# Patient Record
Sex: FEMALE | Race: WHITE | Employment: OTHER | URBAN - METROPOLITAN AREA
[De-identification: names, ages, dates, MRNs, and addresses within clinical notes are randomized per-mention and may not be internally consistent; named-entity substitution may affect disease eponyms.]

---

## 2017-02-01 ENCOUNTER — TELEPHONE (OUTPATIENT)
Dept: INTERNAL MEDICINE | Facility: CLINIC | Age: 77
End: 2017-02-01

## 2017-02-03 DIAGNOSIS — E03.9 HYPOTHYROIDISM, UNSPECIFIED TYPE: ICD-10-CM

## 2017-02-03 DIAGNOSIS — N18.30 CKD (CHRONIC KIDNEY DISEASE) STAGE 3, GFR 30-59 ML/MIN (H): ICD-10-CM

## 2017-02-03 DIAGNOSIS — N18.30 TYPE 2 DIABETES MELLITUS WITH STAGE 3 CHRONIC KIDNEY DISEASE (H): ICD-10-CM

## 2017-02-03 DIAGNOSIS — E11.22 TYPE 2 DIABETES MELLITUS WITH STAGE 3 CHRONIC KIDNEY DISEASE (H): ICD-10-CM

## 2017-02-03 LAB
ANION GAP SERPL CALCULATED.3IONS-SCNC: 7 MMOL/L (ref 3–14)
BUN SERPL-MCNC: 21 MG/DL (ref 7–30)
CALCIUM SERPL-MCNC: 8.9 MG/DL (ref 8.5–10.1)
CHLORIDE SERPL-SCNC: 106 MMOL/L (ref 94–109)
CHOLEST SERPL-MCNC: 158 MG/DL
CO2 SERPL-SCNC: 28 MMOL/L (ref 20–32)
CREAT SERPL-MCNC: 1.32 MG/DL (ref 0.52–1.04)
CREAT UR-MCNC: 161 MG/DL
DEPRECATED CALCIDIOL+CALCIFEROL SERPL-MC: 18 UG/L (ref 20–75)
ERYTHROCYTE [DISTWIDTH] IN BLOOD BY AUTOMATED COUNT: 14.1 % (ref 10–15)
GFR SERPL CREATININE-BSD FRML MDRD: 39 ML/MIN/1.7M2
GLUCOSE SERPL-MCNC: 113 MG/DL (ref 70–99)
HBA1C MFR BLD: 6.1 % (ref 4.3–6)
HCT VFR BLD AUTO: 34.4 % (ref 35–47)
HDLC SERPL-MCNC: 50 MG/DL
HGB BLD-MCNC: 10.9 G/DL (ref 11.7–15.7)
LDLC SERPL CALC-MCNC: 85 MG/DL
MCH RBC QN AUTO: 32.9 PG (ref 26.5–33)
MCHC RBC AUTO-ENTMCNC: 31.7 G/DL (ref 31.5–36.5)
MCV RBC AUTO: 104 FL (ref 78–100)
MICROALBUMIN UR-MCNC: 20 MG/L
MICROALBUMIN/CREAT UR: 12.73 MG/G CR (ref 0–25)
NONHDLC SERPL-MCNC: 108 MG/DL
PLATELET # BLD AUTO: 156 10E9/L (ref 150–450)
POTASSIUM SERPL-SCNC: 4.2 MMOL/L (ref 3.4–5.3)
RBC # BLD AUTO: 3.31 10E12/L (ref 3.8–5.2)
SODIUM SERPL-SCNC: 141 MMOL/L (ref 133–144)
T4 FREE SERPL-MCNC: 1 NG/DL (ref 0.76–1.46)
TRIGL SERPL-MCNC: 117 MG/DL
TSH SERPL DL<=0.005 MIU/L-ACNC: 9.5 MU/L (ref 0.4–4)
WBC # BLD AUTO: 8.6 10E9/L (ref 4–11)

## 2017-02-03 PROCEDURE — 80048 BASIC METABOLIC PNL TOTAL CA: CPT | Performed by: INTERNAL MEDICINE

## 2017-02-03 PROCEDURE — 84443 ASSAY THYROID STIM HORMONE: CPT | Performed by: INTERNAL MEDICINE

## 2017-02-03 PROCEDURE — 36415 COLL VENOUS BLD VENIPUNCTURE: CPT | Performed by: INTERNAL MEDICINE

## 2017-02-03 PROCEDURE — 82043 UR ALBUMIN QUANTITATIVE: CPT | Performed by: INTERNAL MEDICINE

## 2017-02-03 PROCEDURE — 83036 HEMOGLOBIN GLYCOSYLATED A1C: CPT | Performed by: INTERNAL MEDICINE

## 2017-02-03 PROCEDURE — 84439 ASSAY OF FREE THYROXINE: CPT | Performed by: INTERNAL MEDICINE

## 2017-02-03 PROCEDURE — 82306 VITAMIN D 25 HYDROXY: CPT | Performed by: INTERNAL MEDICINE

## 2017-02-03 PROCEDURE — 85027 COMPLETE CBC AUTOMATED: CPT | Performed by: INTERNAL MEDICINE

## 2017-02-03 PROCEDURE — 80061 LIPID PANEL: CPT | Performed by: INTERNAL MEDICINE

## 2017-02-22 ENCOUNTER — OFFICE VISIT (OUTPATIENT)
Dept: INTERNAL MEDICINE | Facility: CLINIC | Age: 77
End: 2017-02-22
Payer: COMMERCIAL

## 2017-02-22 ENCOUNTER — RADIANT APPOINTMENT (OUTPATIENT)
Dept: GENERAL RADIOLOGY | Facility: CLINIC | Age: 77
End: 2017-02-22
Attending: PHYSICIAN ASSISTANT
Payer: COMMERCIAL

## 2017-02-22 VITALS
SYSTOLIC BLOOD PRESSURE: 140 MMHG | HEART RATE: 81 BPM | BODY MASS INDEX: 40.85 KG/M2 | TEMPERATURE: 98.5 F | DIASTOLIC BLOOD PRESSURE: 70 MMHG | WEIGHT: 238 LBS | OXYGEN SATURATION: 96 %

## 2017-02-22 DIAGNOSIS — S89.92XA LEFT KNEE INJURY, INITIAL ENCOUNTER: ICD-10-CM

## 2017-02-22 DIAGNOSIS — N76.0 LABIAL INFECTION: Primary | ICD-10-CM

## 2017-02-22 PROCEDURE — 99214 OFFICE O/P EST MOD 30 MIN: CPT | Performed by: PHYSICIAN ASSISTANT

## 2017-02-22 PROCEDURE — 73562 X-RAY EXAM OF KNEE 3: CPT | Mod: LT

## 2017-02-22 RX ORDER — CEPHALEXIN 500 MG/1
500 CAPSULE ORAL 3 TIMES DAILY
Qty: 30 CAPSULE | Refills: 0 | Status: SHIPPED | OUTPATIENT
Start: 2017-02-22 | End: 2017-03-04

## 2017-02-22 NOTE — NURSING NOTE
"Chief Complaint   Patient presents with     Vaginal Problem     external lump on vagina-painful x cpl months      Musculoskeletal Problem     R knee pain        Initial /70 (BP Location: Left arm, Patient Position: Chair, Cuff Size: Adult Regular)  Pulse 81  Temp 98.5  F (36.9  C) (Oral)  Wt 238 lb (108 kg)  LMP 06/16/1985 (Approximate)  SpO2 96%  BMI 40.85 kg/m2 Estimated body mass index is 40.85 kg/(m^2) as calculated from the following:    Height as of 4/18/16: 5' 4\" (1.626 m).    Weight as of this encounter: 238 lb (108 kg).  Medication Reconciliation: complete    "

## 2017-02-22 NOTE — PROGRESS NOTES
SUBJECTIVE:                                                    Mahogany Blanco is a 76 year old female who presents to clinic today for the following health issues:      Pt states she has had a bump on external vaginal area for a couple months, didn't bother her at first and now it is painful. Pt also fell on knee x4 days and would like it looked at.            Vaginal Symptoms      Duration: 2-3 months.     Description  One bump, felt.  Last night with pain    Intensity:  moderate    Accompanying signs and symptoms (fever/dysuria/abdominal or back pain): no drainage from the area     History  Sexually active: not at present  Possibility of pregnancy: No  Recent antibiotic use: no     Precipitating or alleviating factors: None    Therapies tried and outcome: none   Outcome:    Musculoskeletal problem/pain      Duration: 2/18/2017     Description  Location: left knee     Intensity:  moderate    Accompanying signs and symptoms: swelling and bruising     History  Previous similar problem: no   Previous evaluation:  none    Precipitating or alleviating factors:  Trauma or overuse: YES- fall   Aggravating factors include: standing and walking  Therapies tried and outcome: nothing  Tylenol, icing the day that she fell       Problem list and histories reviewed & adjusted, as indicated.  Additional history: as documented    Labs reviewed in EPIC  Problem list, Medication list, Allergies, and Medical/Social/Surgical histories reviewed in [facility] and updated as appropriate.    ROS:  Constitutional, HEENT, cardiovascular, pulmonary, gi and gu systems are negative, except as otherwise noted.    OBJECTIVE:                                                    /70 (BP Location: Left arm, Patient Position: Chair, Cuff Size: Adult Regular)  Pulse 81  Temp 98.5  F (36.9  C) (Oral)  Wt 238 lb (108 kg)  LMP 06/16/1985 (Approximate)  SpO2 96%  BMI 40.85 kg/m2  Body mass index is 40.85 kg/(m^2).  GENERAL: healthy, alert and no  distress  NECK: no adenopathy, no asymmetry, masses, or scars and thyroid normal to palpation  RESP: lungs clear to auscultation - no rales, rhonchi or wheezes  CV: regular rate and rhythm, normal S1 S2, no S3 or S4, no murmur, click or rub, no peripheral edema and peripheral pulses strong   (female): normal female external genitalia, normal urethral meatus  and vaginal skin findings: right labia with redness swelling slight induration noted.   MS: left knee with large echymosis anterior knee, abrasion just below patella  Tenderness anterior knee.   No calf pain.      Diagnostic Test Results:  Xray- no fx      ASSESSMENT/PLAN:                                                            1. Labial infection    - cephALEXin (KEFLEX) 500 MG capsule; Take 1 capsule (500 mg) by mouth 3 times daily for 10 days  Dispense: 30 capsule; Refill: 0    2. Left knee injury, initial encounter    - XR Knee Left 3 Views; Future    Warm compress/warm shower to the  area  Recheck if not improving with abx xcourse    Sheri Elmore PA-C  [facility]

## 2017-02-22 NOTE — MR AVS SNAPSHOT
"              After Visit Summary   2017    Mahogany Blanco    MRN: [id number]           Patient Information     Date Of Birth          1940        Visit Information        Provider Department      2017 4:20 PM Sheri Elmore PA-C [facility]        Today's Diagnoses     Labial infection    -  1    Left knee injury, initial encounter           Follow-ups after your visit        Who to contact     If you have questions or need follow up information about today's clinic visit or your schedule please contact [facility] directly at [phone].  Normal or non-critical lab and imaging results will be communicated to you by 3seventyhart, letter or phone within 4 business days after the clinic has received the results. If you do not hear from us within 7 days, please contact the clinic through 3seventyhart or phone. If you have a critical or abnormal lab result, we will notify you by phone as soon as possible.  Submit refill requests through "[employer] or call your pharmacy and they will forward the refill request to us. Please allow 3 business days for your refill to be completed.          Additional Information About Your Visit        MyChart Information     "[employer] lets you send messages to your doctor, view your test results, renew your prescriptions, schedule appointments and more. To sign up, go to [email or web address] . Click on \"Log in\" on the left side of the screen, which will take you to the Welcome page. Then click on \"Sign up Now\" on the right side of the page.     You will be asked to enter the access code listed below, as well as some personal information. Please follow the directions to create your username and password.     Your access code is: [email or web address]  Expires: 3/22/2017 11:08 AM     Your access code will  in 90 days. If you need help or a new code, please call your [facility] or [phone].        Care EveryWhere ID     " This is your Care EveryWhere ID. This could be used by other organizations to access your [facility] medical records  [id number]        Your Vitals Were     Pulse Temperature Last Period Pulse Oximetry BMI (Body Mass Index)       81 98.5  F (36.9  C) (Oral) 06/16/1985 (Approximate) 96% 40.85 kg/m2        Blood Pressure from Last 3 Encounters:   02/22/17 140/70   12/22/16 110/70   05/27/16 148/86    Weight from Last 3 Encounters:   02/22/17 238 lb (108 kg)   12/22/16 235 lb (106.6 kg)   05/27/16 243 lb (110.2 kg)                 Today's Medication Changes          These changes are accurate as of: 2/22/17  4:57 PM.  If you have any questions, ask your nurse or doctor.               Start taking these medicines.        Dose/Directions    cephALEXin 500 MG capsule   Commonly known as:  KEFLEX   Used for:  Labial infection   Started by:  Sheri Elmore PA-C        Dose:  500 mg   Take 1 capsule (500 mg) by mouth 3 times daily for 10 days   Quantity:  30 capsule   Refills:  0            Where to get your medicines      These medications were sent to [facility] [address] [place] [address] [address] AT [facility] [address] [place]  [address], [facility] [address]     Phone:  [phone]     cephALEXin 500 MG capsule                Primary Care Provider Office Phone # Fax #    Aquilino Lilly [phone]       [facility] [address]  [facility] [address]        Thank you!     Thank you for choosing [facility]  for your care. Our goal is always to provide you with excellent care. Hearing back from our patients is one way we can continue to improve our services. Please take a few minutes to complete the written survey that you may receive in the mail after your visit with us. Thank you!             Your Updated Medication List - Protect others around you: Learn how to safely use, store and throw away your medicines at  www.disposemymeds.org.          This list is accurate as of: 2/22/17  4:57 PM.  Always use your most recent med list.                   Brand Name Dispense Instructions for use    albuterol 108 (90 BASE) MCG/ACT Inhaler    albuterol    1 Inhaler    Inhale 2 puffs into the lungs every 4 hours as needed for shortness of breath / dyspnea or wheezing       B-12 1000 MCG Tbcr     90 tablet    Take 1,000 mcg by mouth daily       blood glucose monitoring lancets      Use to test blood sugar once daily or as directed.       blood glucose monitoring test strip    ACCU-CHEK NOELLE    50 each    Use to test blood sugars 1 time daily or as directed.       buPROPion 150 MG 24 hr tablet    WELLBUTRIN XL    90 tablet    Take 1 tablet (150 mg) by mouth every morning       cephALEXin 500 MG capsule    KEFLEX    30 capsule    Take 1 capsule (500 mg) by mouth 3 times daily for 10 days       citalopram 20 MG tablet    celeXA    90 tablet    Take 1 tablet (20 mg) by mouth daily       colchicine 0.6 MG tablet    COLCRYS    30 tablet    Take 2 tablets at the first sign of flare, take 1 additional tablet one hour later.       fluticasone 50 MCG/ACT spray    FLONASE    16 g    Spray 1-2 sprays into both nostrils daily       levothyroxine 75 MCG tablet    SYNTHROID/LEVOTHROID    90 tablet    Take 1 tablet (75 mcg) by mouth daily       lisinopril 40 MG tablet    PRINIVIL/ZESTRIL    90 tablet    Take 1 tablet (40 mg) by mouth daily       omeprazole 20 MG CR capsule    priLOSEC    90 capsule    Take 1 capsule (20 mg) by mouth daily       order for DME     1 Device    Disp one glucometer that is covered by insurance with 1 RF. Also disp glucometer strips and lancets to check sugars daily. Disp 100 strips and  Lancets  with prn RFs for 1 year       order for DME     1 each    Equipment being ordered:   AeroChamber (or similar device for inhaler use)       pravastatin 40 MG tablet    PRAVACHOL    90 tablet    Take 1 tablet (40 mg) by mouth daily at  bedtime.       traMADol 50 MG tablet    ULTRAM    20 tablet    Take 1 tablet (50 mg) by mouth every 6 hours as needed for moderate pain       zolpidem 5 MG tablet    AMBIEN    30 tablet    Take 1 tablet (5 mg) by mouth nightly as needed for sleep

## 2017-03-13 DIAGNOSIS — E78.5 HYPERLIPIDEMIA LDL GOAL <100: ICD-10-CM

## 2017-03-13 RX ORDER — PRAVASTATIN SODIUM 40 MG
40 TABLET ORAL DAILY
Qty: 90 TABLET | Refills: 3 | Status: SHIPPED | OUTPATIENT
Start: 2017-03-13 | End: 2018-03-07

## 2017-03-13 NOTE — TELEPHONE ENCOUNTER
pravastatin (PRAVACHOL) 40 MG tablet     Last Written Prescription Date: 9/14/2016  Last Fill Quantity: 90, # refills: 0  Last Office Visit with G, [facility] or [facility] prescribing provider: 2/22/2017       Lab Results   Component Value Date    CHOL 158 02/03/2017     Lab Results   Component Value Date    HDL 50 02/03/2017     Lab Results   Component Value Date    LDL 85 02/03/2017     Lab Results   Component Value Date    TRIG 117 02/03/2017     Lab Results   Component Value Date    CHOLHDLRATIO 3.0 08/31/2015

## 2017-05-10 DIAGNOSIS — E03.9 HYPOTHYROIDISM, UNSPECIFIED TYPE: Primary | ICD-10-CM

## 2017-05-10 DIAGNOSIS — E03.9 HYPOTHYROIDISM, UNSPECIFIED TYPE: ICD-10-CM

## 2017-05-10 RX ORDER — LEVOTHYROXINE SODIUM 75 UG/1
TABLET ORAL
Qty: 30 TABLET | Refills: 0 | Status: SHIPPED | OUTPATIENT
Start: 2017-05-10 | End: 2017-05-10

## 2017-05-10 RX ORDER — LEVOTHYROXINE SODIUM 75 UG/1
TABLET ORAL
Qty: 90 TABLET | Refills: 0 | OUTPATIENT
Start: 2017-05-10

## 2017-05-10 RX ORDER — LEVOTHYROXINE SODIUM 75 UG/1
TABLET ORAL
Qty: 90 TABLET | Refills: 0 | Status: SHIPPED | OUTPATIENT
Start: 2017-05-10 | End: 2017-05-26 | Stop reason: DRUGHIGH

## 2017-05-10 NOTE — TELEPHONE ENCOUNTER
Routing refill request to provider for review/approval because:  Labs out of range:  TSH        Synthroid      Last Written Prescription Date: 5/4/16  Last Quantity: 90, # refills: 3  Last Office Visit with G, P or [facility] prescribing provider: 2/22/17        TSH   Date Value Ref Range Status   02/03/2017 9.50 (H) 0.40 - 4.00 mU/L Final

## 2017-05-10 NOTE — TELEPHONE ENCOUNTER
Pt was sent letter in February instructing her to  See me  Within a few weeks after letter sent to review abnormal labs to determine if low thyroid due to med noncompliance vs need for dose increase, etc. Pt then scheduled appt for late April and then cancelled it per chart. RF done for 30 days. Needs to be seen back in clinic in the next 30 days to discuss this and other lab abnormalities as per last lab letter since to pt in February. Call pt to assist with getting appt made for pt and informing her of above.  Will address additional refills of meds beyond 30 days after pt seen back in clinic

## 2017-05-10 NOTE — TELEPHONE ENCOUNTER
Duplicate  Filled today   Pharmacy advised not to fill #90 see previous refill encounter.  Only #30 approved, pt overdue for follow up appointment.

## 2017-05-12 DIAGNOSIS — I10 ESSENTIAL HYPERTENSION, BENIGN: ICD-10-CM

## 2017-05-12 RX ORDER — LISINOPRIL 40 MG/1
TABLET ORAL
Qty: 90 TABLET | Refills: 0 | Status: SHIPPED | OUTPATIENT
Start: 2017-05-12 | End: 2017-08-08

## 2017-05-12 NOTE — TELEPHONE ENCOUNTER
Prescription approved per [facility] Refill Protocol.  Upcoming appt

## 2017-05-12 NOTE — TELEPHONE ENCOUNTER
lisinopril (PRINIVIL,ZESTRIL) 40 MG tablet      Last Written Prescription Date: 5/17/2016  Last Fill Quantity: 90, # refills: 3  Last Office Visit with G, P or [facility] prescribing provider: 2/22/2017  Next 5 appointments (look out 90 days)     May 26, 2017 10:00 AM CDT   Office Visit with Aquilino Lilly MD   [facility] ([facility]    [address] [place] [address] [phone]                   Potassium   Date Value Ref Range Status   02/03/2017 4.2 3.4 - 5.3 mmol/L Final     Creatinine   Date Value Ref Range Status   02/03/2017 1.32 (H) 0.52 - 1.04 mg/dL Final     BP Readings from Last 3 Encounters:   02/22/17 140/70   12/22/16 110/70   05/27/16 148/86

## 2017-05-26 ENCOUNTER — OFFICE VISIT (OUTPATIENT)
Dept: INTERNAL MEDICINE | Facility: CLINIC | Age: 77
End: 2017-05-26
Payer: COMMERCIAL

## 2017-05-26 VITALS
RESPIRATION RATE: 18 BRPM | BODY MASS INDEX: 40.37 KG/M2 | OXYGEN SATURATION: 95 % | TEMPERATURE: 98.5 F | HEART RATE: 82 BPM | DIASTOLIC BLOOD PRESSURE: 70 MMHG | HEIGHT: 65 IN | SYSTOLIC BLOOD PRESSURE: 120 MMHG | WEIGHT: 242.3 LBS

## 2017-05-26 DIAGNOSIS — E11.22 TYPE 2 DIABETES MELLITUS WITH STAGE 3 CHRONIC KIDNEY DISEASE, WITHOUT LONG-TERM CURRENT USE OF INSULIN (H): ICD-10-CM

## 2017-05-26 DIAGNOSIS — E03.9 HYPOTHYROIDISM, UNSPECIFIED TYPE: Primary | ICD-10-CM

## 2017-05-26 DIAGNOSIS — F32.1 MAJOR DEPRESSIVE DISORDER, SINGLE EPISODE, MODERATE (H): ICD-10-CM

## 2017-05-26 DIAGNOSIS — N18.30 CKD (CHRONIC KIDNEY DISEASE) STAGE 3, GFR 30-59 ML/MIN (H): ICD-10-CM

## 2017-05-26 DIAGNOSIS — E66.01 MORBID OBESITY, UNSPECIFIED OBESITY TYPE (H): ICD-10-CM

## 2017-05-26 DIAGNOSIS — N18.30 TYPE 2 DIABETES MELLITUS WITH STAGE 3 CHRONIC KIDNEY DISEASE, WITHOUT LONG-TERM CURRENT USE OF INSULIN (H): ICD-10-CM

## 2017-05-26 PROCEDURE — 99207 C FOOT EXAM  NO CHARGE: CPT | Performed by: INTERNAL MEDICINE

## 2017-05-26 PROCEDURE — 99214 OFFICE O/P EST MOD 30 MIN: CPT | Performed by: INTERNAL MEDICINE

## 2017-05-26 RX ORDER — LEVOTHYROXINE SODIUM 88 UG/1
88 TABLET ORAL DAILY
Qty: 90 TABLET | Refills: 3 | Status: SHIPPED | OUTPATIENT
Start: 2017-05-26 | End: 2018-08-24

## 2017-05-26 NOTE — NURSING NOTE
"Chief Complaint   Patient presents with     Hypertension     Lipids     Depression     Diabetes       Initial /70  Pulse 82  Temp 98.5  F (36.9  C) (Oral)  Resp 18  Ht 5' 4.6\" (1.641 m)  Wt 242 lb 4.8 oz (109.9 kg)  LMP 06/16/1985 (Approximate)  SpO2 95%  BMI 40.82 kg/m2 Estimated body mass index is 40.82 kg/(m^2) as calculated from the following:    Height as of this encounter: 5' 4.6\" (1.641 m).    Weight as of this encounter: 242 lb 4.8 oz (109.9 kg).  Blood pressure completed using cuff size: Large    "

## 2017-05-26 NOTE — MR AVS SNAPSHOT
After Visit Summary   5/26/2017    Mahogany Blanco    MRN: [id number]           Patient Information     Date Of Birth          1940        Visit Information        Provider Department      5/26/2017 10:00 AM Aquilino Lilly MD [facility]        Today's Diagnoses     Hypothyroidism, unspecified type    -  1      Care Instructions    Stop Levothyroxine 75mcg tab,  Start Levothyroxine 88mcg tab, 1 tab daily for thyroid function  Nonfasting labs in mid July  Moisturizer to feet every day  Reduce carbohydrate (sugars, starchy foods such as bread, rice, potato, pasta, etc) intake  in your diet and increase the amount of color on your plate with fruits, vegetables and lean meats.   Consider [place] or other options for socialization  Continue other medications  Avoid anti-inflammatory medications (Ibuprofen/ Aleve). OK for Tylenol  If knees worsen, then make appt with [facility] for possible injection. Sites include [facility] [phone] or [facility] [phone]. Call for appointment.           Follow-ups after your visit        Future tests that were ordered for you today     Open Future Orders        Priority Expected Expires Ordered    TSH with free T4 reflex Routine 7/7/2017 5/26/2018 5/26/2017            Who to contact     If you have questions or need follow up information about today's clinic visit or your schedule please contact [facility] directly at [phone].  Normal or non-critical lab and imaging results will be communicated to you by MyChart, letter or phone within 4 business days after the clinic has received the results. If you do not hear from us within 7 days, please contact the clinic through MyChart or phone. If you have a critical or abnormal lab result, we will notify you by phone as soon as possible.  Submit refill requests through [employer] or call your pharmacy and they will forward the refill request to us. Please  "allow 3 business days for your refill to be completed.          Additional Information About Your Visit        MyChart Information     [employer] lets you send messages to your doctor, view your test results, renew your prescriptions, schedule appointments and more. To sign up, go to [email or web address] . Click on \"Log in\" on the left side of the screen, which will take you to the Welcome page. Then click on \"Sign up Now\" on the right side of the page.     You will be asked to enter the access code listed below, as well as some personal information. Please follow the directions to create your username and password.     Your access code is: [email or web address]  Expires: 2017 10:40 AM     Your access code will  in 90 days. If you need help or a new code, please call your [place] clinic or [phone].        Care EveryWhere ID     This is your Care EveryWhere ID. This could be used by other organizations to access your [place] medical records  [id number]        Your Vitals Were     Pulse Temperature Respirations Height Last Period Pulse Oximetry    82 98.5  F (36.9  C) (Oral) 18 5' 4.6\" (1.641 m) 1985 (Approximate) 95%    BMI (Body Mass Index)                   40.82 kg/m2            Blood Pressure from Last 3 Encounters:   17 120/70   17 140/70   16 110/70    Weight from Last 3 Encounters:   17 242 lb 4.8 oz (109.9 kg)   17 238 lb (108 kg)   16 235 lb (106.6 kg)                 Today's Medication Changes          These changes are accurate as of: 17 10:40 AM.  If you have any questions, ask your nurse or doctor.               These medicines have changed or have updated prescriptions.        Dose/Directions    levothyroxine 88 MCG tablet   Commonly known as:  SYNTHROID/LEVOTHROID   This may have changed:  See the new instructions.   Used for:  Hypothyroidism, unspecified type   Changed by:  Aquilino Lilly MD        Dose:  88 mcg   Take 1 tablet (88 mcg) by mouth " daily   Quantity:  90 tablet   Refills:  3            Where to get your medicines      These medications were sent to [id number] - [place], [place] - [address] AT [place] [address] [place] [address]  [address], [facility] [address]     Phone:  [phone]     levothyroxine 88 MCG tablet                Primary Care Provider Office Phone # Fax #    Aquilino Lilly [phone]       [facility] [address]  [facility] [address]        Thank you!     Thank you for choosing [facility]  for your care. Our goal is always to provide you with excellent care. Hearing back from our patients is one way we can continue to improve our services. Please take a few minutes to complete the written survey that you may receive in the mail after your visit with us. Thank you!             Your Updated Medication List - Protect others around you: Learn how to safely use, store and throw away your medicines at www.disposemymeds.org.          This list is accurate as of: 5/26/17 10:40 AM.  Always use your most recent med list.                   Brand Name Dispense Instructions for use    albuterol 108 (90 BASE) MCG/ACT Inhaler    albuterol    1 Inhaler    Inhale 2 puffs into the lungs every 4 hours as needed for shortness of breath / dyspnea or wheezing       B-12 1000 MCG Tbcr     90 tablet    Take 1,000 mcg by mouth daily       blood glucose monitoring lancets      Use to test blood sugar once daily or as directed.       blood glucose monitoring test strip    ACCU-CHEK NOELLE    50 each    Use to test blood sugars 1 time daily or as directed.       buPROPion 150 MG 24 hr tablet    WELLBUTRIN XL    90 tablet    Take 1 tablet (150 mg) by mouth every morning       citalopram 20 MG tablet    celeXA    90 tablet    Take 1 tablet (20 mg) by mouth daily       colchicine 0.6 MG tablet    COLCRYS    30 tablet    Take 2 tablets at the first sign of flare,  take 1 additional tablet one hour later.       fluticasone 50 MCG/ACT spray    FLONASE    16 g    Spray 1-2 sprays into both nostrils daily       levothyroxine 88 MCG tablet    SYNTHROID/LEVOTHROID    90 tablet    Take 1 tablet (88 mcg) by mouth daily       lisinopril 40 MG tablet    PRINIVIL/ZESTRIL    90 tablet    TAKE 1 TABLET(40 MG) BY MOUTH DAILY       omeprazole 20 MG CR capsule    priLOSEC    90 capsule    Take 1 capsule (20 mg) by mouth daily       order for DME     1 Device    Disp one glucometer that is covered by insurance with 1 RF. Also disp glucometer strips and lancets to check sugars daily. Disp 100 strips and  Lancets  with prn RFs for 1 year       order for DME     1 each    Equipment being ordered:   AeroChamber (or similar device for inhaler use)       pravastatin 40 MG tablet    PRAVACHOL    90 tablet    Take 1 tablet (40 mg) by mouth daily at bedtime.       traMADol 50 MG tablet    ULTRAM    20 tablet    Take 1 tablet (50 mg) by mouth every 6 hours as needed for moderate pain       zolpidem 5 MG tablet    AMBIEN    30 tablet    Take 1 tablet (5 mg) by mouth nightly as needed for sleep

## 2017-05-26 NOTE — PROGRESS NOTES
SUBJECTIVE:                                                    Mahogany Blanco is a 77 year old female who presents to clinic today for the following health issues:      Diabetes Follow-up    Patient is checking blood sugars: once daily.  Results are as follows:         am - 114-125    Diabetic concerns: None     Symptoms of hypoglycemia (low blood sugar): none     Paresthesias (numbness or burning in feet) or sores: No     Date of last diabetic eye exam: 1 yr     Hypertension Follow-up      Outpatient blood pressures are Not being checked at  home    Low Salt Diet: low salt     Depression Followup    Status since last visit: Stable     See PHQ-9 for current symptoms.  Other associated symptoms: None    Complicating factors:   Significant life event:  No   Current substance abuse:  None  Anxiety or Panic symptoms:  No    PHQ-9  English PHQ-9   Any Language            Amount of exercise or physical activity: None    Problems taking medications regularly: No    Medication side effects: none    Diet: regular (no restrictions) and low salt    Pt's past medical history, family history, habits, medications and allergies were reviewed with the patient today.  See snap shot for  HCM status. Most recent lab results reviewed with pt. Problem list and histories reviewed & adjusted, as indicated.  Additional history as below:    Cards Friday  Uatsdin Sundays  Otherwise not getting out muc to socialize. Talks some with children. Daughter here today. Pt lives in house and refusing to move     Denies CP,   abdominal pain, polyuria, polydipsia, vision changes, extremity numbness/parasthesias. Mild chronic SOB not limiting activity. No cough   Lives by herself and gets lonely at night. HAs cats,. Mood good with meds when around others.  Eating donuts often. Weight up  Hx bilateral knee pain with DJD. HAs been told needs future TKA but not wanting to do. No recent cortisone injection trials    Lab Results   Component Value Date     " 02/03/2017     Lab Results   Component Value Date    A1C 6.1 02/03/2017     Lab Results   Component Value Date    CHOL 158 02/03/2017     Lab Results   Component Value Date    LDL 85 02/03/2017     Lab Results   Component Value Date    HDL 50 02/03/2017     Lab Results   Component Value Date    TRIG 117 02/03/2017     Lab Results   Component Value Date    CR 1.32 02/03/2017     Lab Results   Component Value Date    ALT 23 04/18/2016     Lab Results   Component Value Date    AST 12 04/18/2016     Lab Results   Component Value Date    MICROL 20 02/03/2017     Lab Results   Component Value Date    TSH 9.50 02/03/2017            Additional ROS:   Constitutional, HEENT, Cardiovascular, Pulmonary, GI and , Neuro, MSK and Psych review of systems/symptoms are otherwise negative or unchanged from previous, except as noted above.      OBJECTIVE:  /70  Pulse 82  Temp 98.5  F (36.9  C) (Oral)  Resp 18  Ht 5' 4.6\" (1.641 m)  Wt 242 lb 4.8 oz (109.9 kg)  LMP 06/16/1985 (Approximate)  SpO2 95%  BMI 40.82 kg/m2   Estimated body mass index is 40.82 kg/(m^2) as calculated from the following:    Height as of this encounter: 5' 4.6\" (1.641 m).    Weight as of this encounter: 242 lb 4.8 oz (109.9 kg).  Eye: PERRL, EOMI  HENT: ear canals and TM's normal and nose and mouth without ulcers or lesions   Neck: no adenopathy. Thyroid normal to palpation. No bruits  Pulm: Lungs clear to auscultation   CV: Regular rates and rhythm  GI: Soft, obese, nontender, Normal active bowel sounds, No hepatosplenomegaly or masses palpable  Ext: Peripheral pulses intact. Trace BLE edema. Skin of feet dry. No ulcerations  Neuro: Normal strength and tone, sensory exam grossly normal    Assessment/Plan: (See plan discussion below for further details)  1. Hypothyroidism, unspecified type   Thyroid function too low. See plan discussion below  - levothyroxine (SYNTHROID/LEVOTHROID) 88 MCG tablet; Take 1 tablet (88 mcg) by mouth daily  " Dispense: 90 tablet; Refill: 3  - TSH with free T4 reflex; Future    2. Type 2 diabetes mellitus with stage 3 chronic kidney disease, without long-term current use of insulin (H)  Controlled per previous labs. Repeat A1C 3 mos. Reduce carbohydrate (sugars, starchy foods such as bread, rice, potato, pasta, etc) intake  in your diet and increase the amount of color on your plate with fruits, vegetables and lean meats.   - FOOT EXAM  - Hemoglobin A1c; Future    3. Morbid obesity, unspecified obesity type (H)  See plan discussion below    4. Major depressive disorder, single episode, moderate (H)  PHQ=7. Mostly related to isolation/boredone and weight. Mood good when with others.  Continue meds for now and increase socialization. If not improving in future, pt to let me know and will increase Buproprion dosage    5. CKD (chronic kidney disease) stage 3, GFR 30-59 ml/min  Repeat BMP 3 mos      Plan discussion:  Stop Levothyroxine 75mcg tab,  Start Levothyroxine 88mcg tab, 1 tab daily for thyroid function  Nonfasting labs in mid July for thyroid  Other labs nonfasting in early September  Moisturizer to feet every day  Reduce carbohydrate (sugars, starchy foods such as bread, rice, potato, pasta, etc) intake  in your diet and increase the amount of color on your plate with fruits, vegetables and lean meats.   Consider [place] or other options for socialization  Continue other medications  Avoid anti-inflammatory medications (Ibuprofen/ Aleve). OK for Tylenol  If knees worsen, then make appt with [facility] for possible injection. Sites include [facility] [phone] or [place] [phone]. Call for appointment.                 Aquilino Lilly MD  Internal Medicine Department  [facility]

## 2017-05-26 NOTE — PATIENT INSTRUCTIONS
Stop Levothyroxine 75mcg tab,  Start Levothyroxine 88mcg tab, 1 tab daily for thyroid function  Nonfasting labs in mid July for thyroid  Other labs nonfasting in early September  Moisturizer to feet every day  Reduce carbohydrate (sugars, starchy foods such as bread, rice, potato, pasta, etc) intake  in your diet and increase the amount of color on your plate with fruits, vegetables and lean meats.   Consider [facility] or other options for socialization  Continue other medications  Avoid anti-inflammatory medications (Ibuprofen/ Aleve). OK for Tylenol  If knees worsen, then make appt with [facility] for possible injection. Sites include [facility] [phone] or [place] [phone]. Call for appointment.

## 2017-05-27 ASSESSMENT — PATIENT HEALTH QUESTIONNAIRE - PHQ9: SUM OF ALL RESPONSES TO PHQ QUESTIONS 1-9: 7

## 2017-05-31 DIAGNOSIS — F32.1 MAJOR DEPRESSIVE DISORDER, SINGLE EPISODE, MODERATE (H): ICD-10-CM

## 2017-05-31 RX ORDER — BUPROPION HYDROCHLORIDE 150 MG/1
150 TABLET ORAL EVERY MORNING
Qty: 90 TABLET | Refills: 1 | Status: SHIPPED | OUTPATIENT
Start: 2017-05-31 | End: 2017-11-25

## 2017-05-31 NOTE — TELEPHONE ENCOUNTER
buPROPion (WELLBUTRIN XL) 150 MG 24 hr tablet       Last Written Prescription Date: 6/02/2016  Last Fill Quantity: 90; # refills: 3  Last Office Visit with [facility], [facility] or [facility] prescribing provider:  5/26/2017        Last PHQ-9 score on record=   PHQ-9 SCORE 5/26/2017   Total Score -   Total Score 7       Lab Results   Component Value Date    AST 12 04/18/2016     Lab Results   Component Value Date    ALT 23 04/18/2016

## 2017-05-31 NOTE — TELEPHONE ENCOUNTER
Prescription approved per [facility] Refill Protocol.

## 2017-06-08 RX ORDER — LEVOTHYROXINE SODIUM 75 UG/1
TABLET ORAL
Qty: 30 TABLET | Refills: 0 | OUTPATIENT
Start: 2017-06-08

## 2017-07-09 DIAGNOSIS — K21.9 GASTROESOPHAGEAL REFLUX DISEASE, ESOPHAGITIS PRESENCE NOT SPECIFIED: ICD-10-CM

## 2017-07-09 NOTE — TELEPHONE ENCOUNTER
omeprazole (PRILOSEC) 20 MG capsule      Last Written Prescription Date: 05/26/17  Last Fill Quantity: 90 cap,  # refills: 3   Last Office Visit with [facility], [facility] or [facility] prescribing provider: 05/26/17

## 2017-07-14 DIAGNOSIS — R06.02 SOB (SHORTNESS OF BREATH): ICD-10-CM

## 2017-07-14 NOTE — TELEPHONE ENCOUNTER
albuterol (ALBUTEROL) 108 (90 BASE) MCG/ACT inhaler       Last Written Prescription Date: 5/27/2016  Last Fill Quantity: 1 Inhaler, # refills: 11    Last Office Visit with [facility], [facility] or [facility] prescribing provider:  5/26/2017   Future Office Visit:       Date of Last Asthma Action Plan Letter:   There are no preventive care reminders to display for this patient.   Asthma Control Test: No flowsheet data found.    Date of Last Spirometry Test:   No results found for this or any previous visit.

## 2017-07-18 NOTE — TELEPHONE ENCOUNTER
Routing refill request to provider for review/approval because:  Drug not on the [facility] refill protocol for this dx

## 2017-07-19 RX ORDER — ALBUTEROL SULFATE 90 UG/1
AEROSOL, METERED RESPIRATORY (INHALATION)
Qty: 18 G | Refills: 11 | Status: SHIPPED | OUTPATIENT
Start: 2017-07-19 | End: 2018-08-24

## 2017-08-08 DIAGNOSIS — I10 ESSENTIAL HYPERTENSION, BENIGN: ICD-10-CM

## 2017-08-08 RX ORDER — LISINOPRIL 40 MG/1
TABLET ORAL
Qty: 90 TABLET | Refills: 1 | Status: SHIPPED | OUTPATIENT
Start: 2017-08-08 | End: 2018-02-08

## 2017-08-08 NOTE — TELEPHONE ENCOUNTER
Lisinopril 40 mg   Last Written Prescription Date: 5/12/17  Last Fill Quantity: 90, # refills: 0  Last Office Visit with [facility], [facility] or [facility] prescribing provider: 5/26/17       Potassium   Date Value Ref Range Status   02/03/2017 4.2 3.4 - 5.3 mmol/L Final     Creatinine   Date Value Ref Range Status   02/03/2017 1.32 (H) 0.52 - 1.04 mg/dL Final     BP Readings from Last 3 Encounters:   05/26/17 120/70   02/22/17 140/70   12/22/16 110/70

## 2017-08-10 DIAGNOSIS — F32.1 MAJOR DEPRESSIVE DISORDER, SINGLE EPISODE, MODERATE (H): ICD-10-CM

## 2017-08-10 RX ORDER — CITALOPRAM HYDROBROMIDE 20 MG/1
20 TABLET ORAL DAILY
Qty: 90 TABLET | Refills: 0 | Status: SHIPPED | OUTPATIENT
Start: 2017-08-10 | End: 2017-11-09

## 2017-08-10 NOTE — TELEPHONE ENCOUNTER
citalopram (CELEXA) 20 MG tablet     Last Written Prescription Date: 6/02/2016  Last Fill Quantity: 90, # refills: 3  Last Office Visit with [facility] primary care provider:  5/26/2017        Last PHQ-9 score on record=   PHQ-9 SCORE 5/26/2017   Total Score -   Total Score 7

## 2017-08-29 ENCOUNTER — RADIANT APPOINTMENT (OUTPATIENT)
Dept: GENERAL RADIOLOGY | Facility: CLINIC | Age: 77
End: 2017-08-29
Attending: INTERNAL MEDICINE
Payer: COMMERCIAL

## 2017-08-29 ENCOUNTER — HOSPITAL ENCOUNTER (OUTPATIENT)
Dept: CT IMAGING | Facility: CLINIC | Age: 77
Discharge: HOME | End: 2017-08-29
Attending: INTERNAL MEDICINE | Admitting: INTERNAL MEDICINE
Payer: COMMERCIAL

## 2017-08-29 ENCOUNTER — OFFICE VISIT (OUTPATIENT)
Dept: INTERNAL MEDICINE | Facility: CLINIC | Age: 77
End: 2017-08-29
Payer: COMMERCIAL

## 2017-08-29 VITALS
BODY MASS INDEX: 40.6 KG/M2 | DIASTOLIC BLOOD PRESSURE: 74 MMHG | OXYGEN SATURATION: 96 % | HEART RATE: 79 BPM | WEIGHT: 241 LBS | SYSTOLIC BLOOD PRESSURE: 122 MMHG | TEMPERATURE: 98.5 F

## 2017-08-29 DIAGNOSIS — N18.30 CKD (CHRONIC KIDNEY DISEASE) STAGE 3, GFR 30-59 ML/MIN (H): ICD-10-CM

## 2017-08-29 DIAGNOSIS — R06.02 SOB (SHORTNESS OF BREATH): ICD-10-CM

## 2017-08-29 DIAGNOSIS — R06.02 SOB (SHORTNESS OF BREATH): Primary | ICD-10-CM

## 2017-08-29 DIAGNOSIS — N18.30 TYPE 2 DIABETES MELLITUS WITH STAGE 3 CHRONIC KIDNEY DISEASE, WITHOUT LONG-TERM CURRENT USE OF INSULIN (H): ICD-10-CM

## 2017-08-29 DIAGNOSIS — D64.9 ANEMIA, UNSPECIFIED TYPE: ICD-10-CM

## 2017-08-29 DIAGNOSIS — R07.9 CHEST PAIN, UNSPECIFIED TYPE: ICD-10-CM

## 2017-08-29 DIAGNOSIS — E11.22 TYPE 2 DIABETES MELLITUS WITH STAGE 3 CHRONIC KIDNEY DISEASE, WITHOUT LONG-TERM CURRENT USE OF INSULIN (H): ICD-10-CM

## 2017-08-29 DIAGNOSIS — E03.9 HYPOTHYROIDISM, UNSPECIFIED TYPE: ICD-10-CM

## 2017-08-29 DIAGNOSIS — H92.03 OTALGIA OF BOTH EARS: ICD-10-CM

## 2017-08-29 LAB
ANION GAP SERPL CALCULATED.3IONS-SCNC: 6 MMOL/L (ref 3–14)
BUN SERPL-MCNC: 20 MG/DL (ref 7–30)
CALCIUM SERPL-MCNC: 9 MG/DL (ref 8.5–10.1)
CHLORIDE SERPL-SCNC: 106 MMOL/L (ref 94–109)
CO2 SERPL-SCNC: 28 MMOL/L (ref 20–32)
CREAT SERPL-MCNC: 1.25 MG/DL (ref 0.52–1.04)
D DIMER PPP FEU-MCNC: 0.8 UG/ML FEU (ref 0–0.5)
ERYTHROCYTE [DISTWIDTH] IN BLOOD BY AUTOMATED COUNT: 13.4 % (ref 10–15)
ERYTHROCYTE [SEDIMENTATION RATE] IN BLOOD BY WESTERGREN METHOD: 53 MM/H (ref 0–30)
GFR SERPL CREATININE-BSD FRML MDRD: 42 ML/MIN/1.7M2
GLUCOSE SERPL-MCNC: 121 MG/DL (ref 70–99)
HBA1C MFR BLD: 6.3 % (ref 4.3–6)
HCT VFR BLD AUTO: 34.7 % (ref 35–47)
HGB BLD-MCNC: 10.9 G/DL (ref 11.7–15.7)
MCH RBC QN AUTO: 33.2 PG (ref 26.5–33)
MCHC RBC AUTO-ENTMCNC: 31.4 G/DL (ref 31.5–36.5)
MCV RBC AUTO: 106 FL (ref 78–100)
PLATELET # BLD AUTO: 150 10E9/L (ref 150–450)
POTASSIUM SERPL-SCNC: 4.5 MMOL/L (ref 3.4–5.3)
RBC # BLD AUTO: 3.28 10E12/L (ref 3.8–5.2)
SODIUM SERPL-SCNC: 140 MMOL/L (ref 133–144)
TSH SERPL DL<=0.005 MIU/L-ACNC: 3.61 MU/L (ref 0.4–4)
WBC # BLD AUTO: 9.1 10E9/L (ref 4–11)

## 2017-08-29 PROCEDURE — 25000125 ZZHC RX 250: Performed by: INTERNAL MEDICINE

## 2017-08-29 PROCEDURE — 85027 COMPLETE CBC AUTOMATED: CPT | Performed by: INTERNAL MEDICINE

## 2017-08-29 PROCEDURE — 71020 XR CHEST 2 VW: CPT

## 2017-08-29 PROCEDURE — 00000402 ZZHCL STATISTIC TOTAL PROTEIN: Performed by: INTERNAL MEDICINE

## 2017-08-29 PROCEDURE — 84165 PROTEIN E-PHORESIS SERUM: CPT | Performed by: INTERNAL MEDICINE

## 2017-08-29 PROCEDURE — 36415 COLL VENOUS BLD VENIPUNCTURE: CPT | Performed by: INTERNAL MEDICINE

## 2017-08-29 PROCEDURE — 85045 AUTOMATED RETICULOCYTE COUNT: CPT | Performed by: INTERNAL MEDICINE

## 2017-08-29 PROCEDURE — 80048 BASIC METABOLIC PNL TOTAL CA: CPT | Performed by: INTERNAL MEDICINE

## 2017-08-29 PROCEDURE — 25000128 H RX IP 250 OP 636: Performed by: INTERNAL MEDICINE

## 2017-08-29 PROCEDURE — 85379 FIBRIN DEGRADATION QUANT: CPT | Performed by: INTERNAL MEDICINE

## 2017-08-29 PROCEDURE — 71260 CT THORAX DX C+: CPT

## 2017-08-29 PROCEDURE — 84443 ASSAY THYROID STIM HORMONE: CPT | Performed by: INTERNAL MEDICINE

## 2017-08-29 PROCEDURE — 85652 RBC SED RATE AUTOMATED: CPT | Performed by: INTERNAL MEDICINE

## 2017-08-29 PROCEDURE — 93000 ELECTROCARDIOGRAM COMPLETE: CPT | Performed by: INTERNAL MEDICINE

## 2017-08-29 PROCEDURE — 99215 OFFICE O/P EST HI 40 MIN: CPT | Performed by: INTERNAL MEDICINE

## 2017-08-29 PROCEDURE — 86038 ANTINUCLEAR ANTIBODIES: CPT | Performed by: INTERNAL MEDICINE

## 2017-08-29 PROCEDURE — 83036 HEMOGLOBIN GLYCOSYLATED A1C: CPT | Performed by: INTERNAL MEDICINE

## 2017-08-29 RX ORDER — IOPAMIDOL 755 MG/ML
79 INJECTION, SOLUTION INTRAVASCULAR ONCE
Status: COMPLETED | OUTPATIENT
Start: 2017-08-29 | End: 2017-08-29

## 2017-08-29 RX ADMIN — IOPAMIDOL 79 ML: 755 INJECTION, SOLUTION INTRAVENOUS at 15:08

## 2017-08-29 RX ADMIN — SODIUM CHLORIDE 101 ML: 9 INJECTION, SOLUTION INTRAVENOUS at 15:07

## 2017-08-29 NOTE — MR AVS SNAPSHOT
After Visit Summary   8/29/2017    Mahogany Blanco    MRN: [id number]           Patient Information     Date Of Birth          1940        Visit Information        Provider Department      8/29/2017 7:30 AM Aquilino Lilly MD [facility]        Today's Diagnoses     SOB (shortness of breath)    -  1    Hypothyroidism, unspecified type        Type 2 diabetes mellitus with stage 3 chronic kidney disease, without long-term current use of insulin (H)        CKD (chronic kidney disease) stage 3, GFR 30-59 ml/min        Anemia, unspecified type        Chest pain, unspecified type        Otalgia of both ears           Follow-ups after your visit        Your next 10 appointments already scheduled     Sep 01, 2017  9:00 AM CDT   NM INJECTION with SHNMINJ   [facility] ([facility])    [address] [address]   [phone]            Sep 01, 2017  9:45 AM CDT   NM SCAN3 with SHNM2   [facility] ([facility])    [address] [address]   [phone]            Sep 01, 2017 10:15 AM CDT   Ekg Stress Nm Exercise with SHCR2   [facility] Electrocardiology ([facility])    [address] [address]   [phone]            Sep 01, 2017 11:15 AM CDT   NM MPI TREADMILL with SHNM2   [facility] ([facility])    [address] [address]   [phone]           For a ONE day exam: Allow 3-4 hours for test. For a TWO day exam: Allow 2 hours PER day for test.  You may need to stop some medicines before the test. Follow your doctor s orders. - If you take a beta blocker: Follow your doctor s specific instructions on taking it prior to and on the day of your exam. - If you take Aggrenox or dipyridamole (Persantine, Permole), stop taking it 48 hours before your test. - If  you take Viagra, Cialis or Levitra, stop taking it 48 hours before your test. - If you take theophylline or aminophylline, stop taking it 12 hours before your test.  For patients with diabetes: - If you take insulin, call your diabetes care team. Ask if you should take a 1/2 dose the morning of your test. - If you take diabetes medicine by mouth, don t take it on the morning of your test. Bring it with you to take after the test. (If you have questions, call your diabetes care team.)  Do not take nitrates on the day of your test. Do not wear your Nitro-Patch.  Stop all caffeine 12 hours before the test. This includes coffee, tea, soda pop, chocolate and certain medicines (such as Anacin, Excedrin and NoDoz). Also avoid decaf coffee and tea, as these contain small amounts of caffeine.  No alcohol, smoking or other tobacco for 12 hours before the test.  Stop eating 3 hours before the test. You may drink water.  Please wear a loose two-piece outfit. If you will have an exercise test, bring rubber-soled walking shoes.  When you arrive, please tell us if you: - Have diabetes - Are breastfeeding - May be pregnant - Have a pacemaker of ICD (implantable defibrillator).  Please call your Imaging Department at your exam site with any questions.              Future tests that were ordered for you today     Open Future Orders        Priority Expected Expires Ordered    NM Exercise stress test Routine  8/30/2018 8/30/2017    CT Chest Pulmonary Embolism w Contrast Routine  8/29/2018 8/29/2017            Who to contact     If you have questions or need follow up information about today's clinic visit or your schedule please contact [facility] directly at [phone].  Normal or non-critical lab and imaging results will be communicated to you by MyChart, letter or phone within 4 business days after the clinic has received the results. If you do not hear from us within 7 days, please contact the clinic  "through [employer] or phone. If you have a critical or abnormal lab result, we will notify you by phone as soon as possible.  Submit refill requests through [employer] or call your pharmacy and they will forward the refill request to us. Please allow 3 business days for your refill to be completed.          Additional Information About Your Visit        [employer] Information     [employer] lets you send messages to your doctor, view your test results, renew your prescriptions, schedule appointments and more. To sign up, go to [email or web address] . Click on \"Log in\" on the left side of the screen, which will take you to the Welcome page. Then click on \"Sign up Now\" on the right side of the page.     You will be asked to enter the access code listed below, as well as some personal information. Please follow the directions to create your username and password.     Your access code is: [email or web address]  Expires: 2017  3:25 PM     Your access code will  in 90 days. If you need help or a new code, please call your [facility] clinic or [phone].        Care EveryWhere ID     This is your Care EveryWhere ID. This could be used by other organizations to access your [facility] medical records  [id number]        Your Vitals Were     Pulse Temperature Last Period Pulse Oximetry BMI (Body Mass Index)       79 98.5  F (36.9  C) (Oral) 1985 (Approximate) 96% 40.6 kg/m2        Blood Pressure from Last 3 Encounters:   17 122/74   17 120/70   17 140/70    Weight from Last 3 Encounters:   17 241 lb (109.3 kg)   17 242 lb 4.8 oz (109.9 kg)   17 238 lb (108 kg)              We Performed the Following     Antinuclear antibody screen by EIA     Basic metabolic panel     CBC with platelets     D dimer, quantitative     EKG 12-lead complete w/read - Clinics     Erythrocyte sedimentation rate auto     Hemoglobin A1c     Protein electrophoresis     Reticulocyte count     TSH with free T4 reflex     "    Primary Care Provider Office Phone # Fax #    Aquilino Lilly [phone]       [address] [facility] [address]        Equal Access to Services     ALEJO ABERNATHY : Haddeborah brooke saxena theo [place], waaxda luqadaha, qaybta kaalmada yahir, rinku gann laAyushchrissie kvng. So [facility] [phone].    ATENCIÓN: Si habla español, tiene a jennings disposición servicios gratuitos de asistencia lingüística. Llame al [phone].    We comply with applicable federal civil rights laws and [place] laws. We do not discriminate on the basis of race, color, national origin, age, disability sex, sexual orientation or gender identity.            Thank you!     Thank you for choosing [facility]  for your care. Our goal is always to provide you with excellent care. Hearing back from our patients is one way we can continue to improve our services. Please take a few minutes to complete the written survey that you may receive in the mail after your visit with us. Thank you!             Your Updated Medication List - Protect others around you: Learn how to safely use, store and throw away your medicines at www.disposemymeds.org.          This list is accurate as of: 8/29/17 11:59 PM.  Always use your most recent med list.                   Brand Name Dispense Instructions for use Diagnosis    B-12 1000 MCG Tbcr     90 tablet    Take 1,000 mcg by mouth daily    B12 deficiency anemia       blood glucose monitoring lancets      Use to test blood sugar once daily or as directed.    Type 2 diabetes, HbA1c goal < 7% (H)       blood glucose monitoring test strip    ACCU-CHEK NOELLE    50 each    Use to test blood sugars 1 time daily or as directed.    Type 2 diabetes mellitus with stage 3 chronic kidney disease (H)       buPROPion 150 MG 24 hr tablet    WELLBUTRIN XL    90 tablet    Take 1 tablet (150 mg) by mouth every morning    Major depressive disorder, single episode, moderate (H)        citalopram 20 MG tablet    celeXA    90 tablet    Take 1 tablet (20 mg) by mouth daily    Major depressive disorder, single episode, moderate (H)       colchicine 0.6 MG tablet    COLCRYS    30 tablet    Take 2 tablets at the first sign of flare, take 1 additional tablet one hour later.    Gout, unspecified       fluticasone 50 MCG/ACT spray    FLONASE    16 g    Spray 1-2 sprays into both nostrils daily    URI (upper respiratory infection)       levothyroxine 88 MCG tablet    SYNTHROID/LEVOTHROID    90 tablet    Take 1 tablet (88 mcg) by mouth daily    Hypothyroidism, unspecified type       lisinopril 40 MG tablet    PRINIVIL/ZESTRIL    90 tablet    TAKE 1 TABLET(40 MG) BY MOUTH DAILY    Essential hypertension, benign       omeprazole 20 MG CR capsule    priLOSEC    90 capsule    Take 1 capsule (20 mg) by mouth daily    Gastroesophageal reflux disease, esophagitis presence not specified       order for DME     1 Device    Disp one glucometer that is covered by insurance with 1 RF. Also disp glucometer strips and lancets to check sugars daily. Disp 100 strips and  Lancets  with prn RFs for 1 year    Type 2 diabetes, HbA1c goal < 7% (H)       order for DME     1 each    Equipment being ordered:   AeroChamber (or similar device for inhaler use)    Bronchospasm       pravastatin 40 MG tablet    PRAVACHOL    90 tablet    Take 1 tablet (40 mg) by mouth daily at bedtime.    Hyperlipidemia LDL goal <100       traMADol 50 MG tablet    ULTRAM    20 tablet    Take 1 tablet (50 mg) by mouth every 6 hours as needed for moderate pain    Herpes zoster without complication       VENTOLIN  (90 BASE) MCG/ACT Inhaler   Generic drug:  albuterol     18 g    INHALE 2 PUFFS BY MOUTH INTO THE LUNGS EVERY 4 HOURS AS NEEDED FOR SHORTNESS OF BREATH OR WHEEZING.    SOB (shortness of breath)       zolpidem 5 MG tablet    AMBIEN    30 tablet    Take 1 tablet (5 mg) by mouth nightly as needed for sleep    Insomnia

## 2017-08-29 NOTE — NURSING NOTE
"Chief Complaint   Patient presents with     Chest Pain     Ear Problem       Initial /74  Pulse 79  Temp 98.5  F (36.9  C) (Oral)  Wt 241 lb (109.3 kg)  LMP 06/16/1985 (Approximate)  SpO2 96%  BMI 40.6 kg/m2 Estimated body mass index is 40.6 kg/(m^2) as calculated from the following:    Height as of 5/26/17: 5' 4.6\" (1.641 m).    Weight as of this encounter: 241 lb (109.3 kg).  Medication Reconciliation: complete  "

## 2017-08-29 NOTE — PROGRESS NOTES
"  SUBJECTIVE:   Mahogany Blanco is a 77 year old female who presents to clinic today for the following health issues:    Chest pain with SOB, > 8 months, Bilateral Ear pain, x 1 year      Problems taking medications regularly: No    Medication side effects: none    Diet: regular (no restrictions)    Pt's past medical history, family history, habits, medications and allergies were reviewed with the patient today.  See snap shot for  HCM status. Most recent lab results reviewed with pt. Problem list and histories reviewed & adjusted, as indicated.  Additional history as below:    Had SOB and chest pain just walking from parking lot of clinic to clinic  No pain with chewing. Intermittent pain  In ears. Hearing reduced. No sore throat. But occ dry. No nasal sx.   Breathing more labored in general with activity. Had fall 2 weeks ago when trying to get to bathroom quickly. No orthopnea, PND. Slept well last night. Has nocturia x1. No edema issues. Some chronic knee pains  No travel recently. Daughter recenty dx'd with blood clot. No recent travel  Has obstructive sleep apnea and not using CPAP  No F/C or other acute URI sx     Additional ROS:   Constitutional, HEENT, Cardiovascular, Pulmonary, GI and , Neuro, MSK and Psych review of systems/symptoms are otherwise negative or unchanged from previous, except as noted above.      OBJECTIVE:  /74  Pulse 79  Temp 98.5  F (36.9  C) (Oral)  Wt 241 lb (109.3 kg)  LMP 06/16/1985 (Approximate)  SpO2 96%  BMI 40.6 kg/m2   Estimated body mass index is 40.6 kg/(m^2) as calculated from the following:    Height as of 5/26/17: 5' 4.6\" (1.641 m).    Weight as of this encounter: 241 lb (109.3 kg).  Eye: PERRL, EOMI  HENT: ear canals and TM's normal and nose and mouth without ulcers or lesions. Minimal tenderness to palpation  Masseter musculature bilaterally. No periauricular adenopathy appreciated. No tenderness to palpation TA or TMJ areas. No subluxation of TMJ with jaw " movement and does not induce  Pain with jaw ROM.  Slight tenderness to insert otoscope into ear canals  Though no canal erythema  Neck: no adenopathy. Thyroid normal to palpation. No bruits  Pulm: Lungs clear to auscultation  except for slight crackle left base on initial exam. When pt got up on exam table for EKG, then had significant appearance of  Dyspnea with audible wheezing but MD spoke with pt to calm her breathing and pt relaxed and RR rate normalized again and wheeze disappeared immediately after relaxing again so wheeze appeared more stress related upper rather than deep airway related  CV: Regular rates and rhythm  GI: Soft, obese, nontender, Normal active bowel sounds, No hepatosplenomegaly or masses palpable  Ext: Peripheral pulses intact. Minimal BLE edema.  Neuro: Normal strength and tone, sensory exam grossly normal. No hypersensitivity to LTS over trigeminal nerve areas of face bilaterally    Assessment/Plan: (See plan discussion below for further details)  1. SOB (shortness of breath)  Work-up as below. Please note that D dimer cameback mildly elevated. Pt was contacted and underwent additional CT chest day of appt  - XR Chest 2 Views; Future  - D dimer, quantitative  - EKG 12-lead complete w/read - Clinics  - NM Exercise stress test; Future    2. Hypothyroidism, unspecified type  Due for lab f/u  - TSH with free T4 reflex    3. Type 2 diabetes mellitus with stage 3 chronic kidney disease, without long-term current use of insulin (H)  Not checking sugars. Labs as ordered. Previously well controlled  - Hemoglobin A1c  - Basic metabolic panel    4. CKD (chronic kidney disease) stage 3, GFR 30-59 ml/min  - Basic metabolic panel    5. Anemia, unspecified type  Labs as ordered to see if contributing to #1  - CBC with platelets  - Erythrocyte sedimentation rate auto  - Protein electrophoresis  - Antinuclear antibody screen by EIA  - Reticulocyte count  - D dimer, quantitative    6. Chest pain,  unspecified type    With CT chest not showing acute findings, needs eval of heart tor/o ischemia. Stress test scheduled for 9/1/17  - XR Chest 2 Views; Future  - D dimer, quantitative  - EKG 12-lead complete w/read - Clinics  - NM Exercise stress test; Future    7. Otalgia of both ears  Hx mild ESR elevation but no tenderness specifically over TA areas currently. Sx chronic. ? MSK related. Will await repeat labs        Aquilino Lilly MD  Internal Medicine Department  [facility]

## 2017-08-29 NOTE — ASSESSMENT & PLAN NOTE
Advance Care Planning 8/29/2017: ACP Review of Chart / Resources Provided:  Reviewed chart for advance care plan.  Mahogany DIO Blanco has no plan or code status on file. Discussed available resources and provided with information.   Added by Sheri Darling

## 2017-08-30 LAB
ALBUMIN SERPL ELPH-MCNC: 3.8 G/DL (ref 3.7–5.1)
ALPHA1 GLOB SERPL ELPH-MCNC: 0.3 G/DL (ref 0.2–0.4)
ALPHA2 GLOB SERPL ELPH-MCNC: 0.9 G/DL (ref 0.5–0.9)
ANA SER QL IA: 1.7
B-GLOBULIN SERPL ELPH-MCNC: 0.9 G/DL (ref 0.6–1)
GAMMA GLOB SERPL ELPH-MCNC: 1.5 G/DL (ref 0.7–1.6)
M PROTEIN SERPL ELPH-MCNC: 0.2 G/DL
PROT PATTERN SERPL ELPH-IMP: ABNORMAL
RETICS # AUTO: 55.6 10E9/L (ref 25–95)
RETICS/RBC NFR AUTO: 1.7 % (ref 0.5–2)

## 2017-09-01 ENCOUNTER — HOSPITAL ENCOUNTER (OUTPATIENT)
Dept: NUCLEAR MEDICINE | Facility: CLINIC | Age: 77
Setting detail: NUCLEAR MEDICINE
End: 2017-09-01
Attending: INTERNAL MEDICINE | Admitting: INTERNAL MEDICINE
Payer: COMMERCIAL

## 2017-09-01 ENCOUNTER — HOSPITAL ENCOUNTER (OUTPATIENT)
Dept: CARDIOLOGY | Facility: CLINIC | Age: 77
End: 2017-09-01
Attending: INTERNAL MEDICINE | Admitting: INTERNAL MEDICINE
Payer: COMMERCIAL

## 2017-09-01 ENCOUNTER — HOSPITAL ENCOUNTER (OUTPATIENT)
Facility: CLINIC | Age: 77
Discharge: HOME | End: 2017-09-01
Attending: INTERNAL MEDICINE | Admitting: INTERNAL MEDICINE
Payer: COMMERCIAL

## 2017-09-01 ENCOUNTER — HOSPITAL ENCOUNTER (OUTPATIENT)
Dept: NUCLEAR MEDICINE | Facility: CLINIC | Age: 77
Setting detail: NUCLEAR MEDICINE
Discharge: HOME | End: 2017-09-01
Attending: INTERNAL MEDICINE | Admitting: INTERNAL MEDICINE
Payer: COMMERCIAL

## 2017-09-01 VITALS
HEART RATE: 73 BPM | WEIGHT: 265 LBS | HEIGHT: 64 IN | DIASTOLIC BLOOD PRESSURE: 63 MMHG | RESPIRATION RATE: 18 BRPM | OXYGEN SATURATION: 99 % | SYSTOLIC BLOOD PRESSURE: 142 MMHG | BODY MASS INDEX: 45.24 KG/M2

## 2017-09-01 DIAGNOSIS — R06.02 SOB (SHORTNESS OF BREATH): ICD-10-CM

## 2017-09-01 DIAGNOSIS — R07.9 CHEST PAIN, UNSPECIFIED TYPE: ICD-10-CM

## 2017-09-01 PROCEDURE — 78452 HT MUSCLE IMAGE SPECT MULT: CPT | Mod: 26 | Performed by: INTERNAL MEDICINE

## 2017-09-01 PROCEDURE — 40000855 ZZH STATISTIC ECHO STRESS OR NM NPI

## 2017-09-01 PROCEDURE — 93016 CV STRESS TEST SUPVJ ONLY: CPT | Performed by: INTERNAL MEDICINE

## 2017-09-01 PROCEDURE — 93017 CV STRESS TEST TRACING ONLY: CPT

## 2017-09-01 PROCEDURE — 25000128 H RX IP 250 OP 636: Performed by: INTERNAL MEDICINE

## 2017-09-01 PROCEDURE — 93018 CV STRESS TEST I&R ONLY: CPT | Performed by: INTERNAL MEDICINE

## 2017-09-01 RX ORDER — AMINOPHYLLINE 25 MG/ML
50-100 INJECTION, SOLUTION INTRAVENOUS
Status: DISCONTINUED | OUTPATIENT
Start: 2017-09-01 | End: 2017-09-01 | Stop reason: HOSPADM

## 2017-09-01 RX ORDER — REGADENOSON 0.08 MG/ML
0.4 INJECTION, SOLUTION INTRAVENOUS ONCE
Status: COMPLETED | OUTPATIENT
Start: 2017-09-01 | End: 2017-09-01

## 2017-09-01 RX ORDER — ACYCLOVIR 200 MG/1
0-1 CAPSULE ORAL
Status: DISCONTINUED | OUTPATIENT
Start: 2017-09-01 | End: 2017-09-01 | Stop reason: HOSPADM

## 2017-09-01 RX ORDER — ALBUTEROL SULFATE 90 UG/1
2 AEROSOL, METERED RESPIRATORY (INHALATION) EVERY 5 MIN PRN
Status: DISCONTINUED | OUTPATIENT
Start: 2017-09-01 | End: 2017-09-01 | Stop reason: HOSPADM

## 2017-09-01 RX ADMIN — REGADENOSON 0.4 MG: 0.08 INJECTION, SOLUTION INTRAVENOUS at 11:23

## 2017-09-01 RX ADMIN — TETROFOSMIN 10.8 MCI.: 1.38 INJECTION, POWDER, LYOPHILIZED, FOR SOLUTION INTRAVENOUS at 08:50

## 2017-09-01 RX ADMIN — TETROFOSMIN 31.2 MCI.: 1.38 INJECTION, POWDER, LYOPHILIZED, FOR SOLUTION INTRAVENOUS at 11:25

## 2017-09-01 NOTE — PROGRESS NOTES
1112 Pt here for outpt lexiscan stress test. Explained to pt and dgtr. dgtr here with her. Denies pain. Heart tones regular. Lungs clear with rales in bases. Denies history of lung disease. No caffeine in last 12 hrs. Iv good blood return and flushes well. o2 sat 97%.  1130 tolerated lexiscan test well. No pain during or after, no shortness of breath. No symptoms. VSS. dgtr will take her to eat and they know to recheck back into lobby at 1230. No light headedness.

## 2017-09-03 PROBLEM — R91.1 LUNG NODULE: Status: ACTIVE | Noted: 2017-08-29

## 2017-09-06 ENCOUNTER — TELEPHONE (OUTPATIENT)
Dept: INTERNAL MEDICINE | Facility: CLINIC | Age: 77
End: 2017-09-06

## 2017-09-06 DIAGNOSIS — R06.02 SOB (SHORTNESS OF BREATH): Primary | ICD-10-CM

## 2017-09-06 NOTE — TELEPHONE ENCOUNTER
Spoke with patient and reviewed her cardiac stress test. No ischemia seen. Recent labs show some chronic anemia which is mild and unlikely to be contributing to patient's level of shortness of breath with exertion. Some fibrotic changes were seen on recent CT of the chest. Will therefore have patient undergo complete PFTs at [facility] to assess for restrictive/obstructive issues and or reduced diffusion capacity. If PFTs show abnormalities and not responsive to bronchodilator therapy, will then refer on to pulmonary for further opinion. Patient informed that [facility] will call to schedule the procedure but she was also given telephone number to call if she has not heard from the schedulers within 2 days

## 2017-09-12 ENCOUNTER — HOSPITAL ENCOUNTER (OUTPATIENT)
Dept: RESPIRATORY THERAPY | Facility: CLINIC | Age: 77
End: 2017-09-12
Attending: INTERNAL MEDICINE
Payer: COMMERCIAL

## 2017-09-12 ENCOUNTER — HOSPITAL ENCOUNTER (OUTPATIENT)
Dept: LAB | Facility: CLINIC | Age: 77
Discharge: HOME | End: 2017-09-12
Attending: INTERNAL MEDICINE | Admitting: INTERNAL MEDICINE
Payer: COMMERCIAL

## 2017-09-12 DIAGNOSIS — R06.02 SOB (SHORTNESS OF BREATH): ICD-10-CM

## 2017-09-12 LAB — HGB BLD-MCNC: 10.5 G/DL (ref 11.7–15.7)

## 2017-09-12 PROCEDURE — 36415 COLL VENOUS BLD VENIPUNCTURE: CPT | Performed by: INTERNAL MEDICINE

## 2017-09-12 PROCEDURE — 85018 HEMOGLOBIN: CPT | Performed by: INTERNAL MEDICINE

## 2017-09-14 LAB
DLCOCOR-%PRED-PRE: 50 %
DLCOCOR-PRE: 9.82 ML/MIN/MMHG
DLCOUNC-%PRED-PRE: 45 %
DLCOUNC-PRE: 8.81 ML/MIN/MMHG
DLCOUNC-PRED: 19.49 ML/MIN/MMHG
ERV-%PRED-PRE: 2005 %
ERV-PRE: 0.2 L
ERV-PRED: 0.01 L
EXPTIME-PRE: 6.86 SEC
FEF2575-%PRED-PRE: 96 %
FEF2575-PRE: 1.48 L/SEC
FEF2575-PRED: 1.53 L/SEC
FEFMAX-%PRED-PRE: 67 %
FEFMAX-PRE: 3.34 L/SEC
FEFMAX-PRED: 4.96 L/SEC
FEV1-%PRED-PRE: 80 %
FEV1-PRE: 1.47 L
FEV1FEV6-PRE: 81 %
FEV1FEV6-PRED: 78 %
FEV1FVC-PRE: 81 %
FEV1FVC-PRED: 78 %
FEV1SVC-PRE: 82 %
FEV1SVC-PRED: 64 %
FIFMAX-PRE: 2.72 L/SEC
FRCPLETH-%PRED-PRE: 59 %
FRCPLETH-PRE: 1.61 L
FRCPLETH-PRED: 2.69 L
FVC-%PRED-PRE: 76 %
FVC-PRE: 1.82 L
FVC-PRED: 2.37 L
IC-%PRED-PRE: 56 %
IC-PRE: 1.6 L
IC-PRED: 2.83 L
RVPLETH-%PRED-PRE: 65 %
RVPLETH-PRE: 1.41 L
RVPLETH-PRED: 2.16 L
TLCPLETH-%PRED-PRE: 65 %
TLCPLETH-PRE: 3.21 L
TLCPLETH-PRED: 4.86 L
VA-%PRED-PRE: 54 %
VA-PRE: 2.72 L
VC-%PRED-PRE: 63 %
VC-PRE: 1.8 L
VC-PRED: 2.84 L

## 2017-09-18 ENCOUNTER — TELEPHONE (OUTPATIENT)
Dept: NURSING | Facility: CLINIC | Age: 77
End: 2017-09-18

## 2017-09-18 DIAGNOSIS — D64.9 ANEMIA, UNSPECIFIED TYPE: ICD-10-CM

## 2017-09-18 DIAGNOSIS — D47.2 MGUS (MONOCLONAL GAMMOPATHY OF UNKNOWN SIGNIFICANCE): ICD-10-CM

## 2017-09-18 DIAGNOSIS — N18.30 CKD (CHRONIC KIDNEY DISEASE) STAGE 3, GFR 30-59 ML/MIN (H): ICD-10-CM

## 2017-09-18 DIAGNOSIS — R76.8 ELEVATED ANTINUCLEAR ANTIBODY (ANA) LEVEL: Primary | ICD-10-CM

## 2017-09-18 NOTE — TELEPHONE ENCOUNTER
Patient calling inquiring about PFT results.  Please see results under procedures tab and advise.  Thank you.

## 2017-09-22 PROBLEM — R76.8 ELEVATED ANTINUCLEAR ANTIBODY (ANA) LEVEL: Status: ACTIVE | Noted: 2017-09-22

## 2017-09-22 PROBLEM — D47.2 MGUS (MONOCLONAL GAMMOPATHY OF UNKNOWN SIGNIFICANCE): Status: ACTIVE | Noted: 2017-09-22

## 2017-09-22 NOTE — TELEPHONE ENCOUNTER
Spoke with pt earlier tonight and reviewed PFts with restrictive pattern (likely related to obesity) and some reduced diffusion capacity of unclear cause. Pt did have some scarring on CT chest. Offered pulm referral to see if they think pulm fibrosis present and  Possibly to try acute treatment to see if reponds vs scarring but pt prefers to try carbreduction/dietand exercise for weight loss and observation.  Will have repeat labs in 3 mos and then see me a few days later for follow-up. Johnny call clinic earlier if increased SOB. Pt states overall felt better recently re: breathing. Future labs ordered

## 2017-11-09 DIAGNOSIS — F32.1 MAJOR DEPRESSIVE DISORDER, SINGLE EPISODE, MODERATE (H): ICD-10-CM

## 2017-11-15 RX ORDER — CITALOPRAM HYDROBROMIDE 20 MG/1
TABLET ORAL
Qty: 90 TABLET | Refills: 0 | Status: SHIPPED | OUTPATIENT
Start: 2017-11-15 | End: 2018-02-20

## 2017-11-15 ASSESSMENT — PATIENT HEALTH QUESTIONNAIRE - PHQ9: SUM OF ALL RESPONSES TO PHQ QUESTIONS 1-9: 3

## 2017-11-20 ENCOUNTER — TELEPHONE (OUTPATIENT)
Dept: INTERNAL MEDICINE | Facility: CLINIC | Age: 77
End: 2017-11-20

## 2017-11-20 DIAGNOSIS — E55.9 VITAMIN D DEFICIENCY: Primary | ICD-10-CM

## 2017-11-20 NOTE — TELEPHONE ENCOUNTER
Reason for Call:  Other call back    Detailed comments: Pt wants to know what dosage she should take of Vit D.  She stopped taking vit D about 6 months ago.  Did she have a script or did she buy it over the counter?    Phone Number Patient can be reached at: Home number on file [phone] (home)    Best Time: anytime    Can we leave a detailed message on this number? YES    Call taken on 11/20/2017 at 12:15 PM by NICOLE PORTILLO

## 2017-11-22 RX ORDER — CHOLECALCIFEROL (VITAMIN D3) 50 MCG
2000 TABLET ORAL DAILY
Qty: 100 TABLET | Refills: 3
Start: 2017-11-22 | End: 2020-06-30

## 2017-11-22 RX ORDER — CHOLECALCIFEROL (VITAMIN D3) 50 MCG
2000 TABLET ORAL 2 TIMES DAILY
Qty: 60 TABLET | Refills: 0
Start: 2017-11-22 | End: 2017-12-22

## 2017-11-23 NOTE — TELEPHONE ENCOUNTER
Pt to  start Vitamin D3 (2,000 IU tablet), 2 tab daily  (total 4,000 IU) in AM for 1 month, then 1 tab daily. She can purchase the medication over the counter at any pharmacy.  Pt to have a nonfasting Vit D levels rechecked in 3 months  Pt also due to have other nonfasting labs done in mid December to follow-up on anemia and other previous lab abnormalities. See me 2-3 days after the labs are drawn in later December

## 2017-11-24 NOTE — TELEPHONE ENCOUNTER
Pt was notified. Appointment scheduled for Lab on December 20, 2017 and Follow up with Dr. Lilly on December 22, 2017

## 2017-11-25 DIAGNOSIS — F32.1 MAJOR DEPRESSIVE DISORDER, SINGLE EPISODE, MODERATE (H): ICD-10-CM

## 2017-11-27 NOTE — TELEPHONE ENCOUNTER
Patient wants if she should vitamin b 3 should she take 4 of these a day at 500 mgs  Total of 2000mgs please call pt regarding this phone [phone]

## 2017-11-28 RX ORDER — BUPROPION HYDROCHLORIDE 150 MG/1
TABLET ORAL
Qty: 90 TABLET | Refills: 1 | Status: SHIPPED | OUTPATIENT
Start: 2017-11-28 | End: 2018-07-20

## 2017-11-28 NOTE — TELEPHONE ENCOUNTER
Pt should take Vit D 2000 units BID x one month and then take 2000 units once a day.  Detailed message left on VM.

## 2017-12-20 DIAGNOSIS — N18.30 CKD (CHRONIC KIDNEY DISEASE) STAGE 3, GFR 30-59 ML/MIN (H): ICD-10-CM

## 2017-12-20 DIAGNOSIS — D47.2 MGUS (MONOCLONAL GAMMOPATHY OF UNKNOWN SIGNIFICANCE): ICD-10-CM

## 2017-12-20 DIAGNOSIS — R76.8 ELEVATED ANTINUCLEAR ANTIBODY (ANA) LEVEL: ICD-10-CM

## 2017-12-20 DIAGNOSIS — D64.9 ANEMIA, UNSPECIFIED TYPE: ICD-10-CM

## 2017-12-20 LAB
ANION GAP SERPL CALCULATED.3IONS-SCNC: 6 MMOL/L (ref 3–14)
BUN SERPL-MCNC: 22 MG/DL (ref 7–30)
CALCIUM SERPL-MCNC: 8.9 MG/DL (ref 8.5–10.1)
CHLORIDE SERPL-SCNC: 111 MMOL/L (ref 94–109)
CO2 SERPL-SCNC: 26 MMOL/L (ref 20–32)
CREAT SERPL-MCNC: 1.18 MG/DL (ref 0.52–1.04)
ERYTHROCYTE [DISTWIDTH] IN BLOOD BY AUTOMATED COUNT: 13.2 % (ref 10–15)
GFR SERPL CREATININE-BSD FRML MDRD: 44 ML/MIN/1.7M2
GLUCOSE SERPL-MCNC: 108 MG/DL (ref 70–99)
HCT VFR BLD AUTO: 34.1 % (ref 35–47)
HGB BLD-MCNC: 10.7 G/DL (ref 11.7–15.7)
MCH RBC QN AUTO: 33.1 PG (ref 26.5–33)
MCHC RBC AUTO-ENTMCNC: 31.4 G/DL (ref 31.5–36.5)
MCV RBC AUTO: 106 FL (ref 78–100)
PLATELET # BLD AUTO: 147 10E9/L (ref 150–450)
POTASSIUM SERPL-SCNC: 5 MMOL/L (ref 3.4–5.3)
RBC # BLD AUTO: 3.23 10E12/L (ref 3.8–5.2)
SODIUM SERPL-SCNC: 143 MMOL/L (ref 133–144)
WBC # BLD AUTO: 10.3 10E9/L (ref 4–11)

## 2017-12-20 PROCEDURE — 86225 DNA ANTIBODY NATIVE: CPT | Performed by: INTERNAL MEDICINE

## 2017-12-20 PROCEDURE — 83883 ASSAY NEPHELOMETRY NOT SPEC: CPT | Performed by: INTERNAL MEDICINE

## 2017-12-20 PROCEDURE — 86039 ANTINUCLEAR ANTIBODIES (ANA): CPT | Performed by: INTERNAL MEDICINE

## 2017-12-20 PROCEDURE — 36415 COLL VENOUS BLD VENIPUNCTURE: CPT | Performed by: INTERNAL MEDICINE

## 2017-12-20 PROCEDURE — 80048 BASIC METABOLIC PNL TOTAL CA: CPT | Performed by: INTERNAL MEDICINE

## 2017-12-20 PROCEDURE — 86334 IMMUNOFIX E-PHORESIS SERUM: CPT | Performed by: INTERNAL MEDICINE

## 2017-12-20 PROCEDURE — 99000 SPECIMEN HANDLING OFFICE-LAB: CPT | Performed by: INTERNAL MEDICINE

## 2017-12-20 PROCEDURE — 82784 ASSAY IGA/IGD/IGG/IGM EACH: CPT | Performed by: INTERNAL MEDICINE

## 2017-12-20 PROCEDURE — 86038 ANTINUCLEAR ANTIBODIES: CPT | Performed by: INTERNAL MEDICINE

## 2017-12-20 PROCEDURE — 86235 NUCLEAR ANTIGEN ANTIBODY: CPT | Performed by: INTERNAL MEDICINE

## 2017-12-20 PROCEDURE — 83516 IMMUNOASSAY NONANTIBODY: CPT | Mod: 90 | Performed by: INTERNAL MEDICINE

## 2017-12-20 PROCEDURE — 85027 COMPLETE CBC AUTOMATED: CPT | Performed by: INTERNAL MEDICINE

## 2017-12-20 PROCEDURE — 86226 DNA ANTIBODY SINGLE STRAND: CPT | Mod: 90 | Performed by: INTERNAL MEDICINE

## 2017-12-20 NOTE — LETTER
[facility]  [address] [address]  [phone]        December 26, 2017    Mahogany Blanco  [address] [facility] [address]              Dear Mahogany Blanco    This is to remind you that your fasting labs are  due.    You may call our office at  [phone]  to schedule an appointment.    Please disregard this notice if you have already had your labs drawn or made an appointment.        Sincerely,        [facility]

## 2017-12-21 LAB
ANA PAT SER IF-IMP: ABNORMAL
ANA SER QL IF: POSITIVE
ANA TITR SER IF: ABNORMAL {TITER}
DSDNA AB SER-ACNC: 2 IU/ML
ENA JO1 IGG SER-ACNC: <0.2 AI (ref 0–0.9)
ENA RNP IGG SER IA-ACNC: 2 AI (ref 0–0.9)
ENA SCL70 IGG SER IA-ACNC: <0.2 AI (ref 0–0.9)
ENA SM IGG SER-ACNC: <0.2 AI (ref 0–0.9)
ENA SS-A IGG SER IA-ACNC: <0.2 AI (ref 0–0.9)
ENA SS-B IGG SER IA-ACNC: <0.2 AI (ref 0–0.9)
IGA SERPL-MCNC: 274 MG/DL (ref 70–380)
IGG SERPL-MCNC: 1400 MG/DL (ref 695–1620)
IGM SERPL-MCNC: 208 MG/DL (ref 60–265)
KAPPA LC UR-MCNC: 3.64 MG/DL (ref 0.33–1.94)
KAPPA LC/LAMBDA SER: 0.71 {RATIO} (ref 0.26–1.65)
LAMBDA LC SERPL-MCNC: 5.15 MG/DL (ref 0.57–2.63)
PROT ELPH PNL UR ELPH: NORMAL
PROT PATTERN SERPL IFE-IMP: NORMAL

## 2017-12-22 LAB — HISTONE IGG SER IA-ACNC: 0.4 UNITS (ref 0–0.9)

## 2017-12-24 LAB — SSDNA IGG SER-ACNC: 15 EU (ref 0–19)

## 2018-01-04 ENCOUNTER — TELEPHONE (OUTPATIENT)
Dept: INTERNAL MEDICINE | Facility: CLINIC | Age: 78
End: 2018-01-04

## 2018-01-04 NOTE — TELEPHONE ENCOUNTER
Reason for Call:  Request for results:    Name of test or procedure: labs    Date of test of procedure: 12/20/17    Location of the test or procedure: [facility]    OK to leave the result message on voice mail or with a family member? YES    Phone number Patient can be reached at:  Home number on file [phone] (home)    Additional comments: pt called to speak to a nurse regarding her lab results, pt said that she is having hard time finding someone to bring her to the clinic and that her daughters are not able to get off work. Please call pt back with lab results. ASAP.    Call taken on 1/4/2018 at 2:36 PM by LORRIE LEY

## 2018-01-05 NOTE — TELEPHONE ENCOUNTER
Lab results very complicated and not able to explain in written note. Will try to speak with pt next week by phone when return to clinic but would be best if could be seen in clinic as would be helpful to examine pt also. Leave encounter open and inform pt

## 2018-01-19 ENCOUNTER — OFFICE VISIT (OUTPATIENT)
Dept: INTERNAL MEDICINE | Facility: CLINIC | Age: 78
End: 2018-01-19
Payer: COMMERCIAL

## 2018-01-19 VITALS
HEART RATE: 58 BPM | SYSTOLIC BLOOD PRESSURE: 140 MMHG | OXYGEN SATURATION: 100 % | TEMPERATURE: 97.8 F | DIASTOLIC BLOOD PRESSURE: 78 MMHG

## 2018-01-19 DIAGNOSIS — D64.9 ANEMIA, UNSPECIFIED TYPE: ICD-10-CM

## 2018-01-19 DIAGNOSIS — D69.6 THROMBOCYTOPENIA (H): ICD-10-CM

## 2018-01-19 DIAGNOSIS — D47.2 MGUS (MONOCLONAL GAMMOPATHY OF UNKNOWN SIGNIFICANCE): ICD-10-CM

## 2018-01-19 DIAGNOSIS — G47.30 SLEEP APNEA, UNSPECIFIED TYPE: ICD-10-CM

## 2018-01-19 DIAGNOSIS — E03.9 HYPOTHYROIDISM, UNSPECIFIED TYPE: Primary | ICD-10-CM

## 2018-01-19 DIAGNOSIS — N18.30 CKD (CHRONIC KIDNEY DISEASE) STAGE 3, GFR 30-59 ML/MIN (H): ICD-10-CM

## 2018-01-19 DIAGNOSIS — R53.83 OTHER FATIGUE: ICD-10-CM

## 2018-01-19 DIAGNOSIS — E66.01 MORBID OBESITY, UNSPECIFIED OBESITY TYPE (H): ICD-10-CM

## 2018-01-19 DIAGNOSIS — E11.22 TYPE 2 DIABETES MELLITUS WITH STAGE 3 CHRONIC KIDNEY DISEASE, WITHOUT LONG-TERM CURRENT USE OF INSULIN (H): ICD-10-CM

## 2018-01-19 DIAGNOSIS — M25.561 CHRONIC PAIN OF BOTH KNEES: ICD-10-CM

## 2018-01-19 DIAGNOSIS — G89.29 CHRONIC PAIN OF BOTH KNEES: ICD-10-CM

## 2018-01-19 DIAGNOSIS — D80.8 LIGHT CHAIN DISEASE (H): ICD-10-CM

## 2018-01-19 DIAGNOSIS — R76.8 ELEVATED ANTINUCLEAR ANTIBODY (ANA) LEVEL: ICD-10-CM

## 2018-01-19 DIAGNOSIS — N18.30 TYPE 2 DIABETES MELLITUS WITH STAGE 3 CHRONIC KIDNEY DISEASE, WITHOUT LONG-TERM CURRENT USE OF INSULIN (H): ICD-10-CM

## 2018-01-19 DIAGNOSIS — E53.8 B12 DEFICIENCY: ICD-10-CM

## 2018-01-19 DIAGNOSIS — M25.562 CHRONIC PAIN OF BOTH KNEES: ICD-10-CM

## 2018-01-19 PROCEDURE — 99214 OFFICE O/P EST MOD 30 MIN: CPT | Performed by: INTERNAL MEDICINE

## 2018-01-19 NOTE — PATIENT INSTRUCTIONS
Have someone  observe your sleep for 5-10 minutes to see if  you are having sleep apnea/stopping of breathing/holdine your breath contest. Call update to nurseline [phone]. If sleep apnea, will refer to the  Sleep Clinic  Tylenol ES (500mg tabs), 1-2 tabs up to 3 times a day as needed for pain. Max dose in 24 hrs is 3,000mg.  Ice the knees as needed  If knee pains persists, then see  [facility] for possible knee injection.  Sites include [facility] [phone] or [place] [phone]. Call for appointment.   Fasting labs in 1 month. Will send letter. For fasting labs, please refrain from eating for 8 hours or more.  Be sure to  drink water and take your  medications the day of the test.   Repeat nonfasting kidney/bone marrow labs in mid July. Would not intervene with bone marrow biopsy at this time with stability.  Continue current medicaitons. WIll do refills on file at pharmacy when needed  Stop Vitamin B3 (Niacin)   Start Vitamin b12, 1,000 mcg tab daily

## 2018-01-19 NOTE — MR AVS SNAPSHOT
After Visit Summary   1/19/2018    Mahogany Blanco    MRN: [id number]           Patient Information     Date Of Birth          1940        Visit Information        Provider Department      1/19/2018 4:00 PM Aquilino Lilly MD [facility]        Care Instructions    Have someone  observe your sleep for 5-10 minutes to see if  you are having sleep apnea/stopping of breathing/holdine your breath contest. Call update to nurseline [phone]. If sleep apnea, will refer to the  Sleep Clinic  Tylenol ES (500mg tabs), 1-2 tabs up to 3 times a day as needed for pain. Max dose in 24 hrs is 3,000mg.  Ice the knees as needed  If knee pains persists, then see  [facility] for possible knee injection.  Sites include [facility] [phone] or [place] [phone]. Call for appointment.   Fasting labs in 1 month. Will send letter. For fasting labs, please refrain from eating for 8 hours or more.  Be sure to  drink water and take your  medications the day of the test.   Repeat nonfasting kidney/bone marrow labs in mid July. Would not intervene with bone marrow biopsy at this time with stability.  Continue current medicaitons. WIll do refills on file at pharmacy when needed  Stop Vitamin B3 (Niacin)   Start Vitamin b12, 1,000 mcg tab daily            Follow-ups after your visit        Who to contact     If you have questions or need follow up information about today's clinic visit or your schedule please contact [facility] directly at [phone].  Normal or non-critical lab and imaging results will be communicated to you by MyChart, letter or phone within 4 business days after the clinic has received the results. If you do not hear from us within 7 days, please contact the clinic through MyChart or phone. If you have a critical or abnormal lab result, we will notify you by phone as soon as possible.  Submit refill requests through MyChart or call  "your pharmacy and they will forward the refill request to us. Please allow 3 business days for your refill to be completed.          Additional Information About Your Visit        MyChart Information     [employer] lets you send messages to your doctor, view your test results, renew your prescriptions, schedule appointments and more. To sign up, go to [email or web address] . Click on \"Log in\" on the left side of the screen, which will take you to the Welcome page. Then click on \"Sign up Now\" on the right side of the page.     You will be asked to enter the access code listed below, as well as some personal information. Please follow the directions to create your username and password.     Your access code is: [email or web address]  Expires: 2018  4:43 PM     Your access code will  in 90 days. If you need help or a new code, please call your [place] clinic or [phone].        Care EveryWhere ID     This is your Care EveryWhere ID. This could be used by other organizations to access your [place] medical records  [id number]        Your Vitals Were     Pulse Temperature Last Period Pulse Oximetry          58 97.8  F (36.6  C) (Oral) 1985 (Approximate) 100%         Blood Pressure from Last 3 Encounters:   18 140/78   17 142/63   17 122/74    Weight from Last 3 Encounters:   17 265 lb (120.2 kg)   17 241 lb (109.3 kg)   17 242 lb 4.8 oz (109.9 kg)              We Performed the Following     DEPRESSION ACTION PLAN (DAP)        Primary Care Provider Office Phone # Fax #    Aquilino Lilly [phone]       [address] [place] [id number]        Equal Access to Services     ALEJO ABERNATHY : Ej Woodard, wicho darby, qafabian nunoalrinku johnson. [facility] [phone].    ATENCIÓN: Si habla español, tiene a jennings disposición servicios gratuitos de asistencia lingüística. [place] [phone].    We " comply with applicable federal civil rights laws and [place] laws. We do not discriminate on the basis of race, color, national origin, age, disability, sex, sexual orientation, or gender identity.            Thank you!     Thank you for choosing [facility]  for your care. Our goal is always to provide you with excellent care. Hearing back from our patients is one way we can continue to improve our services. Please take a few minutes to complete the written survey that you may receive in the mail after your visit with us. Thank you!             Your Updated Medication List - Protect others around you: Learn how to safely use, store and throw away your medicines at www.disposemymeds.org.          This list is accurate as of: 1/19/18  4:44 PM.  Always use your most recent med list.                   Brand Name Dispense Instructions for use Diagnosis    B-12 1000 MCG Tbcr     90 tablet    Take 1,000 mcg by mouth daily    B12 deficiency anemia       blood glucose monitoring lancets      Use to test blood sugar once daily or as directed.    Type 2 diabetes, HbA1c goal < 7% (H)       blood glucose monitoring test strip    ACCU-CHEK NOELLE    50 each    Use to test blood sugars 1 time daily or as directed.    Type 2 diabetes mellitus with stage 3 chronic kidney disease (H)       buPROPion 150 MG 24 hr tablet    WELLBUTRIN XL    90 tablet    TAKE 1 TABLET(150 MG) BY MOUTH EVERY MORNING    Major depressive disorder, single episode, moderate (H)       citalopram 20 MG tablet    celeXA    90 tablet    TAKE 1 TABLET(20 MG) BY MOUTH DAILY    Major depressive disorder, single episode, moderate (H)       fluticasone 50 MCG/ACT spray    FLONASE    16 g    Spray 1-2 sprays into both nostrils daily    URI (upper respiratory infection)       levothyroxine 88 MCG tablet    SYNTHROID/LEVOTHROID    90 tablet    Take 1 tablet (88 mcg) by mouth daily    Hypothyroidism, unspecified type       lisinopril 40 MG  tablet    PRINIVIL/ZESTRIL    90 tablet    TAKE 1 TABLET(40 MG) BY MOUTH DAILY    Essential hypertension, benign       omeprazole 20 MG CR capsule    priLOSEC    90 capsule    Take 1 capsule (20 mg) by mouth daily    Gastroesophageal reflux disease, esophagitis presence not specified       order for DME     1 Device    Disp one glucometer that is covered by insurance with 1 RF. Also disp glucometer strips and lancets to check sugars daily. Disp 100 strips and  Lancets  with prn RFs for 1 year    Type 2 diabetes, HbA1c goal < 7% (H)       order for DME     1 each    Equipment being ordered:   AeroChamber (or similar device for inhaler use)    Bronchospasm       pravastatin 40 MG tablet    PRAVACHOL    90 tablet    Take 1 tablet (40 mg) by mouth daily at bedtime.    Hyperlipidemia LDL goal <100       VENTOLIN  (90 BASE) MCG/ACT Inhaler   Generic drug:  albuterol     18 g    INHALE 2 PUFFS BY MOUTH INTO THE LUNGS EVERY 4 HOURS AS NEEDED FOR SHORTNESS OF BREATH OR WHEEZING.    SOB (shortness of breath)       vitamin D 2000 UNITS tablet     100 tablet    Take 2,000 Units by mouth daily    Vitamin D deficiency

## 2018-01-19 NOTE — PROGRESS NOTES
SUBJECTIVE:   Mahogany Blanco is a 77 year old female who presents to clinic today for the following health issues:    Chief Complaint   Patient presents with     Results     from lab work completed on 12/20/2017       Pt's past medical history, family history, habits, medications and allergies were reviewed with the patient today.  See snap shot for  HCM status. Most recent lab results reviewed with pt. Problem list and histories reviewed & adjusted, as indicated.  Additional history as below:      Component      Latest Ref Rng & Units 4/4/2009   Alpha 2 Fraction      0.5 - 0.9 g/dL 0.8   Beta Fraction      0.6 - 1.0 g/dL 0.9   Gamma Fraction      0.7 - 1.6 g/dL 1.5   Monoclonal Peak      0.0 g/dL 0.0   ELP Interpretation:       Essentially  normal electrophoretic pattern.  No monoclonal protein seen. . . .   Sodium      133 - 144 mmol/L    Potassium      3.4 - 5.3 mmol/L    Chloride      94 - 109 mmol/L    Carbon Dioxide      20 - 32 mmol/L    Anion Gap      3 - 14 mmol/L    Glucose      70 - 99 mg/dL    Urea Nitrogen      7 - 30 mg/dL    Creatinine      0.52 - 1.04 mg/dL    GFR Estimate      >60 mL/min/1.7m2    GFR Estimate If Black      >60 mL/min/1.7m2    Calcium      8.5 - 10.1 mg/dL    Bilirubin Total      0.2 - 1.3 mg/dL    Albumin      3.4 - 5.0 g/dL    Protein Total      6.8 - 8.8 g/dL    Alkaline Phosphatase      40 - 150 U/L    ALT      0 - 50 U/L    AST      0 - 45 U/L    WBC      4.0 - 11.0 10e9/L    RBC Count      3.8 - 5.2 10e12/L    Hemoglobin      11.7 - 15.7 g/dL    Hematocrit      35.0 - 47.0 %    MCV      78 - 100 fl    MCH      26.5 - 33.0 pg    MCHC      31.5 - 36.5 g/dL    RDW      10.0 - 15.0 %    Platelet Count      150 - 450 10e9/L    Immunofixation ELP          IGG      695 - 1620 mg/dL    IGA      70 - 380 mg/dL    IGM      60 - 265 mg/dL    Kappa Free Lt Chain      0.33 - 1.94 mg/dL    Lambda Free Lt Chain      0.57 - 2.63 mg/dL    Kappa Lambda Ratio      0.26 - 1.65    JACINTO  interpretation      NEG:Negative    JACINTO pattern 1          JACINTO titer 1          JACINTO Screen by EIA      <1.0    Hemoglobin A1C      4.3 - 6.0 %    Histone Antibody,IgG      0.0 - 0.9 Units    Melva 1 Antibody IgG      0.0 - 0.9 AI    Garcia DARIUSZ Antibody IgG      0.0 - 0.9 AI    RNP Antibody IgG      0.0 - 0.9 AI    SSB (La) (DARIUSZ) Antibody, IgG      0.0 - 0.9 AI    SSA (Ro) (DARIUSZ) Antibody, IgG      0.0 - 0.9 AI    SSDNA Antibody IgG      0 - 19 EU    DNA-ds      <10 IU/mL    Scleroderma Antibody Scl-70 DARIUSZ IgG      0.0 - 0.9 AI    Immunofix ELP Urine            Component      Latest Ref Rng & Units 11/12/2012 4/18/2016 2/3/2017   Alpha 2 Fraction      0.5 - 0.9 g/dL      Beta Fraction      0.6 - 1.0 g/dL      Gamma Fraction      0.7 - 1.6 g/dL      Monoclonal Peak      0.0 g/dL      ELP Interpretation:            Sodium      133 - 144 mmol/L  141    Potassium      3.4 - 5.3 mmol/L  4.8    Chloride      94 - 109 mmol/L  108    Carbon Dioxide      20 - 32 mmol/L  27    Anion Gap      3 - 14 mmol/L  6    Glucose      70 - 99 mg/dL  91    Urea Nitrogen      7 - 30 mg/dL  21    Creatinine      0.52 - 1.04 mg/dL  1.12 (H)    GFR Estimate      >60 mL/min/1.7m2  47 (L)    GFR Estimate If Black      >60 mL/min/1.7m2  57 (L)    Calcium      8.5 - 10.1 mg/dL  8.7    Bilirubin Total      0.2 - 1.3 mg/dL  0.4    Albumin      3.4 - 5.0 g/dL  3.4    Protein Total      6.8 - 8.8 g/dL  7.5    Alkaline Phosphatase      40 - 150 U/L  47    ALT      0 - 50 U/L  23    AST      0 - 45 U/L  12    WBC      4.0 - 11.0 10e9/L  11.1 (H) 8.6   RBC Count      3.8 - 5.2 10e12/L  3.24 (L) 3.31 (L)   Hemoglobin      11.7 - 15.7 g/dL  10.6 (L) 10.9 (L)   Hematocrit      35.0 - 47.0 %  33.3 (L) 34.4 (L)   MCV      78 - 100 fl  103 (H) 104 (H)   MCH      26.5 - 33.0 pg  32.7 32.9   MCHC      31.5 - 36.5 g/dL  31.8 31.7   RDW      10.0 - 15.0 %  13.9 14.1   Platelet Count      150 - 450 10e9/L  181 156   Immunofixation ELP            IGG      080 - 3427  mg/dL      IGA      70 - 380 mg/dL      IGM      60 - 265 mg/dL      Kappa Free Lt Chain      0.33 - 1.94 mg/dL      Lambda Free Lt Chain      0.57 - 2.63 mg/dL      Kappa Lambda Ratio      0.26 - 1.65      JACINTO interpretation      NEG:Negative      JACINTO pattern 1            JACINTO titer 1            JACINTO Screen by EIA      <1.0 1.5 (H)     Hemoglobin A1C      4.3 - 6.0 %      Histone Antibody,IgG      0.0 - 0.9 Units      Melva 1 Antibody IgG      0.0 - 0.9 AI      Garcia DARIUSZ Antibody IgG      0.0 - 0.9 AI      RNP Antibody IgG      0.0 - 0.9 AI      SSB (La) (DARIUSZ) Antibody, IgG      0.0 - 0.9 AI      SSA (Ro) (DARIUSZ) Antibody, IgG      0.0 - 0.9 AI      SSDNA Antibody IgG      0 - 19 EU      DNA-ds      <10 IU/mL      Scleroderma Antibody Scl-70 DARIUSZ IgG      0.0 - 0.9 AI      Immunofix ELP Urine              Component      Latest Ref Rng & Units 8/29/2017   Alpha 2 Fraction      0.5 - 0.9 g/dL 0.9   Beta Fraction      0.6 - 1.0 g/dL 0.9   Gamma Fraction      0.7 - 1.6 g/dL 1.5   Monoclonal Peak      0.0 g/dL 0.2 (H)   ELP Interpretation:       Small monoclonal protein (about 0.2 g/dL) seen in the gamma fraction, not previously . . .   Sodium      133 - 144 mmol/L 140   Potassium      3.4 - 5.3 mmol/L 4.5   Chloride      94 - 109 mmol/L 106   Carbon Dioxide      20 - 32 mmol/L 28   Anion Gap      3 - 14 mmol/L 6   Glucose      70 - 99 mg/dL 121 (H)   Urea Nitrogen      7 - 30 mg/dL 20   Creatinine      0.52 - 1.04 mg/dL 1.25 (H)   GFR Estimate      >60 mL/min/1.7m2 42 (L)   GFR Estimate If Black      >60 mL/min/1.7m2 50 (L)   Calcium      8.5 - 10.1 mg/dL 9.0   Bilirubin Total      0.2 - 1.3 mg/dL    Albumin      3.4 - 5.0 g/dL    Protein Total      6.8 - 8.8 g/dL    Alkaline Phosphatase      40 - 150 U/L    ALT      0 - 50 U/L    AST      0 - 45 U/L    WBC      4.0 - 11.0 10e9/L 9.1   RBC Count      3.8 - 5.2 10e12/L 3.28 (L)   Hemoglobin      11.7 - 15.7 g/dL 10.9 (L)   Hematocrit      35.0 - 47.0 % 34.7 (L)   MCV      78 -  100 fl 106 (H)   MCH      26.5 - 33.0 pg 33.2 (H)   MCHC      31.5 - 36.5 g/dL 31.4 (L)   RDW      10.0 - 15.0 % 13.4   Platelet Count      150 - 450 10e9/L 150   Immunofixation ELP          IGG      695 - 1620 mg/dL    IGA      70 - 380 mg/dL    IGM      60 - 265 mg/dL    Kappa Free Lt Chain      0.33 - 1.94 mg/dL    Lambda Free Lt Chain      0.57 - 2.63 mg/dL    Kappa Lambda Ratio      0.26 - 1.65    JACINTO interpretation      NEG:Negative    JACINTO pattern 1          JACINTO titer 1          JACINTO Screen by EIA      <1.0 1.7 (H)   Hemoglobin A1C      4.3 - 6.0 % 6.3 (H)   Histone Antibody,IgG      0.0 - 0.9 Units    Melva 1 Antibody IgG      0.0 - 0.9 AI    Garcia DARIUSZ Antibody IgG      0.0 - 0.9 AI    RNP Antibody IgG      0.0 - 0.9 AI    SSB (La) (DARIUSZ) Antibody, IgG      0.0 - 0.9 AI    SSA (Ro) (DARIUSZ) Antibody, IgG      0.0 - 0.9 AI    SSDNA Antibody IgG      0 - 19 EU    DNA-ds      <10 IU/mL    Scleroderma Antibody Scl-70 DARIUSZ IgG      0.0 - 0.9 AI    Immunofix ELP Urine            Component      Latest Ref Rng & Units 12/20/2017   Alpha 2 Fraction      0.5 - 0.9 g/dL    Beta Fraction      0.6 - 1.0 g/dL    Gamma Fraction      0.7 - 1.6 g/dL    Monoclonal Peak      0.0 g/dL    ELP Interpretation:          Sodium      133 - 144 mmol/L 143   Potassium      3.4 - 5.3 mmol/L 5.0   Chloride      94 - 109 mmol/L 111 (H)   Carbon Dioxide      20 - 32 mmol/L 26   Anion Gap      3 - 14 mmol/L 6   Glucose      70 - 99 mg/dL 108 (H)   Urea Nitrogen      7 - 30 mg/dL 22   Creatinine      0.52 - 1.04 mg/dL 1.18 (H)   GFR Estimate      >60 mL/min/1.7m2 44 (L)   GFR Estimate If Black      >60 mL/min/1.7m2 54 (L)   Calcium      8.5 - 10.1 mg/dL 8.9   Bilirubin Total      0.2 - 1.3 mg/dL    Albumin      3.4 - 5.0 g/dL    Protein Total      6.8 - 8.8 g/dL    Alkaline Phosphatase      40 - 150 U/L    ALT      0 - 50 U/L    AST      0 - 45 U/L    WBC      4.0 - 11.0 10e9/L 10.3   RBC Count      3.8 - 5.2 10e12/L 3.23 (L)   Hemoglobin      11.7 -  15.7 g/dL 10.7 (L)   Hematocrit      35.0 - 47.0 % 34.1 (L)   MCV      78 - 100 fl 106 (H)   MCH      26.5 - 33.0 pg 33.1 (H)   MCHC      31.5 - 36.5 g/dL 31.4 (L)   RDW      10.0 - 15.0 % 13.2   Platelet Count      150 - 450 10e9/L 147 (L)   Immunofixation ELP       (Note)   IGG      695 - 1620 mg/dL 1400   IGA      70 - 380 mg/dL 274   IGM      60 - 265 mg/dL 208   [place] Free Lt Chain      0.33 - 1.94 mg/dL 3.64 (H)   Lambda Free Lt Chain      0.57 - 2.63 mg/dL 5.15 (H)   Kappa Lambda Ratio      0.26 - 1.65 0.71   JACINTO interpretation      NEG:Negative Positive (A)   JACINTO pattern 1       SPECKLED   JACINTO titer 1       1:640   JACINTO Screen by EIA      <1.0    Hemoglobin A1C      4.3 - 6.0 %    Histone Antibody,IgG      0.0 - 0.9 Units 0.4   Melva 1 Antibody IgG      0.0 - 0.9 AI <0.2   Garcia DARIUSZ Antibody IgG      0.0 - 0.9 AI <0.2   RNP Antibody IgG      0.0 - 0.9 AI 2.0 (H)   SSB (La) (DARIUSZ) Antibody, IgG      0.0 - 0.9 AI <0.2   SSA (Ro) (DARIUSZ) Antibody, IgG      0.0 - 0.9 AI <0.2   SSDNA Antibody IgG      0 - 19 EU 15   DNA-ds      <10 IU/mL 2   Scleroderma Antibody Scl-70 DARIUSZ IgG      0.0 - 0.9 AI <0.2   Immunofix ELP Urine       (Note)        Lab Results   Component Value Date     12/20/2017     Lab Results   Component Value Date    A1C 6.3 08/29/2017     Lab Results   Component Value Date    CHOL 158 02/03/2017     Lab Results   Component Value Date    LDL 85 02/03/2017     Lab Results   Component Value Date    HDL 50 02/03/2017     Lab Results   Component Value Date    TRIG 117 02/03/2017     Lab Results   Component Value Date    CR 1.18 12/20/2017     Lab Results   Component Value Date    ALT 23 04/18/2016     Lab Results   Component Value Date    AST 12 04/18/2016     Lab Results   Component Value Date    MICROL 20 02/03/2017     Lab Results   Component Value Date    TSH 3.61 08/29/2017       Labs reviewed with pt. Stable anemia. Not checking sugars. Moderate compliance with DM diet. Chronic bilateral knee  "pain. HAs seen [facility] in past. Some limitation with ambulation. Doesn't want  To see ortho know though as has been told would need TKA and doesn't want surgery. Not candidate for NSAIDS with CKD and declines narcotic pain med. Using Tylenol occ. HAs some general daytime fatigue. Some snoring. Has been dx'd in past with GARTH at some point but no other info in EMR to review.  Not on CPAP. Denies depression. Mild chronic SOB. NO chest pain or abd pain. Other joints besides knees OK. Hx mlid elevated. JACINTO. Hx Mild SPEP elevation and light chains elevated. Pt declines eval with Hematology and BM bx at this time. Mildly low plts. No bruising. Refused to be weighed today. Hx morbid obesity    Additional ROS:   Constitutional, HEENT, Cardiovascular, Pulmonary, GI and , Neuro, MSK and Psych review of systems/symptoms are otherwise negative or unchanged from previous, except as noted above.      OBJECTIVE:  /78  Pulse 58  Temp 97.8  F (36.6  C) (Oral)  LMP 06/16/1985 (Approximate)  SpO2 100%   Estimated body mass index is 45.49 kg/(m^2) as calculated from the following:    Height as of 9/1/17: 5' 4\" (1.626 m).    Weight as of 9/1/17: 265 lb (120.2 kg).  Eye: PERRL, EOMI  HENT: ear canals and TM's normal and nose and mouth without ulcers or lesions. Narrowed airway due to obesity  Neck: no adenopathy. Thyroid normal to palpation. No bruits  Pulm: Lungs clear to auscultation   CV: Regular rates and rhythm  GI: Soft, obese, nontender, Normal active bowel sounds, No hepatosplenomegaly or masses palpable  Ext: Peripheral pulses intact. MIld chronic BLE edema. Bilateral knee joint line tenderness to palpation. No effusion  Neuro: Normal strength and tone, sensory exam grossly normal    Assessment/Plan: (See plan discussion below for further details)  1. Morbid obesity, unspecified obesity type (H)  Contributing to bilateral knee pain and possible GARTH. See plan discussion below re: counselling re: diet. Any walking or other " exercise as able    2. Type 2 diabetes mellitus with stage 3 chronic kidney disease, without long-term current use of insulin (H)  Not checking sugats. Controlled as of last summer. Future labs as ordered. Diet as below  - Hemoglobin A1c; Future  - Comprehensive metabolic panel; Future  - Lipid panel reflex to direct LDL Fasting; Future  - Albumin Random Urine Quantitative with Creat Ratio; Future    3. Thrombocytopenia (H)  NO bruising. Minimal drop. Future lab as ordered  - CBC with platelets; Future    4. Light chain disease (H)   Pt declines hematology consult. Future lab for monitoring  - Kappa and lambda light chain; Future  - CBC with platelets; Future    5. Anemia, unspecified type  Stable. Future lab as ordered. Likely related to  CKD, Light chain disease, etc. Denies seeing blood in stools  - CBC with platelets; Future    6. MGUS (monoclonal gammopathy of unknown significance)   Pt declines hematology consult. Future lab for monitoring  - Kappa and lambda light chain; Future  - CBC with platelets; Future  - Protein electrophoresis; Future    7. CKD (chronic kidney disease) stage 3, GFR 30-59 ml/min  stable  - Comprehensive metabolic panel; Future    8. Elevated antinuclear antibody (JACINTO) level   Isolated RNP. ? MCTD. Pt declines Rheum consult at this time. Future labs as ordered  - Anti Nuclear [place] IgG by IFA with Reflex; Future  - CRP inflammation; Future    9. Sleep apnea, unspecified type  See plan discussion below.     10. B12 deficiency  Pt accidentally had been taking Niacin rather than B12. Will start B12 as recommended and stop Niacin    11. Hypothyroidism, unspecified type   continue med. Future lab as ordered  - TSH with free T4 reflex; Future    12. Chronic pain of both knees  Pt to see ortho when willing to consider possible injection. Declines TKA and stronger pain meds    13. Other fatigue  Multifactorial. Await GARTH assessment screen as below         Plan discussion:  Have someone  observe  your sleep for 5-10 minutes to see if  you are having sleep apnea/stopping of breathing/holdine your breath contest. Call update to nurseline [phone]. If sleep apnea, will refer to the  [facility]  Tylenol ES (500mg tabs), 1-2 tabs up to 3 times a day as needed for pain. Max dose in 24 hrs is 3,000mg.  Ice the knees as needed  If knee pains persists, then see  [facility] for possible knee injection.  Sites include [facility] [phone] or [place] [phone]. Call for appointment.   Fasting labs in 1 month. Will send letter. For fasting labs, please refrain from eating for 8 hours or more.  Be sure to  drink water and take your  medications the day of the test.   Repeat nonfasting kidney/bone marrow labs in mid July. Would not intervene with bone marrow biopsy at this time with stability.  Continue current medicaitons. WIll do refills on file at pharmacy when needed  Stop Vitamin B3 (Niacin)   Start Vitamin b12, 1,000 mcg tab daily         Aquilino Lilly MD  Internal Medicine Department  [facility]

## 2018-01-28 PROBLEM — E53.8 B12 DEFICIENCY: Status: ACTIVE | Noted: 2018-01-28

## 2018-01-28 PROBLEM — D69.6 THROMBOCYTOPENIA (H): Status: ACTIVE | Noted: 2018-01-28

## 2018-02-08 DIAGNOSIS — I10 ESSENTIAL HYPERTENSION, BENIGN: ICD-10-CM

## 2018-02-08 NOTE — TELEPHONE ENCOUNTER
"Requested Prescriptions   Pending Prescriptions Disp Refills     lisinopril (PRINIVIL/ZESTRIL) 40 MG tablet [Pharmacy Med Name: LISINOPRIL 40MG TABLETS]  Last Written Prescription Date:  8/08/2017  Last Fill Quantity: 90,  # refills: 1   Last Office Visit with FM provider:  1/19/2018   Future Office Visit:      90 tablet 0     Sig: TAKE 1 TABLET(40 MG) BY MOUTH DAILY    ACE Inhibitors (Including Combos) Protocol Failed    2/8/2018 10:35 AM       Failed - Blood pressure under 140/90    BP Readings from Last 3 Encounters:   01/19/18 140/78   09/01/17 142/63   08/29/17 122/74                Failed - Normal serum creatinine on file in past 12 months    Recent Labs   Lab Test  12/20/17   0832   CR  1.18*            Passed - Recent or future visit with authorizing provider's specialty    Patient had office visit in the last year or has a visit in the next 30 days with authorizing provider.  See \"Patient Info\" tab in inbasket, or \"Choose Columns\" in Meds & Orders section of the refill encounter.             .rxde Passed - Patient is age 18 or older       Passed - No active pregnancy on record       Passed - Normal serum potassium on file in past 12 months    Recent Labs   Lab Test  12/20/17   0832   POTASSIUM  5.0            Passed - No positive pregnancy test in past 12 months          "

## 2018-02-09 RX ORDER — LISINOPRIL 40 MG/1
TABLET ORAL
Qty: 90 TABLET | Refills: 3 | Status: SHIPPED | OUTPATIENT
Start: 2018-02-09 | End: 2019-02-02

## 2018-02-20 DIAGNOSIS — F32.1 MAJOR DEPRESSIVE DISORDER, SINGLE EPISODE, MODERATE (H): ICD-10-CM

## 2018-02-21 ENCOUNTER — TRANSFERRED RECORDS (OUTPATIENT)
Dept: HEALTH INFORMATION MANAGEMENT | Facility: CLINIC | Age: 78
End: 2018-02-21

## 2018-02-21 RX ORDER — CITALOPRAM HYDROBROMIDE 20 MG/1
TABLET ORAL
Qty: 90 TABLET | Refills: 0 | Status: SHIPPED | OUTPATIENT
Start: 2018-02-21 | End: 2018-05-18

## 2018-02-21 NOTE — TELEPHONE ENCOUNTER
"Requested Prescriptions   Pending Prescriptions Disp Refills     citalopram (CELEXA) 20 MG tablet [Pharmacy Med Name: CITALOPRAM 20MG TABLETS] 90 tablet 0     Sig: TAKE 1 TABLET(20 MG) BY MOUTH DAILY    SSRIs Protocol Passed    2/20/2018  5:22 PM       Passed - PHQ-9 score less than 5 in past 6 months    The PHQ-9 criteria is meant to fail. It requires a PHQ-9 score review         Passed - Patient is age 18 or older       Passed - No active pregnancy on record       Passed - No positive pregnancy test in last 12 months       Passed - Recent (6 mo) or future visit with authorizing provider's specialty    Patient had office visit in the last 6 months or has a visit in the next 30 days with authorizing provider.  See \"Patient Info\" tab in inbasket, or \"Choose Columns\" in Meds & Orders section of the refill encounter.            PHQ-9 SCORE 9/22/2016 5/26/2017 11/15/2017   Total Score - - -   Total Score 2 7 3     Prescription approved per [facility].    "

## 2018-03-07 DIAGNOSIS — E78.5 HYPERLIPIDEMIA LDL GOAL <100: ICD-10-CM

## 2018-03-07 RX ORDER — PRAVASTATIN SODIUM 40 MG
TABLET ORAL
Qty: 90 TABLET | Refills: 0 | OUTPATIENT
Start: 2018-03-07

## 2018-03-07 RX ORDER — PRAVASTATIN SODIUM 40 MG
TABLET ORAL
Qty: 30 TABLET | Refills: 0 | Status: SHIPPED | OUTPATIENT
Start: 2018-03-07 | End: 2018-04-05

## 2018-03-07 NOTE — LETTER
[facility]  [address] [id number] [phone]            Mahogany Blanco  [address]  [place] [address]        March 7, 2018    Dear Mahogany,    While refilling your prescription today, we noticed that you are due to have labs drawn.  We will refill your prescription for 30 days, but a follow-up appointment must be made before any additional refills can be approved.     Taking care of your health is important to us and we look forward to seeing you in the near future.  Please call us at [phone] or [phone] (or use [employer]) to schedule an appointment.     Please disregard this notice if you have already made an appointment.    Sincerely,        [facility]

## 2018-03-07 NOTE — TELEPHONE ENCOUNTER
"Requested Prescriptions   Pending Prescriptions Disp Refills     pravastatin (PRAVACHOL) 40 MG tablet [Pharmacy Med Name: PRAVASTATIN 40MG TABLETS] 90 tablet 0     Sig: TAKE 1 TABLET(40 MG) BY MOUTH DAILY AT BEDTIME    Statins Protocol Failed    3/7/2018 10:36 AM       Failed - LDL on file in past 12 months    Recent Labs   Lab Test  02/03/17   0903   LDL  85            Passed - No abnormal creatine kinase in past 12 months    No lab results found.         Passed - Recent (12 mo) or future (30 days) visit within the authorizing provider's specialty    Patient had office visit in the last year or has a visit in the next 30 days with authorizing provider.  See \"Patient Info\" tab in inbasket, or \"Choose Columns\" in Meds & Orders section of the refill encounter.            Passed - Patient is age 18 or older       Passed - No active pregnancy on record       Passed - No positive pregnancy test in past 12 months        Medication is being filled for 1 time refill only due to:  Patient needs labs lipids.    "

## 2018-04-05 DIAGNOSIS — E78.5 HYPERLIPIDEMIA LDL GOAL <100: ICD-10-CM

## 2018-04-06 RX ORDER — PRAVASTATIN SODIUM 40 MG
TABLET ORAL
Qty: 30 TABLET | Refills: 0 | Status: SHIPPED | OUTPATIENT
Start: 2018-04-06 | End: 2018-05-05

## 2018-04-06 NOTE — TELEPHONE ENCOUNTER
Medication is being filled for 1 time refill only due to:  Patient needs labs fasting lab appt scheduled for Monday 4/9 at 9:30am..

## 2018-04-06 NOTE — TELEPHONE ENCOUNTER
"Requested Prescriptions   Pending Prescriptions Disp Refills     pravastatin (PRAVACHOL) 40 MG tablet [Pharmacy Med Name: PRAVASTATIN 40MG TABLETS]  Last Written Prescription Date:  3/07/2018  Last Fill Quantity: 30,  # refills: 0   Last office visit: 1/19/2018 with prescribing provider:  1/19/2018   Future Office Visit:     30 tablet 0     Sig: TAKE 1 TABLET BY MOUTH DAILY AT BEDTIME    Statins Protocol Failed    4/5/2018  5:23 PM       Failed - LDL on file in past 12 months    Recent Labs   Lab Test  02/03/17   0903   LDL  85            Passed - No abnormal creatine kinase in past 12 months    No lab results found.            Passed - Recent (12 mo) or future (30 days) visit within the authorizing provider's specialty    Patient had office visit in the last 12 months or has a visit in the next 30 days with authorizing provider or within the authorizing provider's specialty.  See \"Patient Info\" tab in inbasket, or \"Choose Columns\" in Meds & Orders section of the refill encounter.           Passed - Patient is age 18 or older       Passed - No active pregnancy on record       Passed - No positive pregnancy test in past 12 months          "

## 2018-04-09 DIAGNOSIS — N18.30 CKD (CHRONIC KIDNEY DISEASE) STAGE 3, GFR 30-59 ML/MIN (H): ICD-10-CM

## 2018-04-09 DIAGNOSIS — N18.30 TYPE 2 DIABETES MELLITUS WITH STAGE 3 CHRONIC KIDNEY DISEASE, WITHOUT LONG-TERM CURRENT USE OF INSULIN (H): ICD-10-CM

## 2018-04-09 DIAGNOSIS — E55.9 VITAMIN D DEFICIENCY: ICD-10-CM

## 2018-04-09 DIAGNOSIS — E11.22 TYPE 2 DIABETES MELLITUS WITH STAGE 3 CHRONIC KIDNEY DISEASE, WITHOUT LONG-TERM CURRENT USE OF INSULIN (H): ICD-10-CM

## 2018-04-09 LAB
ALBUMIN SERPL-MCNC: 3.5 G/DL (ref 3.4–5)
ALP SERPL-CCNC: 43 U/L (ref 40–150)
ALT SERPL W P-5'-P-CCNC: 11 U/L (ref 0–50)
ANION GAP SERPL CALCULATED.3IONS-SCNC: 5 MMOL/L (ref 3–14)
AST SERPL W P-5'-P-CCNC: 11 U/L (ref 0–45)
BILIRUB SERPL-MCNC: 0.3 MG/DL (ref 0.2–1.3)
BUN SERPL-MCNC: 17 MG/DL (ref 7–30)
CALCIUM SERPL-MCNC: 8.8 MG/DL (ref 8.5–10.1)
CHLORIDE SERPL-SCNC: 107 MMOL/L (ref 94–109)
CHOLEST SERPL-MCNC: 144 MG/DL
CO2 SERPL-SCNC: 29 MMOL/L (ref 20–32)
CREAT SERPL-MCNC: 1.32 MG/DL (ref 0.52–1.04)
CREAT UR-MCNC: 143 MG/DL
GFR SERPL CREATININE-BSD FRML MDRD: 39 ML/MIN/1.7M2
GLUCOSE SERPL-MCNC: 111 MG/DL (ref 70–99)
HBA1C MFR BLD: 6.3 % (ref 0–6.4)
HDLC SERPL-MCNC: 46 MG/DL
LDLC SERPL CALC-MCNC: 77 MG/DL
MICROALBUMIN UR-MCNC: 18 MG/L
MICROALBUMIN/CREAT UR: 12.8 MG/G CR (ref 0–25)
NONHDLC SERPL-MCNC: 98 MG/DL
POTASSIUM SERPL-SCNC: 4.9 MMOL/L (ref 3.4–5.3)
PROT SERPL-MCNC: 7.6 G/DL (ref 6.8–8.8)
SODIUM SERPL-SCNC: 141 MMOL/L (ref 133–144)
TRIGL SERPL-MCNC: 105 MG/DL

## 2018-04-09 PROCEDURE — 83036 HEMOGLOBIN GLYCOSYLATED A1C: CPT | Performed by: INTERNAL MEDICINE

## 2018-04-09 PROCEDURE — 36415 COLL VENOUS BLD VENIPUNCTURE: CPT | Performed by: INTERNAL MEDICINE

## 2018-04-09 PROCEDURE — 80053 COMPREHEN METABOLIC PANEL: CPT | Performed by: INTERNAL MEDICINE

## 2018-04-09 PROCEDURE — 82306 VITAMIN D 25 HYDROXY: CPT | Performed by: INTERNAL MEDICINE

## 2018-04-09 PROCEDURE — 80061 LIPID PANEL: CPT | Performed by: INTERNAL MEDICINE

## 2018-04-09 PROCEDURE — 82043 UR ALBUMIN QUANTITATIVE: CPT | Performed by: INTERNAL MEDICINE

## 2018-04-10 LAB — DEPRECATED CALCIDIOL+CALCIFEROL SERPL-MC: 19 UG/L (ref 20–75)

## 2018-04-12 DIAGNOSIS — K21.9 GASTROESOPHAGEAL REFLUX DISEASE, ESOPHAGITIS PRESENCE NOT SPECIFIED: ICD-10-CM

## 2018-04-12 NOTE — TELEPHONE ENCOUNTER
"Requested Prescriptions   Pending Prescriptions Disp Refills     omeprazole (PRILOSEC) 20 MG CR capsule [Pharmacy Med Name: OMEPRAZOLE 20MG CAPSULES]  Last Written Prescription Date:  7/11/2017  Last Fill Quantity: 90,  # refills: 2   Last office visit: 1/19/2018 with prescribing provider:  Aquilino Lilly   Future Office Visit:     90 capsule 0     Sig: TAKE 1 CAPSULE(20 MG) BY MOUTH DAILY    PPI Protocol Passed    4/12/2018 12:48 PM       Passed - Not on Clopidogrel (unless Pantoprazole ordered)       Passed - No diagnosis of osteoporosis on record       Passed - Recent (12 mo) or future (30 days) visit within the authorizing provider's specialty    Patient had office visit in the last 12 months or has a visit in the next 30 days with authorizing provider or within the authorizing provider's specialty.  See \"Patient Info\" tab in inbasket, or \"Choose Columns\" in Meds & Orders section of the refill encounter.           Passed - Patient is age 18 or older       Passed - No active pregnacy on record       Passed - No positive pregnancy test in past 12 months          "

## 2018-05-05 DIAGNOSIS — E78.5 HYPERLIPIDEMIA LDL GOAL <100: ICD-10-CM

## 2018-05-08 RX ORDER — PRAVASTATIN SODIUM 40 MG
TABLET ORAL
Qty: 30 TABLET | Refills: 8 | Status: SHIPPED | OUTPATIENT
Start: 2018-05-08 | End: 2019-03-09

## 2018-05-08 NOTE — TELEPHONE ENCOUNTER
"Requested Prescriptions   Pending Prescriptions Disp Refills     pravastatin (PRAVACHOL) 40 MG tablet [Pharmacy Med Name: PRAVASTATIN 40MG TABLETS] 30 tablet 0     Sig: TAKE 1 TABLET BY MOUTH DAILY AT BEDTIME    Statins Protocol Passed    5/5/2018  3:09 PM       Passed - LDL on file in past 12 months    Recent Labs   Lab Test  04/09/18   0939   LDL  77            Passed - No abnormal creatine kinase in past 12 months    No lab results found.            Passed - Recent (12 mo) or future (30 days) visit within the authorizing provider's specialty    Patient had office visit in the last 12 months or has a visit in the next 30 days with authorizing provider or within the authorizing provider's specialty.  See \"Patient Info\" tab in inbasket, or \"Choose Columns\" in Meds & Orders section of the refill encounter.           Passed - Patient is age 18 or older       Passed - No active pregnancy on record       Passed - No positive pregnancy test in past 12 months        Prescription approved per [facility] Refill Protocol.    "

## 2018-05-18 DIAGNOSIS — F32.1 MAJOR DEPRESSIVE DISORDER, SINGLE EPISODE, MODERATE (H): ICD-10-CM

## 2018-05-18 NOTE — TELEPHONE ENCOUNTER
"Requested Prescriptions   Pending Prescriptions Disp Refills     citalopram (CELEXA) 20 MG tablet [Pharmacy Med Name: CITALOPRAM 20MG TABLETS]  Last Written Prescription Date:  2/21/2018  Last Fill Quantity: 90,  # refills: 0   Last office visit: 1/19/2018 with prescribing provider:  1/19/2018   Future Office Visit:     90 tablet 0     Sig: TAKE 1 TABLET(20 MG) BY MOUTH DAILY    SSRIs Protocol Failed    5/18/2018 10:24 AM       Failed - PHQ-9 score less than 5 in past 6 months    Please review last PHQ-9 score.          Passed - Patient is age 18 or older       Passed - No active pregnancy on record       Passed - No positive pregnancy test in last 12 months       Passed - Recent (6 mo) or future (30 days) visit within the authorizing provider's specialty    Patient had office visit in the last 6 months or has a visit in the next 30 days with authorizing provider or within the authorizing provider's specialty.  See \"Patient Info\" tab in inbasket, or \"Choose Columns\" in Meds & Orders section of the refill encounter.            PHQ-9 SCORE 9/22/2016 5/26/2017 11/15/2017   Total Score - - -   Total Score 2 7 3         "

## 2018-05-21 DIAGNOSIS — F32.1 MAJOR DEPRESSIVE DISORDER, SINGLE EPISODE, MODERATE (H): ICD-10-CM

## 2018-05-21 RX ORDER — CITALOPRAM HYDROBROMIDE 20 MG/1
TABLET ORAL
Qty: 30 TABLET | Refills: 0 | Status: SHIPPED | OUTPATIENT
Start: 2018-05-21 | End: 2018-06-19

## 2018-05-21 RX ORDER — CITALOPRAM HYDROBROMIDE 20 MG/1
TABLET ORAL
Qty: 90 TABLET | Refills: 0 | OUTPATIENT
Start: 2018-05-21

## 2018-06-19 DIAGNOSIS — F32.1 MAJOR DEPRESSIVE DISORDER, SINGLE EPISODE, MODERATE (H): ICD-10-CM

## 2018-06-19 RX ORDER — CITALOPRAM HYDROBROMIDE 20 MG/1
TABLET ORAL
Qty: 30 TABLET | Refills: 0 | Status: SHIPPED | OUTPATIENT
Start: 2018-06-19 | End: 2018-07-18

## 2018-06-19 NOTE — TELEPHONE ENCOUNTER
"Requested Prescriptions   Pending Prescriptions Disp Refills     citalopram (CELEXA) 20 MG tablet [Pharmacy Med Name: CITALOPRAM 20MG TABLETS] 30 tablet 0     Sig: TAKE 1 TABLET(20 MG) BY MOUTH DAILY    SSRIs Protocol Failed    6/19/2018 10:34 AM       Failed - PHQ-9 score less than 5 in past 6 months    Please review last PHQ-9 score.          Passed - Patient is age 18 or older       Passed - No active pregnancy on record       Passed - No positive pregnancy test in last 12 months       Passed - Recent (6 mo) or future (30 days) visit within the authorizing provider's specialty    Patient had office visit in the last 6 months or has a visit in the next 30 days with authorizing provider or within the authorizing provider's specialty.  See \"Patient Info\" tab in inbasket, or \"Choose Columns\" in Meds & Orders section of the refill encounter.            Last Written Prescription Date:  5/21/18  Last Fill Quantity: 30,  # refills: 0   Last office visit: 1/19/2018 with prescribing provider:  PCP   Future Office Visit:      PHQ-9 SCORE 9/22/2016 5/26/2017 11/15/2017   Total Score - - -   Total Score 2 7 3       "

## 2018-06-19 NOTE — TELEPHONE ENCOUNTER
PHQ-9 done over the phone with patient.    PHQ-9 SCORE 5/26/2017 11/15/2017 6/19/2018   Total Score - - -   Total Score 7 3 3     Prescription approved per [facility] Refill Protocol.

## 2018-06-20 ASSESSMENT — PATIENT HEALTH QUESTIONNAIRE - PHQ9: SUM OF ALL RESPONSES TO PHQ QUESTIONS 1-9: 3

## 2018-07-18 DIAGNOSIS — F32.1 MAJOR DEPRESSIVE DISORDER, SINGLE EPISODE, MODERATE (H): ICD-10-CM

## 2018-07-19 RX ORDER — CITALOPRAM HYDROBROMIDE 20 MG/1
TABLET ORAL
Qty: 30 TABLET | Refills: 0 | Status: SHIPPED | OUTPATIENT
Start: 2018-07-19 | End: 2018-08-15

## 2018-07-19 NOTE — TELEPHONE ENCOUNTER
"Requested Prescriptions   Pending Prescriptions Disp Refills     citalopram (CELEXA) 20 MG tablet [Pharmacy Med Name: CITALOPRAM 20MG TABLETS] 30 tablet 0     Sig: TAKE 1 TABLET(20 MG) BY MOUTH DAILY    SSRIs Protocol Passed    7/18/2018  5:15 PM       Passed - PHQ-9 score less than 5 in past 6 months    Please review last PHQ-9 score.          Passed - Patient is age 18 or older       Passed - No active pregnancy on record       Passed - No positive pregnancy test in last 12 months       Passed - Recent (6 mo) or future (30 days) visit within the authorizing provider's specialty    Patient had office visit in the last 6 months or has a visit in the next 30 days with authorizing provider or within the authorizing provider's specialty.  See \"Patient Info\" tab in inbasket, or \"Choose Columns\" in Meds & Orders section of the refill encounter.            Last Written Prescription Date:  06/19/2018  Last Fill Quantity: 30,  # refills: 0   Last office visit: 1/19/2018 with prescribing provider:  01/19/2018   Future Office Visit:   Next 5 appointments (look out 90 days)     Jul 23, 2018 11:30 AM CDT   [facility]    [address] [address] [phone]                 PHQ-9 SCORE 5/26/2017 11/15/2017 6/19/2018   Total Score - - -   Total Score 7 3 3     No flowsheet data found.      "

## 2018-07-20 DIAGNOSIS — F32.1 MAJOR DEPRESSIVE DISORDER, SINGLE EPISODE, MODERATE (H): ICD-10-CM

## 2018-07-20 RX ORDER — BUPROPION HYDROCHLORIDE 150 MG/1
TABLET ORAL
Qty: 30 TABLET | Refills: 0 | Status: SHIPPED | OUTPATIENT
Start: 2018-07-20 | End: 2018-08-20

## 2018-07-20 RX ORDER — BUPROPION HYDROCHLORIDE 150 MG/1
TABLET ORAL
Qty: 90 TABLET | Refills: 0 | OUTPATIENT
Start: 2018-07-20

## 2018-07-20 NOTE — TELEPHONE ENCOUNTER
"Requested Prescriptions   Pending Prescriptions Disp Refills     buPROPion (WELLBUTRIN XL) 150 MG 24 hr tablet [Pharmacy Med Name: BUPROPION XL 150MG TABLETS (24 H)] 90 tablet 0     Sig: TAKE 1 TABLET(150 MG) BY MOUTH EVERY MORNING    SSRIs Protocol Passed    7/20/2018 10:33 AM       Passed - PHQ-9 score less than 5 in past 6 months    Please review last PHQ-9 score.          Passed - Medication is Bupropion    If the medication is Bupropion (Wellbutrin), and the patient is taking for smoking cessation; OK to refill.         Passed - Patient is age 18 or older       Passed - No active pregnancy on record       Passed - No positive pregnancy test in last 12 months       Passed - Recent (6 mo) or future (30 days) visit within the authorizing provider's specialty    Patient had office visit in the last 6 months or has a visit in the next 30 days with authorizing provider or within the authorizing provider's specialty.  See \"Patient Info\" tab in inbasket, or \"Choose Columns\" in Meds & Orders section of the refill encounter.            Last Written Prescription Date:  11/28/2017  Last Fill Quantity: 90,  # refills: 1   Last office visit: 1/19/2018 with prescribing provider:  1/19/2018   Future Office Visit:   Next 5 appointments (look out 90 days)     Jul 23, 2018 11:30 AM CDT   Office Visit with Aquilino Lilly MD   [facility]    [address] [address] [phone]                   "

## 2018-07-20 NOTE — TELEPHONE ENCOUNTER
Duplicate for buPROPion (WELLBUTRIN XL) 150 MG 24 hr tablet.  RX  Only filled for 30 days (cannot be 90 days) because pt is due for appt, and future appt pending for Monday 7/23.  PCP can fill RX on Monday as needed.

## 2018-07-20 NOTE — TELEPHONE ENCOUNTER
RX filled for 30 days.   Pt has upcoming appt with Dr. Lilly on Monday, 7/23, and can discuss meds/dose/refills at appt.

## 2018-07-23 ENCOUNTER — OFFICE VISIT (OUTPATIENT)
Dept: INTERNAL MEDICINE | Facility: CLINIC | Age: 78
End: 2018-07-23
Payer: COMMERCIAL

## 2018-07-23 DIAGNOSIS — E11.22 TYPE 2 DIABETES MELLITUS WITH STAGE 3 CHRONIC KIDNEY DISEASE, WITHOUT LONG-TERM CURRENT USE OF INSULIN (H): Primary | ICD-10-CM

## 2018-07-23 DIAGNOSIS — D69.6 THROMBOCYTOPENIA (H): ICD-10-CM

## 2018-07-23 DIAGNOSIS — F32.1 MAJOR DEPRESSIVE DISORDER, SINGLE EPISODE, MODERATE (H): ICD-10-CM

## 2018-07-23 DIAGNOSIS — D80.8 LIGHT CHAIN DISEASE (H): ICD-10-CM

## 2018-07-23 DIAGNOSIS — R76.8 ELEVATED ANTINUCLEAR ANTIBODY (ANA) LEVEL: ICD-10-CM

## 2018-07-23 DIAGNOSIS — N18.30 TYPE 2 DIABETES MELLITUS WITH STAGE 3 CHRONIC KIDNEY DISEASE, WITHOUT LONG-TERM CURRENT USE OF INSULIN (H): Primary | ICD-10-CM

## 2018-07-23 DIAGNOSIS — D64.9 ANEMIA, UNSPECIFIED TYPE: ICD-10-CM

## 2018-07-23 DIAGNOSIS — E03.9 HYPOTHYROIDISM, UNSPECIFIED TYPE: ICD-10-CM

## 2018-07-23 LAB
CRP SERPL-MCNC: <2.9 MG/L (ref 0–8)
ERYTHROCYTE [DISTWIDTH] IN BLOOD BY AUTOMATED COUNT: 13.4 % (ref 10–15)
HCT VFR BLD AUTO: 34 % (ref 35–47)
HGB BLD-MCNC: 11 G/DL (ref 11.7–15.7)
IRON SATN MFR SERPL: 27 % (ref 15–46)
IRON SERPL-MCNC: 86 UG/DL (ref 35–180)
MCH RBC QN AUTO: 34.5 PG (ref 26.5–33)
MCHC RBC AUTO-ENTMCNC: 32.4 G/DL (ref 31.5–36.5)
MCV RBC AUTO: 107 FL (ref 78–100)
PLATELET # BLD AUTO: 160 10E9/L (ref 150–450)
RBC # BLD AUTO: 3.19 10E12/L (ref 3.8–5.2)
RETICS # AUTO: 56.9 10E9/L (ref 25–95)
RETICS/RBC NFR AUTO: 1.8 % (ref 0.5–2)
T4 FREE SERPL-MCNC: 0.61 NG/DL (ref 0.76–1.46)
TIBC SERPL-MCNC: 314 UG/DL (ref 240–430)
TSH SERPL DL<=0.005 MIU/L-ACNC: 27.79 MU/L (ref 0.4–4)
VIT B12 SERPL-MCNC: 892 PG/ML (ref 193–986)
WBC # BLD AUTO: 11.4 10E9/L (ref 4–11)

## 2018-07-23 PROCEDURE — 83550 IRON BINDING TEST: CPT | Performed by: INTERNAL MEDICINE

## 2018-07-23 PROCEDURE — 84165 PROTEIN E-PHORESIS SERUM: CPT | Performed by: INTERNAL MEDICINE

## 2018-07-23 PROCEDURE — 84439 ASSAY OF FREE THYROXINE: CPT | Performed by: INTERNAL MEDICINE

## 2018-07-23 PROCEDURE — 84443 ASSAY THYROID STIM HORMONE: CPT | Performed by: INTERNAL MEDICINE

## 2018-07-23 PROCEDURE — 83540 ASSAY OF IRON: CPT | Performed by: INTERNAL MEDICINE

## 2018-07-23 PROCEDURE — 83883 ASSAY NEPHELOMETRY NOT SPEC: CPT | Mod: 59 | Performed by: INTERNAL MEDICINE

## 2018-07-23 PROCEDURE — 85027 COMPLETE CBC AUTOMATED: CPT | Performed by: INTERNAL MEDICINE

## 2018-07-23 PROCEDURE — 86038 ANTINUCLEAR ANTIBODIES: CPT | Performed by: INTERNAL MEDICINE

## 2018-07-23 PROCEDURE — 99214 OFFICE O/P EST MOD 30 MIN: CPT | Performed by: INTERNAL MEDICINE

## 2018-07-23 PROCEDURE — 36415 COLL VENOUS BLD VENIPUNCTURE: CPT | Performed by: INTERNAL MEDICINE

## 2018-07-23 PROCEDURE — 86039 ANTINUCLEAR ANTIBODIES (ANA): CPT | Performed by: INTERNAL MEDICINE

## 2018-07-23 PROCEDURE — 82607 VITAMIN B-12: CPT | Performed by: INTERNAL MEDICINE

## 2018-07-23 PROCEDURE — 83883 ASSAY NEPHELOMETRY NOT SPEC: CPT | Performed by: INTERNAL MEDICINE

## 2018-07-23 PROCEDURE — 85045 AUTOMATED RETICULOCYTE COUNT: CPT | Performed by: INTERNAL MEDICINE

## 2018-07-23 PROCEDURE — 00000402 ZZHCL STATISTIC TOTAL PROTEIN: Performed by: INTERNAL MEDICINE

## 2018-07-23 PROCEDURE — 86140 C-REACTIVE PROTEIN: CPT | Performed by: INTERNAL MEDICINE

## 2018-07-23 NOTE — PROGRESS NOTES
"  SUBJECTIVE:   Mahogany Blanco is a 78 year old female who presents to clinic today for the following health issues:    Chief Complaint   Patient presents with     Diabetes           Pt's past medical history, family history, habits, medications and allergies were reviewed with the patient today.  See snap shot for  HCM status. Most recent lab results reviewed with pt. Problem list and histories reviewed & adjusted, as indicated.  Additional history as below:     Diabetes under good control as below.  Mild chronic shortness of breath.  Denies chest pain or abdominal pain.  History MGUS and due for f/u labs. Denies recent bruising issues with history of thrombocytopenia.   Has some chronic arthralgias history mildly elevated and a nuclear antibody level and needs lab recheck.    Patient has been watching her diet and weight is down 21 pounds compared to September 2017  Mood good with use of Citalopram. Most recent PHQ from 6/19/18 = 3.   .    Lab Results   Component Value Date    A1C 6.3 04/09/2018     Lab Results   Component Value Date    CHOL 144 04/09/2018     Lab Results   Component Value Date    LDL 77 04/09/2018     Lab Results   Component Value Date    HDL 46 04/09/2018     Lab Results   Component Value Date    TRIG 105 04/09/2018        Lab Results   Component Value Date    ALT 15 08/20/2018     Lab Results   Component Value Date    AST 14 08/20/2018     Lab Results   Component Value Date    MICROL 18 04/09/2018         Additional ROS:   Constitutional, HEENT, Cardiovascular, Pulmonary, GI and , Neuro, MSK and Psych review of systems/symptoms are otherwise negative or unchanged from previous, except as noted above.      OBJECTIVE:  /72  Pulse 64  Wt 244 lb (110.7 kg)  LMP 06/16/1985 (Approximate)  SpO2 97%  BMI 41.88 kg/m2   Estimated body mass index is 41.88 kg/(m^2) as calculated from the following:    Height as of 9/1/17: 5' 4\" (1.626 m).    Weight as of this encounter: 244 lb (110.7 kg).   "   Neck: no adenopathy. Thyroid normal to palpation. No bruits  Pulm: Lungs clear to auscultation   CV: Regular rates and rhythm  GI: Soft, obese, nontender, Normal active bowel sounds, No hepatosplenomegaly or masses palpable  Ext: Peripheral pulses intact.MIld chronic edema. DIP and PIP joints hands with osteoarthritis changes.  Mild tenderness palpation bilateral knee joint line.  No effusions.  Global joint examination without localizing erythema or increased warmth  Neuro: Normal strength and tone, sensory exam grossly normal    Assessment/Plan: (See plan discussion below for further details)    1. Type 2 diabetes mellitus with stage 3 chronic kidney disease, without long-term current use of insulin (H)  Controlled.  Glucometer broken and has not been checking blood sugars recently.  Prescription given for new glucometer and test strips.  Will repeat A1c in October  - blood glucose monitoring (NO BRAND SPECIFIED) test strip; Use to test blood sugars one time daily or as directed  Dispense: 100 strip; Refill: 3  - blood glucose monitoring (NO BRAND SPECIFIED) meter device kit; Use to test blood sugar one  time daily or as directed.  Dispense: 1 kit; Refill: 0    2. Major depressive disorder, single episode, moderate (H)  Controlled with citalopram.  Most recent PHQ = 3.  Continue current medication    3. Light chain disease (H)  Needs lab follow-up  - Kappa and lambda light chain  - CBC with platelets  - Protein electrophoresis    4. Thrombocytopenia (H)  Need lab follow-up.  Denies recent bruising issues  - CBC with platelets    5. Anemia, unspecified type  Likely related to #3.  Rule out other etiologies as below.  Patient denies any blood in her stools  - CBC with platelets  - Iron and iron binding capacity  - Vitamin B12  - Reticulocyte count    6. Elevated antinuclear antibody (JACINTO) level  Most likely not related to lupus or other significant immune process given joint exam and history.  Will recheck labs  however to confirm  - Anti Nuclear Makeda IgG by IFA with Reflex  - CRP inflammation    7. Hypothyroidism, unspecified type  Due for thyroid lab follow-up.  Weight down 21 pounds in 1 year  - TSH with free T4 reflex  - T4 free      Plan discussion:  As above  Patient encouraged to continue further diet and activity as tolerated in addition for further weight loss       Aquilino Lilly MD  Internal Medicine Department  [facility] (formerly [facility])[3]

## 2018-07-23 NOTE — MR AVS SNAPSHOT
"              After Visit Summary   7/23/2018    Mahogany Blanco    MRN: [id number]           Patient Information     Date Of Birth          1940        Visit Information        Provider Department      7/23/2018 11:30 AM Aquilino Lilly MD [facility]        Today's Diagnoses     Type 2 diabetes mellitus with stage 3 chronic kidney disease, without long-term current use of insulin (H)    -  1    Major depressive disorder, single episode, moderate (H)        Light chain disease (H)        Thrombocytopenia (H)        Anemia, unspecified type        Elevated antinuclear antibody (JACINTO) level        Hypothyroidism, unspecified type           Follow-ups after your visit        Who to contact     If you have questions or need follow up information about today's clinic visit or your schedule please contact [facility] directly at [phone].  Normal or non-critical lab and imaging results will be communicated to you by MyChart, letter or phone within 4 business days after the clinic has received the results. If you do not hear from us within 7 days, please contact the clinic through MyChart or phone. If you have a critical or abnormal lab result, we will notify you by phone as soon as possible.  Submit refill requests through [employer] or call your pharmacy and they will forward the refill request to us. Please allow 3 business days for your refill to be completed.          Additional Information About Your Visit        MyChart Information     [employer] lets you send messages to your doctor, view your test results, renew your prescriptions, schedule appointments and more. To sign up, go to [email or web address] . Click on \"Log in\" on the left side of the screen, which will take you to the Welcome page. Then click on \"Sign up Now\" on the right side of the page.     You will be asked to enter the access code listed below, as well as some personal information. Please follow " the directions to create your username and password.     Your access code is: [email or web address]  Expires: 2019  6:00 PM     Your access code will  in 90 days. If you need help or a new code, please call your [place] clinic or [phone].        Care EveryWhere ID     This is your Care EveryWhere ID. This could be used by other organizations to access your [place] medical records  [id number]        Your Vitals Were     Pulse Last Period Pulse Oximetry BMI (Body Mass Index)          64 1985 (Approximate) 97% 41.88 kg/m2         Blood Pressure from Last 3 Encounters:   10/12/18 115/49   18 124/68   18 123/46    Weight from Last 3 Encounters:   10/12/18 260 lb (117.9 kg)   18 248 lb (112.5 kg)   18 244 lb (110.7 kg)              We Performed the Following     Anti Nuclear Makeda IgG by IFA with Reflex     CBC with platelets     CRP inflammation     Iron and iron binding capacity     Kappa and lambda light chain     Protein electrophoresis     Reticulocyte count     T4 free     TSH with free T4 reflex     Vitamin B12          Today's Medication Changes          These changes are accurate as of 18 11:59 PM.  If you have any questions, ask your nurse or doctor.               Start taking these medicines.        Dose/Directions    blood glucose monitoring meter device kit   Commonly known as:  no brand specified   Used for:  Type 2 diabetes mellitus with stage 3 chronic kidney disease, without long-term current use of insulin (H)        Use to test blood sugar one  time daily or as directed.   Quantity:  1 kit   Refills:  0         These medicines have changed or have updated prescriptions.        Dose/Directions    * blood glucose monitoring test strip   Commonly known as:  ACCU-CHEK NOELLE   This may have changed:  Another medication with the same name was added. Make sure you understand how and when to take each.   Used for:  Type 2 diabetes mellitus with stage 3 chronic kidney  disease (H)        Use to test blood sugars 1 time daily or as directed.   Quantity:  50 each   Refills:  11       * blood glucose monitoring test strip   Commonly known as:  no brand specified   This may have changed:  You were already taking a medication with the same name, and this prescription was added. Make sure you understand how and when to take each.   Used for:  Type 2 diabetes mellitus with stage 3 chronic kidney disease, without long-term current use of insulin (H)        Use to test blood sugars one time daily or as directed   Quantity:  100 strip   Refills:  3       * Notice:  This list has 2 medication(s) that are the same as other medications prescribed for you. Read the directions carefully, and ask your doctor or other care provider to review them with you.         Where to get your medicines      Some of these will need a paper prescription and others can be bought over the counter.  Ask your nurse if you have questions.     Bring a paper prescription for each of these medications     blood glucose monitoring meter device kit    blood glucose monitoring test strip                Primary Care Provider Office Phone # Fax #    Aquilino Lilly [phone]       [address] [place] [id number]        Equal Access to Services     EMRE ABERNATHY : Hadii brooke saxena hadasho Sotiffanie, waaxda luqadaha, qaybta kaalmada adevangie, rinku calderon. So [facility] [phone].    ATENCIÓN: Si habla español, tiene a jennings disposición servicios gratuitos de asistencia lingüística. Llame al [phone].    We comply with applicable federal civil rights laws and [place] laws. We do not discriminate on the basis of race, color, national origin, age, disability, sex, sexual orientation, or gender identity.            Thank you!     Thank you for choosing [facility]  for your care. Our goal is always to provide you with excellent care. Hearing back from our  patients is one way we can continue to improve our services. Please take a few minutes to complete the written survey that you may receive in the mail after your visit with us. Thank you!             Your Updated Medication List - Protect others around you: Learn how to safely use, store and throw away your medicines at www.disposemymeds.org.          This list is accurate as of 7/23/18 11:59 PM.  Always use your most recent med list.                   Brand Name Dispense Instructions for use Diagnosis    blood glucose monitoring lancets      Use to test blood sugar once daily or as directed.    Type 2 diabetes, HbA1c goal < 7% (H)       blood glucose monitoring meter device kit    no brand specified    1 kit    Use to test blood sugar one  time daily or as directed.    Type 2 diabetes mellitus with stage 3 chronic kidney disease, without long-term current use of insulin (H)       * blood glucose monitoring test strip    ACCU-CHEK NOELLE    50 each    Use to test blood sugars 1 time daily or as directed.    Type 2 diabetes mellitus with stage 3 chronic kidney disease (H)       * blood glucose monitoring test strip    no brand specified    100 strip    Use to test blood sugars one time daily or as directed    Type 2 diabetes mellitus with stage 3 chronic kidney disease, without long-term current use of insulin (H)       fluticasone 50 MCG/ACT spray    FLONASE    16 g    Spray 1-2 sprays into both nostrils daily    URI (upper respiratory infection)       lisinopril 40 MG tablet    PRINIVIL/ZESTRIL    90 tablet    TAKE 1 TABLET(40 MG) BY MOUTH DAILY    Essential hypertension, benign       omeprazole 20 MG CR capsule    priLOSEC    90 capsule    TAKE 1 CAPSULE(20 MG) BY MOUTH DAILY    Gastroesophageal reflux disease, esophagitis presence not specified       order for DME     1 Device    Disp one glucometer that is covered by insurance with 1 RF. Also disp glucometer strips and lancets to check sugars daily. Disp 100  strips and  Lancets  with prn RFs for 1 year    Type 2 diabetes, HbA1c goal < 7% (H)       order for DME     1 each    Equipment being ordered:   AeroChamber (or similar device for inhaler use)    Bronchospasm       pravastatin 40 MG tablet    PRAVACHOL    30 tablet    TAKE 1 TABLET BY MOUTH DAILY AT BEDTIME    Hyperlipidemia LDL goal <100       vitamin D 2000 units tablet     100 tablet    Take 2,000 Units by mouth daily    Vitamin D deficiency       * Notice:  This list has 2 medication(s) that are the same as other medications prescribed for you. Read the directions carefully, and ask your doctor or other care provider to review them with you.

## 2018-07-24 LAB
ALBUMIN SERPL ELPH-MCNC: 4 G/DL (ref 3.7–5.1)
ALPHA1 GLOB SERPL ELPH-MCNC: 0.3 G/DL (ref 0.2–0.4)
ALPHA2 GLOB SERPL ELPH-MCNC: 0.9 G/DL (ref 0.5–0.9)
ANA PAT SER IF-IMP: ABNORMAL
ANA SER QL IF: POSITIVE
ANA TITR SER IF: ABNORMAL {TITER}
B-GLOBULIN SERPL ELPH-MCNC: 0.9 G/DL (ref 0.6–1)
GAMMA GLOB SERPL ELPH-MCNC: 1.7 G/DL (ref 0.7–1.6)
KAPPA LC UR-MCNC: 5.76 MG/DL (ref 0.33–1.94)
KAPPA LC/LAMBDA SER: 1.1 {RATIO} (ref 0.26–1.65)
LAMBDA LC SERPL-MCNC: 5.26 MG/DL (ref 0.57–2.63)
M PROTEIN SERPL ELPH-MCNC: 0.3 G/DL
PROT PATTERN SERPL ELPH-IMP: ABNORMAL

## 2018-07-26 ENCOUNTER — TELEPHONE (OUTPATIENT)
Dept: NURSING | Facility: CLINIC | Age: 78
End: 2018-07-26

## 2018-07-26 NOTE — TELEPHONE ENCOUNTER
Has been on Wellbutrin/ Buprorpion since 2013 for depression. Sx tjhat are just more recent would not be related to medication  Pt relates weight loss of 21 pounds since January but still 3-5 pounds above previous baseline weight so Buprprion did not both cause a 24 pound weight gain and now a 21 pound weioght loss  Breathing issues are relate to obesity and restriction of diaphragm pushing on lungs, especially when bending over  hearing issue is age related  Pt to continue current Buproprion fore depression hx management

## 2018-07-26 NOTE — TELEPHONE ENCOUNTER
"Patient is on buPROPion (WELLBUTRIN XL) 150 MG 24 hr tablet. Says she read some of the side effects today and has many of those issues- Hearing loss, ringing in ears, shortness of breath, change in weight- has lost weight unintentionally- lost 21 pounds since January, Chest pain, pain around belly button, difficult to have BM just passing gas. Has been on the medicine \"for a long time\". Asked about the chest pain and then she said not really chest pain just shortness of breath- experienced this after making her bed, said she loses control of breathing. Right now has no symptoms. Had a little BM today and says that is about usual for her. Has had these symptoms for about the last month and said talked to Dr. Lilly about all this on Monday. She is just calling now to ask if maybe all her symptoms could be from this medicine?  "

## 2018-08-03 ENCOUNTER — TELEPHONE (OUTPATIENT)
Dept: INTERNAL MEDICINE | Facility: CLINIC | Age: 78
End: 2018-08-03

## 2018-08-03 NOTE — TELEPHONE ENCOUNTER
Reason for call:  Results   Name of test or procedure: lab results  Date of test or procedure: 07/23/2018  Location of test or procedure: [facility]    Additional comments: Please call patient with results    Phone number to reach patient:  Home number on file [phone] (home)    Best Time:  anytime    Can we leave a detailed message on this number?  YES

## 2018-08-15 DIAGNOSIS — F32.1 MAJOR DEPRESSIVE DISORDER, SINGLE EPISODE, MODERATE (H): ICD-10-CM

## 2018-08-15 RX ORDER — CITALOPRAM HYDROBROMIDE 20 MG/1
TABLET ORAL
Qty: 30 TABLET | Refills: 2 | Status: SHIPPED | OUTPATIENT
Start: 2018-08-15 | End: 2018-11-16

## 2018-08-15 NOTE — TELEPHONE ENCOUNTER
"Requested Prescriptions   Pending Prescriptions Disp Refills     citalopram (CELEXA) 20 MG tablet [Pharmacy Med Name: CITALOPRAM 20MG TABLETS] 30 tablet 0     Sig: TAKE 1 TABLET(20 MG) BY MOUTH DAILY    SSRIs Protocol Passed    8/15/2018 10:29 AM       Passed - PHQ-9 score less than 5 in past 6 months    Please review last PHQ-9 score.          Passed - Patient is age 18 or older       Passed - No active pregnancy on record       Passed - No positive pregnancy test in last 12 months       Passed - Recent (6 mo) or future (30 days) visit within the authorizing provider's specialty    Patient had office visit in the last 6 months or has a visit in the next 30 days with authorizing provider or within the authorizing provider's specialty.  See \"Patient Info\" tab in inbasket, or \"Choose Columns\" in Meds & Orders section of the refill encounter.            Prescription approved per [facility] Refill Protocol.    "

## 2018-08-20 ENCOUNTER — APPOINTMENT (OUTPATIENT)
Dept: CT IMAGING | Facility: CLINIC | Age: 78
End: 2018-08-20
Attending: EMERGENCY MEDICINE
Payer: COMMERCIAL

## 2018-08-20 ENCOUNTER — HOSPITAL ENCOUNTER (EMERGENCY)
Facility: CLINIC | Age: 78
Discharge: HOME | End: 2018-08-21
Attending: EMERGENCY MEDICINE | Admitting: EMERGENCY MEDICINE
Payer: COMMERCIAL

## 2018-08-20 DIAGNOSIS — F32.1 MAJOR DEPRESSIVE DISORDER, SINGLE EPISODE, MODERATE (H): ICD-10-CM

## 2018-08-20 DIAGNOSIS — R11.0 NAUSEA: ICD-10-CM

## 2018-08-20 DIAGNOSIS — R42 DIZZINESS: ICD-10-CM

## 2018-08-20 DIAGNOSIS — S09.90XA CLOSED HEAD INJURY, INITIAL ENCOUNTER: ICD-10-CM

## 2018-08-20 DIAGNOSIS — S16.1XXA STRAIN OF NECK MUSCLE, INITIAL ENCOUNTER: ICD-10-CM

## 2018-08-20 LAB
ALBUMIN SERPL-MCNC: 3.5 G/DL (ref 3.4–5)
ALP SERPL-CCNC: 44 U/L (ref 40–150)
ALT SERPL W P-5'-P-CCNC: 15 U/L (ref 0–50)
ANION GAP SERPL CALCULATED.3IONS-SCNC: 7 MMOL/L (ref 3–14)
AST SERPL W P-5'-P-CCNC: 14 U/L (ref 0–45)
BASOPHILS # BLD AUTO: 0 10E9/L (ref 0–0.2)
BASOPHILS NFR BLD AUTO: 0 %
BILIRUB SERPL-MCNC: 0.3 MG/DL (ref 0.2–1.3)
BUN SERPL-MCNC: 18 MG/DL (ref 7–30)
CALCIUM SERPL-MCNC: 8.4 MG/DL (ref 8.5–10.1)
CHLORIDE SERPL-SCNC: 107 MMOL/L (ref 94–109)
CO2 SERPL-SCNC: 27 MMOL/L (ref 20–32)
CREAT SERPL-MCNC: 1.3 MG/DL (ref 0.52–1.04)
DIFFERENTIAL METHOD BLD: ABNORMAL
EOSINOPHIL # BLD AUTO: 0.6 10E9/L (ref 0–0.7)
EOSINOPHIL NFR BLD AUTO: 5 %
ERYTHROCYTE [DISTWIDTH] IN BLOOD BY AUTOMATED COUNT: 13.8 % (ref 10–15)
GFR SERPL CREATININE-BSD FRML MDRD: 40 ML/MIN/1.7M2
GLUCOSE SERPL-MCNC: 103 MG/DL (ref 70–99)
HCT VFR BLD AUTO: 31.4 % (ref 35–47)
HGB BLD-MCNC: 10.4 G/DL (ref 11.7–15.7)
INTERPRETATION ECG - MUSE: NORMAL
LYMPHOCYTES # BLD AUTO: 6 10E9/L (ref 0.8–5.3)
LYMPHOCYTES NFR BLD AUTO: 50 %
MCH RBC QN AUTO: 34.1 PG (ref 26.5–33)
MCHC RBC AUTO-ENTMCNC: 33.1 G/DL (ref 31.5–36.5)
MCV RBC AUTO: 103 FL (ref 78–100)
MONOCYTES # BLD AUTO: 0.4 10E9/L (ref 0–1.3)
MONOCYTES NFR BLD AUTO: 3 %
NEUTROPHILS # BLD AUTO: 5 10E9/L (ref 1.6–8.3)
NEUTROPHILS NFR BLD AUTO: 42 %
PLATELET # BLD AUTO: 161 10E9/L (ref 150–450)
PLATELET # BLD EST: ABNORMAL 10*3/UL
POTASSIUM SERPL-SCNC: 4.4 MMOL/L (ref 3.4–5.3)
PROT SERPL-MCNC: 8 G/DL (ref 6.8–8.8)
RBC # BLD AUTO: 3.05 10E12/L (ref 3.8–5.2)
RBC MORPH BLD: NORMAL
SMUDGE CELLS BLD QL SMEAR: PRESENT
SODIUM SERPL-SCNC: 141 MMOL/L (ref 133–144)
WBC # BLD AUTO: 12 10E9/L (ref 4–11)

## 2018-08-20 PROCEDURE — 93005 ELECTROCARDIOGRAM TRACING: CPT

## 2018-08-20 PROCEDURE — 25000128 H RX IP 250 OP 636

## 2018-08-20 PROCEDURE — 80053 COMPREHEN METABOLIC PANEL: CPT | Performed by: EMERGENCY MEDICINE

## 2018-08-20 PROCEDURE — 99285 EMERGENCY DEPT VISIT HI MDM: CPT | Mod: 25

## 2018-08-20 PROCEDURE — 85025 COMPLETE CBC W/AUTO DIFF WBC: CPT | Performed by: EMERGENCY MEDICINE

## 2018-08-20 PROCEDURE — 72125 CT NECK SPINE W/O DYE: CPT

## 2018-08-20 PROCEDURE — 70450 CT HEAD/BRAIN W/O DYE: CPT

## 2018-08-20 RX ORDER — ONDANSETRON 2 MG/ML
INJECTION INTRAMUSCULAR; INTRAVENOUS
Status: COMPLETED
Start: 2018-08-20 | End: 2018-08-20

## 2018-08-20 RX ADMIN — ONDANSETRON 4 MG: 2 INJECTION INTRAMUSCULAR; INTRAVENOUS at 22:44

## 2018-08-20 ASSESSMENT — ENCOUNTER SYMPTOMS
WEAKNESS: 0
NECK PAIN: 1

## 2018-08-20 NOTE — ED AVS SNAPSHOT
Emergency Department    [address] [facility] [address]    Phone:  [phone]    Fax:  [phone]                                       Mahogany Blanco   MRN: [id number]    Department:   Emergency Department   Date of Visit:  8/20/2018           After Visit Summary Signature Page     I have received my discharge instructions, and my questions have been answered. I have discussed any challenges I see with this plan with the nurse or doctor.    ..........................................................................................................................................  Patient/Patient Representative Signature      ..........................................................................................................................................  Patient Representative Print Name and Relationship to Patient    ..................................................               ................................................  Date                                            Time    ..........................................................................................................................................  Reviewed by Signature/Title    ...................................................              ..............................................  Date                                                            Time

## 2018-08-20 NOTE — ED AVS SNAPSHOT
Emergency Department    [address] [facility] [address]    Phone:  [phone]    Fax:  [phone]                                       Mahogany Blanco   MRN: [id number]    Department:   Emergency Department   Date of Visit:  8/20/2018           Patient Information     Date Of Birth          1940        Your diagnoses for this visit were:     Closed head injury, initial encounter     Strain of neck muscle, initial encounter     Nausea     Dizziness        You were seen by Julián Avendaño MD.      Follow-up Information     Follow up with Aquilino Lilly MD. Call today.    Specialty:  Internal Medicine    Contact information:    [address] [facility] [address]  [phone]          Follow up with  Emergency Department.    Specialty:  EMERGENCY MEDICINE    Why:  As needed, If symptoms worsen    Contact information:    [address] [facility] [id number] [phone]        Discharge Instructions       Discharge Instructions  Neck Strain    You have been seen today for a neck sprain or strain.  Neck strains usually result from an injury to the neck. Car accidents, contact sports, and falls are common causes of neck strain. Sometimes your neck can start to hurt because of increased activity, muscle tension, an abnormal sleeping position, or because of other problems like arthritis in the neck.     Neck pain usually comes from injured muscles and ligaments. Sometimes there is a herniated ( slipped ) disc. We do not usually do MRI scans to look for these right away, since most herniated discs will get better on their own with time. Today, we did not find any evidence that your neck pain was caused by a serious or dangerous condition. However, sometimes symptoms develop over time and cannot be found during an emergency visit, so it is very important that you follow up with your primary provider.    Generally, every Emergency Department visit should have a  follow-up clinic visit with either a primary or a specialty clinic/provider. Please follow-up as instructed by your emergency provider today.    Return to the Emergency Department if:    You have increasing pain in your neck.    You develop difficulty swallowing or breathing.    You have numbness, weakness, or trouble moving your arms or legs.    You have severe dizziness and difficulty walking.    You are unable to control your bladder or bowels.    You develop severe headache or ringing in the ears.    What can I do to help myself at home?    If you had an injury, use cold for the first 1-2 days. Cold helps relieve pain and reduce inflammation.  Apply ice packs to the neck or areas of pain every 1-2 hours for 20 minutes at a time. Place a towel or cloth between your skin and the ice pack.    After the first 2 days, using heat can help with neck pain and stiffness. You may use a warm shower or bath, warm towels on the neck, or a heating pad. Do not sleep with a heating pad, as you can be burned.     Pain medications - You may take a pain medication such as Tylenol  (acetaminophen), Advil  and Motrin  (ibuprofen), or Aleve  (naproxen).    It is usually best to rest the neck for 1-2 days after an injury, then start gentle stretching exercises.     It is helpful to place a small pillow under the nape of your neck to provide proper neutral positioning.     You should stay active and do your usual work as much as you can, unless this involves heavy physical labor. Ask your provider if you need work restrictions.  If you were given a prescription for medicine here today, be sure to read all of the information (including the package insert) that comes with your prescription.  This will include important information about the medicine, its side effects, and any warnings that you need to know about.  The pharmacist who fills the prescription can provide more information and answer questions you may have about the medicine.   If you have questions or concerns that the pharmacist cannot address, please call or return to the Emergency Department.   Remember that you can always come back to the Emergency Department if you are not able to see your regular provider in the amount of time listed above, if you get any new symptoms, or if there is anything that worries you.      Discharge References/Attachments     HEAD INJURY, NO WAKE-UP (ADULT) (ENGLISH)    DIZZINESS, UNCERTAIN CAUSE (ENGLISH)      24 Hour Appointment Hotline       To make an appointment at any [facility], call [phone] ([phone]). If you don't have a family doctor or clinic, we will help you find one. [place] clinics are conveniently located to serve the needs of you and your family.             Review of your medicines      Our records show that you are taking the medicines listed below. If these are incorrect, please call your family doctor or clinic.        Dose / Directions Last dose taken    B-12 1000 MCG Tbcr   Dose:  1000 mcg   Quantity:  90 tablet        Take 1,000 mcg by mouth daily   Refills:  3        blood glucose monitoring lancets        Use to test blood sugar once daily or as directed.   Refills:  0        blood glucose monitoring meter device kit   Commonly known as:  no brand specified   Quantity:  1 kit        Use to test blood sugar one  time daily or as directed.   Refills:  0        * blood glucose monitoring test strip   Commonly known as:  ACCU-CHEK NOELLE   Quantity:  50 each        Use to test blood sugars 1 time daily or as directed.   Refills:  11        * blood glucose monitoring test strip   Commonly known as:  no brand specified   Quantity:  100 strip        Use to test blood sugars one time daily or as directed   Refills:  3        buPROPion 150 MG 24 hr tablet   Commonly known as:  WELLBUTRIN XL   Quantity:  30 tablet        TAKE 1 TABLET(150 MG) BY MOUTH EVERY MORNING   Refills:  0        citalopram 20 MG tablet   Commonly  known as:  celeXA   Quantity:  30 tablet        TAKE 1 TABLET(20 MG) BY MOUTH DAILY   Refills:  2        fluticasone 50 MCG/ACT spray   Commonly known as:  FLONASE   Dose:  1-2 spray   Quantity:  16 g        Spray 1-2 sprays into both nostrils daily   Refills:  2        levothyroxine 88 MCG tablet   Commonly known as:  SYNTHROID/LEVOTHROID   Dose:  88 mcg   Quantity:  90 tablet        Take 1 tablet (88 mcg) by mouth daily   Refills:  3        lisinopril 40 MG tablet   Commonly known as:  PRINIVIL/ZESTRIL   Quantity:  90 tablet        TAKE 1 TABLET(40 MG) BY MOUTH DAILY   Refills:  3        omeprazole 20 MG CR capsule   Commonly known as:  priLOSEC   Quantity:  90 capsule        TAKE 1 CAPSULE(20 MG) BY MOUTH DAILY   Refills:  2        order for DME   Quantity:  1 Device        Disp one glucometer that is covered by insurance with 1 RF. Also disp glucometer strips and lancets to check sugars daily. Disp 100 strips and  Lancets  with prn RFs for 1 year   Refills:  11        order for DME   Quantity:  1 each        Equipment being ordered:   AeroChamber (or similar device for inhaler use)   Refills:  0        pravastatin 40 MG tablet   Commonly known as:  PRAVACHOL   Quantity:  30 tablet        TAKE 1 TABLET BY MOUTH DAILY AT BEDTIME   Refills:  8        VENTOLIN  (90 Base) MCG/ACT inhaler   Quantity:  18 g   Generic drug:  albuterol        INHALE 2 PUFFS BY MOUTH INTO THE LUNGS EVERY 4 HOURS AS NEEDED FOR SHORTNESS OF BREATH OR WHEEZING.   Refills:  11        vitamin D 2000 units tablet   Dose:  2000 Units   Quantity:  100 tablet        Take 2,000 Units by mouth daily   Refills:  3        * Notice:  This list has 2 medication(s) that are the same as other medications prescribed for you. Read the directions carefully, and ask your doctor or other care provider to review them with you.            Procedures and tests performed during your visit     Activity: Ambulate    CBC with platelets differential    CT  Cervical Spine w/o Contrast    CT Head w/o Contrast    Cardiac Continuous Monitoring    Comprehensive metabolic panel    EKG 12-lead, tracing only    Peripheral IV catheter    Pulse oximetry nursing      Orders Needing Specimen Collection     None      Pending Results     No orders found for last 3 day(s).            Pending Culture Results     No orders found for last 3 day(s).            Pending Results Instructions     If you had any lab results that were not finalized at the time of your Discharge, you can call the ED Lab Result RN at [phone]. You will be contacted by this team for any positive Lab results or changes in treatment. The nurses are available 7 days a week from 10A to 6:30P.  You can leave a message 24 hours per day and they will return your call.        Test Results From Your Hospital Stay        8/20/2018 11:21 PM      Component Results     Component Value Ref Range & Units Status    WBC 12.0 (H) 4.0 - 11.0 10e9/L Final    RBC Count 3.05 (L) 3.8 - 5.2 10e12/L Final    Hemoglobin 10.4 (L) 11.7 - 15.7 g/dL Final    Hematocrit 31.4 (L) 35.0 - 47.0 % Final     (H) 78 - 100 fl Final    MCH 34.1 (H) 26.5 - 33.0 pg Final    MCHC 33.1 31.5 - 36.5 g/dL Final    RDW 13.8 10.0 - 15.0 % Final    Platelet Count 161 150 - 450 10e9/L Final    Diff Method Manual Differential  Final    % Neutrophils 42.0 % Final    % Lymphocytes 50.0 % Final    % Monocytes 3.0 % Final    % Eosinophils 5.0 % Final    % Basophils 0.0 % Final    Absolute Neutrophil 5.0 1.6 - 8.3 10e9/L Final    Absolute Lymphocytes 6.0 (H) 0.8 - 5.3 10e9/L Final    Absolute Monocytes 0.4 0.0 - 1.3 10e9/L Final    Absolute Eosinophils 0.6 0.0 - 0.7 10e9/L Final    Absolute Basophils 0.0 0.0 - 0.2 10e9/L Final    Smudge Cells Present  Final    RBC Morphology Normal  Final    Platelet Estimate   Final    Automated count confirmed.  Platelet morphology is normal.         8/20/2018 11:16 PM      Component Results     Component Value Ref Range  & Units Status    Sodium 141 133 - 144 mmol/L Final    Potassium 4.4 3.4 - 5.3 mmol/L Final    Chloride 107 94 - 109 mmol/L Final    Carbon Dioxide 27 20 - 32 mmol/L Final    Anion Gap 7 3 - 14 mmol/L Final    Glucose 103 (H) 70 - 99 mg/dL Final    Urea Nitrogen 18 7 - 30 mg/dL Final    Creatinine 1.30 (H) 0.52 - 1.04 mg/dL Final    GFR Estimate 40 (L) >60 mL/min/1.7m2 Final    Non  GFR Calc    GFR Estimate If Black 48 (L) >60 mL/min/1.7m2 Final    African American GFR Calc    Calcium 8.4 (L) 8.5 - 10.1 mg/dL Final    Bilirubin Total 0.3 0.2 - 1.3 mg/dL Final    Albumin 3.5 3.4 - 5.0 g/dL Final    Protein Total 8.0 6.8 - 8.8 g/dL Final    Alkaline Phosphatase 44 40 - 150 U/L Final    ALT 15 0 - 50 U/L Final    AST 14 0 - 45 U/L Final         8/21/2018 12:34 AM      Narrative     CT HEAD W/O CONTRAST  8/20/2018 11:53 PM     HISTORY: Acute pain after trauma.    TECHNIQUE: Axial images of the head and coronal reformations without  IV contrast material. Radiation dose for this scan was reduced using  automated exposure control, adjustment of the mA and/or kV according  to patient size, or iterative reconstruction technique.    COMPARISON: 4/17/2015.    FINDINGS: No intracranial hemorrhage, mass or mass effect. No acute  infarct identified. No shift of midline structures. The ventricles are  symmetric. No skull fractures. Right maxillary sinus retention cyst.        Impression     IMPRESSION: No acute findings.    XIN CHURCH MD         8/21/2018 12:05 AM      Narrative     CT CERVICAL SPINE W/O CONTRAST 8/20/2018 11:53 PM     HISTORY: Injury. Acute pain after trauma.    TECHNIQUE: Axial images through cervical spine without contrast.  Sagittal and coronal reformatted images. Radiation dose for this scan  was reduced using automated exposure control, adjustment of the mA  and/or kV according to patient size, or iterative reconstruction  technique.    COMPARISON: Cervical spine MRI  11/1/2009.    FINDINGS: No acute fractures. Moderate degenerative and hypertrophic  changes throughout the cervical spine and cervical facet joints  bilaterally, increased since prior study. Degenerative disc disease at  the C6-7 interspace and mild narrowing of a few other interspaces.  Loss of normal lordosis with mild cervical kyphosis centered about  C6-7. Central canal is mild to moderately narrowed at C6-7 related to  degenerative and hypertrophic changes. No prevertebral soft tissue  swelling or significant central canal narrowing.        Impression     IMPRESSION:  1. No fractures or other acute findings.  2. Moderate degenerative and hypertrophic changes.    XIN CHURCH MD                Clinical Quality Measure: Blood Pressure Screening     Your blood pressure was checked while you were in the emergency department today. The last reading we obtained was  BP: 125/44 . Please read the guidelines below about what these numbers mean and what you should do about them.  If your systolic blood pressure (the top number) is less than 120 and your diastolic blood pressure (the bottom number) is less than 80, then your blood pressure is normal. There is nothing more that you need to do about it.  If your systolic blood pressure (the top number) is 120-139 or your diastolic blood pressure (the bottom number) is 80-89, your blood pressure may be higher than it should be. You should have your blood pressure rechecked within a year by a primary care provider.  If your systolic blood pressure (the top number) is 140 or greater or your diastolic blood pressure (the bottom number) is 90 or greater, you may have high blood pressure. High blood pressure is treatable, but if left untreated over time it can put you at risk for heart attack, stroke, or kidney failure. You should have your blood pressure rechecked by a primary care provider within the next 4 weeks.  If your provider in the emergency department today gave  you specific instructions to follow-up with your doctor or provider even sooner than that, you should follow that instruction and not wait for up to 4 weeks for your follow-up visit.        Thank you for choosing [facility]       Thank you for choosing [facility] for your care. Our goal is always to provide you with excellent care. Hearing back from our patients is one way we can continue to improve our services. Please take a few minutes to complete the written survey that you may receive in the mail after you visit with us. Thank you!        Care EveryWhere ID     This is your Care EveryWhere ID. This could be used by other organizations to access your [facility] medical records  [id number]        Equal Access to Services     ALEJO ABERNATHY : Ej Woodard, wicho darby, rinku garvin . So [facility] [phone].    ATENCIÓN: Si habla español, tiene a jennings disposición servicios gratuitos de asistencia lingüística. Llame al [phone].    We comply with applicable federal civil rights laws and [place] laws. We do not discriminate on the basis of race, color, national origin, age, disability, sex, sexual orientation, or gender identity.            After Visit Summary       This is your record. Keep this with you and show to your community pharmacist(s) and doctor(s) at your next visit.

## 2018-08-21 VITALS
TEMPERATURE: 98.4 F | SYSTOLIC BLOOD PRESSURE: 123 MMHG | RESPIRATION RATE: 14 BRPM | DIASTOLIC BLOOD PRESSURE: 46 MMHG | OXYGEN SATURATION: 97 %

## 2018-08-21 RX ORDER — BUPROPION HYDROCHLORIDE 150 MG/1
TABLET ORAL
Qty: 30 TABLET | Refills: 4 | Status: SHIPPED | OUTPATIENT
Start: 2018-08-21 | End: 2019-01-17

## 2018-08-21 NOTE — ED NOTES
Bed: ED01  Expected date:   Expected time:   Means of arrival:   Comments:  [address]:  78F Dizzy/Fall
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
## 2018-08-21 NOTE — ED TRIAGE NOTES
Pt at home this evening. Bent down to get her cat and became dizzy. Fell and hit head on coffee table. Pt is actively vomiting upon arrival to ED.

## 2018-08-21 NOTE — TELEPHONE ENCOUNTER
"Requested Prescriptions   Pending Prescriptions Disp Refills     buPROPion (WELLBUTRIN XL) 150 MG 24 hr tablet [Pharmacy Med Name: BUPROPION XL 150MG TABLETS (24 H)]  Last Written Prescription Date:  07/20/2018  Last Fill Quantity: 30,  # refills: 0   Last Office Visit: 7/23/2018   Future Office Visit:      30 tablet 0     Sig: TAKE 1 TABLET(150 MG) BY MOUTH EVERY MORNING    SSRIs Protocol Passed    8/20/2018 11:17 AM       Passed - PHQ-9 score less than 5 in past 6 months    Please review last PHQ-9 score.          Passed - Medication is Bupropion    If the medication is Bupropion (Wellbutrin), and the patient is taking for smoking cessation; OK to refill.         Passed - Patient is age 18 or older       Passed - No active pregnancy on record       Passed - No positive pregnancy test in last 12 months       Passed - Recent (6 mo) or future (30 days) visit within the authorizing provider's specialty    Patient had office visit in the last 6 months or has a visit in the next 30 days with authorizing provider or within the authorizing provider's specialty.  See \"Patient Info\" tab in inbasket, or \"Choose Columns\" in Meds & Orders section of the refill encounter.              "

## 2018-08-23 NOTE — PROGRESS NOTES
SUBJECTIVE:   Mahogany Blanco is a 78 year old female who presents to clinic today for the following health issues:    Chief Complaint   Patient presents with     Hospital F/U       ED/UC Followup:    Facility:  [facility]  Date of visit: 08/20/2018  Reason for visit: Fall, Head Injury  Current Status: Some in left shoulder and left side of neck       Pt's past medical history, family history, habits, medications and allergies were reviewed with the patient today.  See snap shot for  HCM status. Most recent lab results reviewed with pt. Problem list and histories reviewed & adjusted, as indicated.  Additional history as below:    Recent ER note reviewed. Part of the note as below:    Medical Decision Making:  The cause of her presenting dizziness is unconfirmed.  This could be simply due to her age and the sudden change in position when she bent over quite far to get to her cat, though I did consider more serious possibilities including cardiac arrhythmia and neurovascular event such as stroke.  I explained to the patient and her family that testing to this point is reassuring though does not definitively rule out some of these possibilities.  She has passed a road test.  She is passing oral challenge.  I offered to hospitalize her overnight, acknowledging this diagnostic uncertainty and the slight risk of having missed a potentially more dangerous condition, though she is quite adamant about going home.  Her family was present for these conversations.  Her head and neck discomfort started only after the fall I think is likely traumatic in etiology rather than representative of a cervical artery dissection for example or any other primary cause of her dizziness.  She was specifically asked to return here for acute worsening, which could be manifested with confusion, speech troubles, repetitive vomiting, lateralizing weakness, or other symptoms which I specifically reviewed with them.  I otherwise asked her to  recheck through her clinic in the next few days.  She is content with this plan, confirmed her understanding of it, and was discharged home in improved condition.  She declined any analgesics here in the ED.        Diagnosis:      ICD-10-CM     1. Closed head injury, initial encounter S09.90XA     2. Strain of neck muscle, initial encounter S16.1XXA     3. Nausea R11.0     4. Dizziness R42      Also reviewed recent l;ab results with pt    Component      Latest Ref Rng & Units 8/29/2017   WBC      4.0 - 11.0 10e9/L    RBC Count      3.8 - 5.2 10e12/L    Hemoglobin      11.7 - 15.7 g/dL    Hematocrit      35.0 - 47.0 %    MCV      78 - 100 fl    MCH      26.5 - 33.0 pg    MCHC      31.5 - 36.5 g/dL    RDW      10.0 - 15.0 %    Platelet Count      150 - 450 10e9/L    Albumin Fraction      3.7 - 5.1 g/dL 3.8   Alpha 1 Fraction      0.2 - 0.4 g/dL 0.3   Alpha 2 Fraction      0.5 - 0.9 g/dL 0.9   Beta Fraction      0.6 - 1.0 g/dL 0.9   Gamma Fraction      0.7 - 1.6 g/dL 1.5   Monoclonal Peak      0.0 g/dL 0.2 (H)   ELP Interpretation:       Small monoclonal protein (about 0.2 g/dL) seen in the gamma fraction, not previously . . .   Immunofixation ELP          IGG      695 - 1620 mg/dL    IGA      70 - 380 mg/dL    IGM      60 - 265 mg/dL    Kappa Free Lt Chain      0.33 - 1.94 mg/dL    Lambda Free Lt Chain      0.57 - 2.63 mg/dL    Kappa Lambda Ratio      0.26 - 1.65    JACINTO interpretation      NEG:Negative    JACINTO pattern 1          JACINTO titer 1          Iron      35 - 180 ug/dL    Iron Binding Cap      240 - 430 ug/dL    Iron Saturation Index      15 - 46 %    % Retic      0.5 - 2.0 %    Absolute Retic      25 - 95 10e9/L    JACINTO Screen by EIA      <1.0 1.7 (H)   Histone Antibody,IgG      0.0 - 0.9 Units    Melva 1 Antibody IgG      0.0 - 0.9 AI    Garcia DARIUSZ Antibody IgG      0.0 - 0.9 AI    RNP Antibody IgG      0.0 - 0.9 AI    SSB (La) (DARIUSZ) Antibody, IgG      0.0 - 0.9 AI    SSA (Ro) (DARIUSZ) Antibody, IgG      0.0 - 0.9 AI     SSDNA Antibody IgG      0 - 19 EU    DNA-ds      <10 IU/mL    Immunofix ELP Urine          TSH      0.40 - 4.00 mU/L    CRP Inflammation      0.0 - 8.0 mg/L    Vitamin B12      193 - 986 pg/mL    T4 Free      0.76 - 1.46 ng/dL      Component      Latest Ref Rng & Units 12/20/2017   WBC      4.0 - 11.0 10e9/L 10.3   RBC Count      3.8 - 5.2 10e12/L 3.23 (L)   Hemoglobin      11.7 - 15.7 g/dL 10.7 (L)   Hematocrit      35.0 - 47.0 % 34.1 (L)   MCV      78 - 100 fl 106 (H)   MCH      26.5 - 33.0 pg 33.1 (H)   MCHC      31.5 - 36.5 g/dL 31.4 (L)   RDW      10.0 - 15.0 % 13.2   Platelet Count      150 - 450 10e9/L 147 (L)   Albumin Fraction      3.7 - 5.1 g/dL    Alpha 1 Fraction      0.2 - 0.4 g/dL    Alpha 2 Fraction      0.5 - 0.9 g/dL    Beta Fraction      0.6 - 1.0 g/dL    Gamma Fraction      0.7 - 1.6 g/dL    Monoclonal Peak      0.0 g/dL    ELP Interpretation:          Immunofixation ELP       (Note)   IGG      695 - 1620 mg/dL 1400   IGA      70 - 380 mg/dL 274   IGM      60 - 265 mg/dL 208   [place] Free Lt Chain      0.33 - 1.94 mg/dL 3.64 (H)   Lambda Free Lt Chain      0.57 - 2.63 mg/dL 5.15 (H)   Kappa Lambda Ratio      0.26 - 1.65 0.71   JACINTO interpretation      NEG:Negative    JACINTO pattern 1          JACINTO titer 1          Iron      35 - 180 ug/dL    Iron Binding Cap      240 - 430 ug/dL    Iron Saturation Index      15 - 46 %    % Retic      0.5 - 2.0 %    Absolute Retic      25 - 95 10e9/L    JACINTO Screen by EIA      <1.0    Histone Antibody,IgG      0.0 - 0.9 Units 0.4   Melva 1 Antibody IgG      0.0 - 0.9 AI <0.2   Garcia DARIUSZ Antibody IgG      0.0 - 0.9 AI <0.2   RNP Antibody IgG      0.0 - 0.9 AI 2.0 (H)   SSB (La) (DARIUSZ) Antibody, IgG      0.0 - 0.9 AI <0.2   SSA (Ro) (DARIUSZ) Antibody, IgG      0.0 - 0.9 AI <0.2   SSDNA Antibody IgG      0 - 19 EU 15   DNA-ds      <10 IU/mL 2   Immunofix ELP Urine       (Note)   TSH      0.40 - 4.00 mU/L    CRP Inflammation      0.0 - 8.0 mg/L    Vitamin B12      193 - 986 pg/mL     T4 Free      0.76 - 1.46 ng/dL      Component      Latest Ref Rng & Units 7/23/2018   WBC      4.0 - 11.0 10e9/L 11.4 (H)   RBC Count      3.8 - 5.2 10e12/L 3.19 (L)   Hemoglobin      11.7 - 15.7 g/dL 11.0 (L)   Hematocrit      35.0 - 47.0 % 34.0 (L)   MCV      78 - 100 fl 107 (H)   MCH      26.5 - 33.0 pg 34.5 (H)   MCHC      31.5 - 36.5 g/dL 32.4   RDW      10.0 - 15.0 % 13.4   Platelet Count      150 - 450 10e9/L 160   Albumin Fraction      3.7 - 5.1 g/dL 4.0   Alpha 1 Fraction      0.2 - 0.4 g/dL 0.3   Alpha 2 Fraction      0.5 - 0.9 g/dL 0.9   Beta Fraction      0.6 - 1.0 g/dL 0.9   Gamma Fraction      0.7 - 1.6 g/dL 1.7 (H)   Monoclonal Peak      0.0 g/dL 0.3 (H)   ELP Interpretation:       Small monoclonal protein (0.3 g/dL) seen in the gamma fraction. Previously characterized . . .   Immunofixation ELP          IGG      695 - 1620 mg/dL    IGA      70 - 380 mg/dL    IGM      60 - 265 mg/dL    Kappa Free Lt Chain      0.33 - 1.94 mg/dL 5.76 (H)   Lambda Free Lt Chain      0.57 - 2.63 mg/dL 5.26 (H)   Kappa Lambda Ratio      0.26 - 1.65 1.10   JACINTO interpretation      NEG:Negative Positive (A)   JACINTO pattern 1       SPECKLED   JACINTO titer 1       1:320   Iron      35 - 180 ug/dL 86   Iron Binding Cap      240 - 430 ug/dL 314   Iron Saturation Index      15 - 46 % 27   % Retic      0.5 - 2.0 % 1.8   Absolute Retic      25 - 95 10e9/L 56.9   JACINTO Screen by EIA      <1.0    Histone Antibody,IgG      0.0 - 0.9 Units    Melva 1 Antibody IgG      0.0 - 0.9 AI    Garcia DARIUSZ Antibody IgG      0.0 - 0.9 AI    RNP Antibody IgG      0.0 - 0.9 AI    SSB (La) (DARIUSZ) Antibody, IgG      0.0 - 0.9 AI    SSA (Ro) (DARIUSZ) Antibody, IgG      0.0 - 0.9 AI    SSDNA Antibody IgG      0 - 19 EU    DNA-ds      <10 IU/mL    Immunofix ELP Urine          TSH      0.40 - 4.00 mU/L 27.79 (H)   CRP Inflammation      0.0 - 8.0 mg/L <2.9   Vitamin B12      193 - 986 pg/mL 892   T4 Free      0.76 - 1.46 ng/dL 0.61 (L)       TSH elevated. Pt didn't  "realize that has not been taking thyroid medication, MD spoke with pharmacy and last filled Aug 2017. Has been more tired and cold intolerance. Some constipation.   Light chains a little worse. Monoclonal protein slightly more.  Pt states she would not be interested in bone marrow biopsy or any chemotherapy if MGUS worsened at this time. JACINTO again positive but previous josefina breakdown fairly unremarkable. HAs some general stiffness in joints but no redness/acute swelling. HAs soreness in left trapezial area from fall. Mental status at baseline per pt and grandson here today. No N/V. Denies dizziness now. Thinks issue was solely bending over too far to  cat     Additional ROS:   Constitutional, HEENT, Cardiovascular, Pulmonary, GI and , Neuro, MSK and Psych review of systems/symptoms are otherwise negative or unchanged from previous, except as noted above.      OBJECTIVE:  /68  Pulse 66  Temp 97.7  F (36.5  C) (Oral)  Resp 16  Ht 5' 4\" (1.626 m)  Wt 248 lb (112.5 kg)  LMP 06/16/1985 (Approximate)  SpO2 93%  BMI 42.57 kg/m2   Estimated body mass index is 42.57 kg/(m^2) as calculated from the following:    Height as of this encounter: 5' 4\" (1.626 m).    Weight as of this encounter: 248 lb (112.5 kg).  Eye: PERRL, EOMI  HENT: ear canals and TM's normal and nose and mouth without ulcers or lesions   Neck: no adenopathy. Thyroid normal to palpation. No bruits  Pulm: Lungs clear to auscultation   CV: Regular rates and rhythm  GI: Soft, obese, nontender, Normal active bowel sounds, No hepatosplenomegaly or masses palpable  Ext: Peripheral pulses intact. Mild chronic BLE edema.  Neuro: Normal strength and tone, sensory exam grossly normal  MSK: Tenderness to palpation left trapezial musculature and left paracervical musculature. Neck ROM intact. No localizing joint erythema/warmth      Assessment/Plan: (See plan discussion below for further details)  1. Cervicalgia  Improved. See plan below    2. " Hypothyroidism, unspecified type  Untreated as pt has been off of med. Will restart. Future lab as ordered  - levothyroxine (SYNTHROID/LEVOTHROID) 88 MCG tablet; Take 1 tablet (88 mcg) by mouth daily  Dispense: 90 tablet; Refill: 3  - TSH with free T4 reflex; Future    3. MGUS (monoclonal gammopathy of unknown significance)  Pt declines treatment if would get more aggressive or BM bx at this time. WIll repeat labs 6 mos  - Clarks and lambda light chain; Future  - Protein electrophoresis; Future  - Immunoglobulins A G and M; Future  - CBC with platelets; Future    4. CKD (chronic kidney disease) stage 3, GFR 30-59 ml/min  Continue monitoring. Future lab 6 weeks  - Basic metabolic panel; Future    5. Type 2 diabetes mellitus with stage 3 chronic kidney disease, without long-term current use of insulin (H)  Has been controlled. Repeat lab 6 weeks  - Basic metabolic panel; Future  - Hemoglobin A1c; Future    6. Anemia, unspecified type   Due to #3 and 7. Recheck lab 6 mos. Stable  - CBC with platelets; Future    7. Anemia due to vitamin B12 deficiency, unspecified B12 deficiency type  Continue B12 supplementation  - Cyanocobalamin (B-12) 1000 MCG TBCR; Take 1,000 mcg by mouth daily  Dispense: 90 tablet; Refill: 3    Plan discussion:   Heat AM to the neck and upper back  Neck stretching in AM   May use cold to numb up the pain areas later in the day as needed   Tylenol ES 1-2 tabs up to 3 times a day as needed for pain  If wish to start physical therapy, let me know  Continue other medications   Nonfasting labs in early/mid October  Flu shot in  Sept/October  Restart Levothyroxine 88mcg tab, 1 tab daily in AM for thyroid  Repeat MGUS labs in 6 months       Aquilino Lilly MD  Internal Medicine Department  [facility]

## 2018-08-24 ENCOUNTER — OFFICE VISIT (OUTPATIENT)
Dept: INTERNAL MEDICINE | Facility: CLINIC | Age: 78
End: 2018-08-24
Payer: COMMERCIAL

## 2018-08-24 VITALS
WEIGHT: 248 LBS | HEIGHT: 64 IN | TEMPERATURE: 97.7 F | DIASTOLIC BLOOD PRESSURE: 68 MMHG | RESPIRATION RATE: 16 BRPM | OXYGEN SATURATION: 93 % | BODY MASS INDEX: 42.34 KG/M2 | HEART RATE: 66 BPM | SYSTOLIC BLOOD PRESSURE: 124 MMHG

## 2018-08-24 DIAGNOSIS — D64.9 ANEMIA, UNSPECIFIED TYPE: ICD-10-CM

## 2018-08-24 DIAGNOSIS — N18.30 TYPE 2 DIABETES MELLITUS WITH STAGE 3 CHRONIC KIDNEY DISEASE, WITHOUT LONG-TERM CURRENT USE OF INSULIN (H): ICD-10-CM

## 2018-08-24 DIAGNOSIS — M54.2 CERVICALGIA: Primary | ICD-10-CM

## 2018-08-24 DIAGNOSIS — D47.2 MGUS (MONOCLONAL GAMMOPATHY OF UNKNOWN SIGNIFICANCE): ICD-10-CM

## 2018-08-24 DIAGNOSIS — D51.9 ANEMIA DUE TO VITAMIN B12 DEFICIENCY, UNSPECIFIED B12 DEFICIENCY TYPE: ICD-10-CM

## 2018-08-24 DIAGNOSIS — E11.22 TYPE 2 DIABETES MELLITUS WITH STAGE 3 CHRONIC KIDNEY DISEASE, WITHOUT LONG-TERM CURRENT USE OF INSULIN (H): ICD-10-CM

## 2018-08-24 DIAGNOSIS — E03.9 HYPOTHYROIDISM, UNSPECIFIED TYPE: ICD-10-CM

## 2018-08-24 DIAGNOSIS — N18.30 CKD (CHRONIC KIDNEY DISEASE) STAGE 3, GFR 30-59 ML/MIN (H): ICD-10-CM

## 2018-08-24 PROCEDURE — 99214 OFFICE O/P EST MOD 30 MIN: CPT | Performed by: INTERNAL MEDICINE

## 2018-08-24 RX ORDER — LEVOTHYROXINE SODIUM 88 UG/1
88 TABLET ORAL DAILY
Qty: 90 TABLET | Refills: 3 | Status: SHIPPED | OUTPATIENT
Start: 2018-08-24 | End: 2019-08-07

## 2018-08-24 RX ORDER — ALBUTEROL SULFATE 90 UG/1
AEROSOL, METERED RESPIRATORY (INHALATION)
Qty: 18 G | Refills: 11 | Status: SHIPPED | OUTPATIENT
Start: 2018-08-24 | End: 2021-01-01

## 2018-08-24 NOTE — PATIENT INSTRUCTIONS
Heat AM to the neck and upper back  Neck stretching in AM   May use cold to numb up the pain areas later in the day as needed   Tylenol ES 1-2 tabs up to 3 times a day as needed for pain  If wish to start physical therapy, let me know  Continue other medications   Nonfasting labs in early/mid October  Flu shot in  Sept/October  Restart Levothyroxine 88mcg tab, 1 tab daily in AM for thyroid

## 2018-08-24 NOTE — MR AVS SNAPSHOT
After Visit Summary   8/24/2018    Mahogany Blanco    MRN: [id number]           Patient Information     Date Of Birth          1940        Visit Information        Provider Department      8/24/2018 2:00 PM Aquilino Lilly MD [facility]        Today's Diagnoses     CKD (chronic kidney disease) stage 3, GFR 30-59 ml/min    -  1    Hypothyroidism, unspecified type        SOB (shortness of breath)        Type 2 diabetes mellitus with stage 3 chronic kidney disease, without long-term current use of insulin (H)        Anemia, unspecified type          Care Instructions     Heat AM to the neck and upper back  Neck stretching in AM   May use cold to numb up the pain areas later in the day as needed   Tylenol ES 1-2 tabs up to 3 times a day as needed for pain  If wish to start physical therapy, let me know  Continue other medications   Nonfasting labs in early/mid October  Flu shot in  Sept/October  Restart Levothyroxine 88mcg tab, 1 tab daily in AM for thyroid          Follow-ups after your visit        Future tests that were ordered for you today     Open Future Orders        Priority Expected Expires Ordered    TSH with free T4 reflex Routine 10/5/2018 8/24/2019 8/24/2018    CBC with platelets Routine 10/5/2018 8/24/2019 8/24/2018    Basic metabolic panel Routine 10/5/2018 8/24/2019 8/24/2018    Hemoglobin A1c Routine 10/5/2018 8/24/2019 8/24/2018            Who to contact     If you have questions or need follow up information about today's clinic visit or your schedule please contact [facility] directly at [phone].  Normal or non-critical lab and imaging results will be communicated to you by MyChart, letter or phone within 4 business days after the clinic has received the results. If you do not hear from us within 7 days, please contact the clinic through MyChart or phone. If you have a critical or abnormal lab result, we will notify you by phone  "as soon as possible.  Submit refill requests through [email or web address] or call your pharmacy and they will forward the refill request to us. Please allow 3 business days for your refill to be completed.          Additional Information About Your Visit        Care EveryWhere ID     This is your Care EveryWhere ID. This could be used by other organizations to access your [place] medical records  [id number]        Your Vitals Were     Pulse Temperature Respirations Height Last Period Pulse Oximetry    66 97.7  F (36.5  C) (Oral) 16 5' 4\" (1.626 m) 06/16/1985 (Approximate) 93%    BMI (Body Mass Index)                   42.57 kg/m2            Blood Pressure from Last 3 Encounters:   08/24/18 124/68   08/21/18 123/46   01/19/18 140/78    Weight from Last 3 Encounters:   08/24/18 248 lb (112.5 kg)   07/26/18 244 lb (110.7 kg)   09/01/17 265 lb (120.2 kg)                 Where to get your medicines      These medications were sent to [facility] [address] [place] [address] [address] AT [facility] [place]  [address], [facility] [address]     Phone:  [phone]     albuterol 108 (90 Base) MCG/ACT inhaler    levothyroxine 88 MCG tablet          Primary Care Provider Office Phone # Fax #    Aquilino Lilly [phone]       [address]  [facility] [address]        Equal Access to Services     EMRE ABERNATHY AH: Hadii aad ku hadasho Soomaali, waaxda luqadaha, qaybta kaalmada adeegyada, rinku calderon. So [facility] [phone].    ATENCIÓN: Si habla español, tiene a jennings disposición servicios gratuitos de asistencia lingüística. Llame al [phone].    We comply with applicable federal civil rights laws and [place] laws. We do not discriminate on the basis of race, color, national origin, age, disability, sex, sexual orientation, or gender identity.            Thank you!     Thank you for choosing [facility]  for your care. " Our goal is always to provide you with excellent care. Hearing back from our patients is one way we can continue to improve our services. Please take a few minutes to complete the written survey that you may receive in the mail after your visit with us. Thank you!             Your Updated Medication List - Protect others around you: Learn how to safely use, store and throw away your medicines at www.disposemymeds.org.          This list is accurate as of 8/24/18  2:40 PM.  Always use your most recent med list.                   Brand Name Dispense Instructions for use Diagnosis    albuterol 108 (90 Base) MCG/ACT inhaler    VENTOLIN HFA    18 g    INHALE 2 PUFFS BY MOUTH INTO THE LUNGS EVERY 4 HOURS AS NEEDED FOR SHORTNESS OF BREATH OR WHEEZING.    SOB (shortness of breath)       B-12 1000 MCG Tbcr     90 tablet    Take 1,000 mcg by mouth daily    B12 deficiency anemia       blood glucose monitoring lancets      Use to test blood sugar once daily or as directed.    Type 2 diabetes, HbA1c goal < 7% (H)       blood glucose monitoring meter device kit    no brand specified    1 kit    Use to test blood sugar one  time daily or as directed.    Type 2 diabetes mellitus with stage 3 chronic kidney disease, without long-term current use of insulin (H)       * blood glucose monitoring test strip    ACCU-CHEK NOELLE    50 each    Use to test blood sugars 1 time daily or as directed.    Type 2 diabetes mellitus with stage 3 chronic kidney disease (H)       * blood glucose monitoring test strip    no brand specified    100 strip    Use to test blood sugars one time daily or as directed    Type 2 diabetes mellitus with stage 3 chronic kidney disease, without long-term current use of insulin (H)       buPROPion 150 MG 24 hr tablet    WELLBUTRIN XL    30 tablet    TAKE 1 TABLET(150 MG) BY MOUTH EVERY MORNING    Major depressive disorder, single episode, moderate (H)       citalopram 20 MG tablet    celeXA    30 tablet    TAKE 1  TABLET(20 MG) BY MOUTH DAILY    Major depressive disorder, single episode, moderate (H)       fluticasone 50 MCG/ACT spray    FLONASE    16 g    Spray 1-2 sprays into both nostrils daily    URI (upper respiratory infection)       levothyroxine 88 MCG tablet    SYNTHROID/LEVOTHROID    90 tablet    Take 1 tablet (88 mcg) by mouth daily    Hypothyroidism, unspecified type       lisinopril 40 MG tablet    PRINIVIL/ZESTRIL    90 tablet    TAKE 1 TABLET(40 MG) BY MOUTH DAILY    Essential hypertension, benign       omeprazole 20 MG CR capsule    priLOSEC    90 capsule    TAKE 1 CAPSULE(20 MG) BY MOUTH DAILY    Gastroesophageal reflux disease, esophagitis presence not specified       order for DME     1 Device    Disp one glucometer that is covered by insurance with 1 RF. Also disp glucometer strips and lancets to check sugars daily. Disp 100 strips and  Lancets  with prn RFs for 1 year    Type 2 diabetes, HbA1c goal < 7% (H)       order for DME     1 each    Equipment being ordered:   AeroChamber (or similar device for inhaler use)    Bronchospasm       pravastatin 40 MG tablet    PRAVACHOL    30 tablet    TAKE 1 TABLET BY MOUTH DAILY AT BEDTIME    Hyperlipidemia LDL goal <100       vitamin D 2000 units tablet     100 tablet    Take 2,000 Units by mouth daily    Vitamin D deficiency       * Notice:  This list has 2 medication(s) that are the same as other medications prescribed for you. Read the directions carefully, and ask your doctor or other care provider to review them with you.

## 2018-10-12 ENCOUNTER — APPOINTMENT (OUTPATIENT)
Dept: CT IMAGING | Facility: CLINIC | Age: 78
End: 2018-10-12
Attending: EMERGENCY MEDICINE
Payer: COMMERCIAL

## 2018-10-12 ENCOUNTER — HOSPITAL ENCOUNTER (EMERGENCY)
Facility: CLINIC | Age: 78
Discharge: HOME | End: 2018-10-12
Attending: EMERGENCY MEDICINE | Admitting: EMERGENCY MEDICINE
Payer: COMMERCIAL

## 2018-10-12 ENCOUNTER — APPOINTMENT (OUTPATIENT)
Dept: GENERAL RADIOLOGY | Facility: CLINIC | Age: 78
End: 2018-10-12
Attending: EMERGENCY MEDICINE
Payer: COMMERCIAL

## 2018-10-12 VITALS
SYSTOLIC BLOOD PRESSURE: 115 MMHG | RESPIRATION RATE: 11 BRPM | OXYGEN SATURATION: 97 % | HEIGHT: 64 IN | TEMPERATURE: 98.3 F | WEIGHT: 260 LBS | BODY MASS INDEX: 44.39 KG/M2 | DIASTOLIC BLOOD PRESSURE: 49 MMHG
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
## 2018-10-12 DIAGNOSIS — T14.8XXA SKIN ABRASION: ICD-10-CM

## 2018-10-12 LAB
ANION GAP SERPL CALCULATED.3IONS-SCNC: 5 MMOL/L (ref 3–14)
BASOPHILS # BLD AUTO: 0 10E9/L (ref 0–0.2)
BASOPHILS NFR BLD AUTO: 0.2 %
BUN SERPL-MCNC: 21 MG/DL (ref 7–30)
CALCIUM SERPL-MCNC: 8.8 MG/DL (ref 8.5–10.1)
CHLORIDE SERPL-SCNC: 108 MMOL/L (ref 94–109)
CO2 SERPL-SCNC: 27 MMOL/L (ref 20–32)
CREAT SERPL-MCNC: 1.53 MG/DL (ref 0.52–1.04)
DIFFERENTIAL METHOD BLD: ABNORMAL
EOSINOPHIL # BLD AUTO: 0.2 10E9/L (ref 0–0.7)
EOSINOPHIL NFR BLD AUTO: 1.5 %
ERYTHROCYTE [DISTWIDTH] IN BLOOD BY AUTOMATED COUNT: 13.7 % (ref 10–15)
GFR SERPL CREATININE-BSD FRML MDRD: 33 ML/MIN/1.7M2
GLUCOSE SERPL-MCNC: 103 MG/DL (ref 70–99)
HCT VFR BLD AUTO: 30 % (ref 35–47)
HGB BLD-MCNC: 9.9 G/DL (ref 11.7–15.7)
IMM GRANULOCYTES # BLD: 0 10E9/L (ref 0–0.4)
IMM GRANULOCYTES NFR BLD: 0.3 %
INTERPRETATION ECG - MUSE: NORMAL
LYMPHOCYTES # BLD AUTO: 4.6 10E9/L (ref 0.8–5.3)
LYMPHOCYTES NFR BLD AUTO: 39.2 %
MCH RBC QN AUTO: 33.9 PG (ref 26.5–33)
MCHC RBC AUTO-ENTMCNC: 33 G/DL (ref 31.5–36.5)
MCV RBC AUTO: 103 FL (ref 78–100)
MONOCYTES # BLD AUTO: 1.1 10E9/L (ref 0–1.3)
MONOCYTES NFR BLD AUTO: 9.4 %
NEUTROPHILS # BLD AUTO: 5.8 10E9/L (ref 1.6–8.3)
NEUTROPHILS NFR BLD AUTO: 49.4 %
NRBC # BLD AUTO: 0 10*3/UL
NRBC BLD AUTO-RTO: 0 /100
PLATELET # BLD AUTO: 164 10E9/L (ref 150–450)
POTASSIUM SERPL-SCNC: 4.5 MMOL/L (ref 3.4–5.3)
RBC # BLD AUTO: 2.92 10E12/L (ref 3.8–5.2)
SODIUM SERPL-SCNC: 140 MMOL/L (ref 133–144)
WBC # BLD AUTO: 11.8 10E9/L (ref 4–11)

## 2018-10-12 PROCEDURE — 85025 COMPLETE CBC W/AUTO DIFF WBC: CPT | Performed by: EMERGENCY MEDICINE

## 2018-10-12 PROCEDURE — 96374 THER/PROPH/DIAG INJ IV PUSH: CPT

## 2018-10-12 PROCEDURE — 99285 EMERGENCY DEPT VISIT HI MDM: CPT | Mod: 25

## 2018-10-12 PROCEDURE — 70450 CT HEAD/BRAIN W/O DYE: CPT

## 2018-10-12 PROCEDURE — 96361 HYDRATE IV INFUSION ADD-ON: CPT

## 2018-10-12 PROCEDURE — 25000132 ZZH RX MED GY IP 250 OP 250 PS 637: Performed by: EMERGENCY MEDICINE

## 2018-10-12 PROCEDURE — 73560 X-RAY EXAM OF KNEE 1 OR 2: CPT | Mod: 50

## 2018-10-12 PROCEDURE — 72125 CT NECK SPINE W/O DYE: CPT

## 2018-10-12 PROCEDURE — 25000128 H RX IP 250 OP 636: Performed by: EMERGENCY MEDICINE

## 2018-10-12 PROCEDURE — 93005 ELECTROCARDIOGRAM TRACING: CPT

## 2018-10-12 PROCEDURE — 80048 BASIC METABOLIC PNL TOTAL CA: CPT | Performed by: EMERGENCY MEDICINE

## 2018-10-12 RX ORDER — ONDANSETRON 2 MG/ML
4 INJECTION INTRAMUSCULAR; INTRAVENOUS ONCE
Status: COMPLETED | OUTPATIENT
Start: 2018-10-12 | End: 2018-10-12

## 2018-10-12 RX ORDER — HYDROCODONE BITARTRATE AND ACETAMINOPHEN 5; 325 MG/1; MG/1
1 TABLET ORAL ONCE
Status: COMPLETED | OUTPATIENT
Start: 2018-10-12 | End: 2018-10-12

## 2018-10-12 RX ADMIN — ONDANSETRON 4 MG: 2 INJECTION INTRAMUSCULAR; INTRAVENOUS at 14:00

## 2018-10-12 RX ADMIN — SODIUM CHLORIDE 1000 ML: 9 INJECTION, SOLUTION INTRAVENOUS at 12:37

## 2018-10-12 RX ADMIN — HYDROCODONE BITARTRATE AND ACETAMINOPHEN 1 TABLET: 5; 325 TABLET ORAL at 14:00

## 2018-10-12 ASSESSMENT — ENCOUNTER SYMPTOMS
BACK PAIN: 0
NECK STIFFNESS: 0
JOINT SWELLING: 1
NECK PAIN: 0
ARTHRALGIAS: 1
SEIZURES: 0

## 2018-10-12 NOTE — DISCHARGE INSTRUCTIONS
Fall with Uncertain Cause  You have had a fall today. But the cause of your fall is not certain. Falls can happen due to slipping, tripping or losing your balance. A fall can also happen from a fainting spell or seizure.  While a fall can happen for a simple reason (tripping over something), falls in elderly people are often caused by a combination of things:    Age-related decline in function with worsening balance, stability, vision, and muscle strength    Chronic illness, such as heart arrhythmias, heart valve disease, vascular disease, COPD, diabetes, strokes, or arthritis    Shoes that do not give much support and make you prone to slip or slide    Anemia or low blood pressure     Effects or side effects of medicines    Dehydration or recent use of alcohol    Environmental hazards, such as uneven or slippery ground, unfamiliar place, obstacles, uneven surfaces, or slippery ground    Situational factors (related to the activity being done, such as rushing to the bathroom)  Because the cause of your fall today is not certain, it is possible that a fainting spell or seizure was the cause. This means that it could happen again, without warning. If you fall again, without a cause, then you should return to this facility promptly to have further tests. Otherwise, follow up with your healthcare provider as explained below.  It is normal to feel sore and tight in your muscles and back the next day, and not just the muscles you initially injured. Remember, all the parts of your body are connected, so while initially one area hurts, the next day another may hurt. Also, when you injure yourself, it causes inflammation, which then causes the muscles to tighten up and hurt more. After the initial worsening, it should gradually improve over the next few days. However, more severe pain should be reported.  Even without a definite head injury, you can still get a concussion. Concussions and even bleeding can still happen,  especially if you have had a recent injury or take blood thinner medicine. It is not unusual to have a mild headache and feel tired and even nauseous or dizzy.  Home care    Rest today and resume your normal activities as soon as you are feeling back to normal. It is best to remain with someone who can check on you for the next 24 hours to watch for another episode of falling.    If you were injured during the fall, follow the advice from your healthcare provider regarding care of your injury.    If you become lightheaded or dizzy, lie down right away or sit and lean forward with your head down.    As a precaution, don't drive a car or operate dangerous equipment, don't take a bath alone (use a shower instead), and don't swim alone until you see your healthcare provider. A condition causing fainting or seizures must be ruled out before resuming these activities.    You may use acetaminophen or ibuprofen to control pain, unless another pain medicine was prescribed. If you have chronic liver or kidney disease or ever had a stomach ulcer or gastrointestinal bleeding, talk with your healthcare provider before using these medicines.    Keep your appointments for any further testing that may have been scheduled for you.  Follow-up care  Follow up with your healthcare provider, or as advised.  If X-rays or CT scan were done, you will be notified if there is a change in the reading, especially if it affects treatment.  Call 911  Call 911 if any of these happen:    Trouble breathing    Confused or difficulty arousing    Fainting or loss of consciousness    Rapid or very slow heart rate    Seizure    Difficulty with speech or vision, weakness of an arm or leg    Difficulty walking or talking, loss of balance, numbness or weakness in one side of your body, facial droop  When to seek medical advice  Call your healthcare provider right away if any of these happen:    Another unexplained fall    Dizziness    Severe  headache    Nausea and vomiting    Blood in vomit, stools (black or red color)  Date Last Reviewed: 11/1/2017 2000-2017 [employer] [address] [place] [address], [place] [address]. All rights reserved. This information is not intended as a substitute for professional medical care. Always follow your healthcare professional's instructions.          Treatment for Bone Bruise (Bone Contusion)  A bone bruise is an injury to a bone that is less severe than a bone fracture. Bone bruises are fairly common. They can happen to people of all ages. Any type of bone in your body can get a bone bruise. Other injuries often happen along with a bone bruise, such as damage to nearby ligaments.  Types of treatment  Treatment for a bone bruise may include:    Resting the bone or joint    Putting an ice pack on the area several times a day    Raising the injury above the level of your heart to reduce swelling    Taking medicine to reduce pain and swelling    Wearing a brace or other device to limit movement, if needed  Your doctor may give you advice about your diet. This is because eating a diet that is rich in calcium, vitamin D, and protein can help you heal. Your doctor may ask you to not use certain over-the-counter medicines for pain. Some of these may delay normal bone healing. If you smoke, your doctor will advise you to stop smoking. Smoking can also delay bone healing.  Your health care provider will tell you how long you should avoid putting weight on your bone. Most bone bruises slowly heal over 2 to 4 months. A larger bone bruise may take longer to heal. You may not be able to return to sports activities for weeks or months. If your symptoms don t go away, your health care provider may give you an MRI.  Possible complications of a bone bruise  Most bone bruises heal without any problems. If your bone bruise is very large, your body may have trouble getting blood flow back to the area. This can cause avascular  necrosis of the bone. This leads to death of that part of the bone.     When to call the health care provider  Call your health care provider if your symptoms don t start to get better in a few days. Call him or her right away if you have any severe symptoms, such as a high fever.      Date Last Reviewed: 7/21/2015 2000-2017 [employer]. [address] [place] [id number]. All rights reserved. This information is not intended as a substitute for professional medical care. Always follow your healthcare professional's instructions.

## 2018-10-12 NOTE — ED NOTES
Patient ambulated a short distance in the hallway. Per her daughters patient is always very slow when she walks and they say patient is walking at her baseline.

## 2018-10-12 NOTE — ED PROVIDER NOTES
History     Chief Complaint:  Fall    HPI   Mahogany Blanco is a 78 year old female with history of CKD, hypertension, hyperlipidemia, hypothyroidism, and diabetes among others, who presents for evaluation after a fall. She was at a restaurant and missed the last step, and fell down the concrete steps. Friend at bedside reports that initially she was complaining of left knee pain, but presents concerned mainly for her right knee. No loss of consciousness. No shaking movements surrounding the fall. No nausea or vomiting. No blood thinners. She presents by ambulance with abrasions to her face, bilateral knees, and swelling and pain to her right knee. Denies injuries to her arms. Denies abdominal pain, neck pain, or back pain.     Allergies:  Codeine  Metformin      Medications:    Albuterol  Wellbutrin  Celexa  Levothyroxine  Lisinopril  Omeprazole  Pravastatin     Past Medical History:    Actinic keratosis  Anemia  B12 deficiency  Cancer  Carpal tunnel syndrome  Chronic kidney disease stage 3, GFR 30-59  Elevated antinuclear antibody level  Essential hypertension, benign  Gout  Hyperlipidemia  Lung nodule  Major depression  Monoclonal gammopathy of unknown significance  Morbid obesity  Thrombocytopenia  Type 2 diabetes mellitus  Cataract  Hypothyroidism  Sleep apnea    Past Surgical History:    Bone marrow biopsy trocar  Ventral hernia repair  Cholecystectomy  Umbilical hernia repair  Right carpal tunnel syndrome repair  Left cataract extraction    Family History:    Cancer  Liver disease  Rheumatoid arthritis    Social History:  Relationship status:   Tobacco use: Former, quit date 1996  Alcohol use: Yes, rarely.  The patient presents by ambulance with her daughters and friend.    Marital Status:   [5]     Review of Systems   Musculoskeletal: Positive for arthralgias (bilateral knees) and joint swelling. Negative for back pain, neck pain and neck stiffness.   Neurological: Negative for seizures and  "syncope.   All other systems reviewed and are negative.    Physical Exam     Patient Vitals for the past 24 hrs:   BP Temp Temp src Heart Rate Resp SpO2 Height Weight   10/12/18 1421 - - - 66 11 96 % - -   10/12/18 1420 118/52 - - - - - - -   10/12/18 1400 129/54 - - 70 12 98 % - -   10/12/18 1230 109/48 - - 72 13 97 % - -   10/12/18 1213 133/79 98.3  F (36.8  C) Oral 74 16 99 % 1.626 m (5' 4\") 117.9 kg (260 lb)        Physical Exam  Vitals: reviewed by me  General: Pt seen on hospital [facility], [facility], cooperative, and alert to conversation  Eyes: Tracking well, clear conjunctiva BL  ENT: MMM, midline trachea.  No midline tenderness to neck, full range of motions neck with no paresthesias.  Does have scant superficial abrasions to nose, no pain to palpation of the bones of facial prominence.  No significant lacerations noted per  Lungs:   No tachypnea, no accessory muscle use. No respiratory distress.   CV: Rate as above, regular rhythm.    Abd: Soft, non tender, no guarding, no rebound. Non distended  MSK: no peripheral edema or joint effusion.  No evidence of trauma apart from some swelling and redness to right knee.  Full range of motion to bilateral hips, knees, ankles.  Intimal tenderness to right knee only, left knee with no tenderness.  No skin breaks noted.  Skin: No rash, normal turgor and temperature  Neuro: Clear speech and no facial droop.  Bilateral upper and bilateral lower extremities with sensation intact light touch and 5 out of 5 motor throughout.  Cranial nerves II through XII are intact bilaterally.  Ambulatory with strong and steady gait.  Psych: Not RIS, no e/o AH/VH      Emergency Department Course     ECG:   ECG taken at 1502, ECG read at 1504  Normal sinus rhythm  Low voltage QRS  Possible inferior infarct, age undetermined  Cannot rule out anterior infarct, age undetermined  Abnormal ECG  Rate 74 bpm. MT interval 178. QRS duration 82. QT/QTc 362/401. P-R-T axes *  -12  229. "     Imaging:  Radiology findings were communicated with the patient and family who voiced understanding of the findings.  XR Knee Bilateral 1/2 views  IMPRESSION:  No fracture identified.   Reading per radiology.     CT head w/o contrast  IMPRESSION:  Diffuse cerebral volume loss and cerebral white matter  changes consistent with chronic small vessel ischemic disease. No  evidence for acute intracranial pathology.  Reading per radiology.     Cervical spine CT w/o contrast  IMPRESSION: Diffuse degenerative changes of the cervical spine. No  evidence for fracture or traumatic malalignment.  Reading per radiology.     Laboratory:  Laboratory findings were communicated with the patient and family who voiced understanding of the findings.  CBC: WBC: 11.8(H), RBC: 2.92(L), HGB: 9.9(L), HCT: 30.0(L), MCV: 103(H), MCH: 33.9(H) o/w WNL ()  BMP: Glucose: 103(H), Creatinine: 1.53(H), GFR: 33(L) o/w WNL     Interventions:  1237 Normal Saline 1000 mL IV   1400 Norco 1 tablet Oral  1400 Zofran 4 mg IV     Emergency Department Course:  Nursing notes and vitals reviewed.  I performed an exam of the patient as documented above.   IV was inserted and blood was drawn for laboratory testing, results above.   The patient was sent for a XR while in the emergency department, results above.    EKG obtained in the ED, see results above.      1441: I updated the patient.   1449: The patient ambulated well per her baseline, she was able to bear weight on both legs.     Findings and plan explained to the patient. Patient discharged home with instructions regarding supportive care, medications, and reasons to return. The importance of close follow-up was reviewed.      I personally reviewed the imaging results with the patient and answered all related questions prior to discharge.    Impression & Plan      Medical Decision Making:  Mahogany Blanco is a 78 year old female who presents to the emergency department today after a fall down  two stairs. Reassuringly, her imaging is negative, including CT scan of the head, and xray's of her knees. She is clinically cleared by [facility] for any type of neck injury. She is mentating appropriately now several hours after the fall, and has a robust neurologic exam. She has some scratches over her nose and extremities, but these are very superficial and do not require any lac repair. She is not on any blood thinners, has wonderful family support her in the ER, and states she feels comfortable going home knowing the red flags for when to come back to the emergency room. Family is okay with this plan as well as patient herself. Will discharge as above.     Diagnosis:    ICD-10-CM    1. Fall, initial encounter W19.XXXA    2. Skin abrasion T14.8XXA       Disposition:   Discharged to home    CMS Diagnoses: None     Discharge Medications:  New Prescriptions    No medications on file     Scribe Disclosure:  I, Diana Malagon, am serving as a scribe at 12:35 PM on 10/12/2018 to document services personally performed by Wilian Morales MD, based on my observations and the provider's statements to me.     Diana Malagon  10/12/2018    EMERGENCY DEPARTMENT       Wilian Morales MD  10/12/18 [id number]

## 2018-10-12 NOTE — ED AVS SNAPSHOT
Emergency Department    [address] [facility] [address]    Phone:  [phone]    Fax:  [phone]                                       Mahogany Blanco   MRN: [id number]    Department:   Emergency Department   Date of Visit:  10/12/2018           Patient Information     Date Of Birth          1940        Your diagnoses for this visit were:     Fall, initial encounter     Skin abrasion        You were seen by Wilian Morales MD.      Follow-up Information     Follow up with Aquilino Lilly MD. Schedule an appointment as soon as possible for a visit in 3 days.    Specialty:  Internal Medicine    Why:  For repeat evaluation and symptom check    Contact information:    [address] [facility] [address] [phone]          Follow up with  Emergency Department.    Specialty:  EMERGENCY MEDICINE    Why:  If symptoms worsen    Contact information:    [address] [facility] [address] [phone]        Discharge Instructions         Fall with Uncertain Cause  You have had a fall today. But the cause of your fall is not certain. Falls can happen due to slipping, tripping or losing your balance. A fall can also happen from a fainting spell or seizure.  While a fall can happen for a simple reason (tripping over something), falls in elderly people are often caused by a combination of things:    Age-related decline in function with worsening balance, stability, vision, and muscle strength    Chronic illness, such as heart arrhythmias, heart valve disease, vascular disease, COPD, diabetes, strokes, or arthritis    Shoes that do not give much support and make you prone to slip or slide    Anemia or low blood pressure     Effects or side effects of medicines    Dehydration or recent use of alcohol    Environmental hazards, such as uneven or slippery ground, unfamiliar place, obstacles, uneven surfaces, or slippery ground    Situational factors (related  to the activity being done, such as rushing to the bathroom)  Because the cause of your fall today is not certain, it is possible that a fainting spell or seizure was the cause. This means that it could happen again, without warning. If you fall again, without a cause, then you should return to this facility promptly to have further tests. Otherwise, follow up with your healthcare provider as explained below.  It is normal to feel sore and tight in your muscles and back the next day, and not just the muscles you initially injured. Remember, all the parts of your body are connected, so while initially one area hurts, the next day another may hurt. Also, when you injure yourself, it causes inflammation, which then causes the muscles to tighten up and hurt more. After the initial worsening, it should gradually improve over the next few days. However, more severe pain should be reported.  Even without a definite head injury, you can still get a concussion. Concussions and even bleeding can still happen, especially if you have had a recent injury or take blood thinner medicine. It is not unusual to have a mild headache and feel tired and even nauseous or dizzy.  Home care    Rest today and resume your normal activities as soon as you are feeling back to normal. It is best to remain with someone who can check on you for the next 24 hours to watch for another episode of falling.    If you were injured during the fall, follow the advice from your healthcare provider regarding care of your injury.    If you become lightheaded or dizzy, lie down right away or sit and lean forward with your head down.    As a precaution, don't drive a car or operate dangerous equipment, don't take a bath alone (use a shower instead), and don't swim alone until you see your healthcare provider. A condition causing fainting or seizures must be ruled out before resuming these activities.    You may use acetaminophen or ibuprofen to control pain,  unless another pain medicine was prescribed. If you have chronic liver or kidney disease or ever had a stomach ulcer or gastrointestinal bleeding, talk with your healthcare provider before using these medicines.    Keep your appointments for any further testing that may have been scheduled for you.  Follow-up care  Follow up with your healthcare provider, or as advised.  If X-rays or CT scan were done, you will be notified if there is a change in the reading, especially if it affects treatment.  Call 911  Call 911 if any of these happen:    Trouble breathing    Confused or difficulty arousing    Fainting or loss of consciousness    Rapid or very slow heart rate    Seizure    Difficulty with speech or vision, weakness of an arm or leg    Difficulty walking or talking, loss of balance, numbness or weakness in one side of your body, facial droop  When to seek medical advice  Call your healthcare provider right away if any of these happen:    Another unexplained fall    Dizziness    Severe headache    Nausea and vomiting    Blood in vomit, stools (black or red color)  Date Last Reviewed: 11/1/2017 2000-2017 [employer]. [address] [place] [address]. All rights reserved. This information is not intended as a substitute for professional medical care. Always follow your healthcare professional's instructions.          Treatment for Bone Bruise (Bone Contusion)  A bone bruise is an injury to a bone that is less severe than a bone fracture. Bone bruises are fairly common. They can happen to people of all ages. Any type of bone in your body can get a bone bruise. Other injuries often happen along with a bone bruise, such as damage to nearby ligaments.  Types of treatment  Treatment for a bone bruise may include:    Resting the bone or joint    Putting an ice pack on the area several times a day    Raising the injury above the level of your heart to reduce swelling    Taking medicine to reduce  pain and swelling    Wearing a brace or other device to limit movement, if needed  Your doctor may give you advice about your diet. This is because eating a diet that is rich in calcium, vitamin D, and protein can help you heal. Your doctor may ask you to not use certain over-the-counter medicines for pain. Some of these may delay normal bone healing. If you smoke, your doctor will advise you to stop smoking. Smoking can also delay bone healing.  Your health care provider will tell you how long you should avoid putting weight on your bone. Most bone bruises slowly heal over 2 to 4 months. A larger bone bruise may take longer to heal. You may not be able to return to sports activities for weeks or months. If your symptoms don t go away, your health care provider may give you an MRI.  Possible complications of a bone bruise  Most bone bruises heal without any problems. If your bone bruise is very large, your body may have trouble getting blood flow back to the area. This can cause avascular necrosis of the bone. This leads to death of that part of the bone.     When to call the health care provider  Call your health care provider if your symptoms don t start to get better in a few days. Call him or her right away if you have any severe symptoms, such as a high fever.      Date Last Reviewed: 7/21/2015 2000-2017 [employer] [address] [place] [address]. All rights reserved. This information is not intended as a substitute for professional medical care. Always follow your healthcare professional's instructions.          Your next 10 appointments already scheduled     Oct 17, 2018  9:00 AM CDT   Office Visit with Georgina Taylor PA-C   [facility]    [address] [address] [phone]           Bring a current list of meds and any records pertaining to this visit. For Physicals, please bring  immunization records and any forms needing to be filled out. Please arrive 10 minutes early to complete paperwork.              24 Hour Appointment Hotline       To make an appointment at any [facility] call [phone] ([phone]). If you don't have a family doctor or clinic, we will help you find one. [facility] clinics are conveniently located to serve the needs of you and your family.             Review of your medicines      Our records show that you are taking the medicines listed below. If these are incorrect, please call your family doctor or clinic.        Dose / Directions Last dose taken    albuterol 108 (90 Base) MCG/ACT inhaler   Commonly known as:  VENTOLIN HFA   Quantity:  18 g        INHALE 2 PUFFS BY MOUTH INTO THE LUNGS EVERY 4 HOURS AS NEEDED FOR SHORTNESS OF BREATH OR WHEEZING.   Refills:  11        B-12 1000 MCG Tbcr   Dose:  1000 mcg   Quantity:  90 tablet        Take 1,000 mcg by mouth daily   Refills:  3        blood glucose monitoring lancets        Use to test blood sugar once daily or as directed.   Refills:  0        blood glucose monitoring meter device kit   Commonly known as:  no brand specified   Quantity:  1 kit        Use to test blood sugar one  time daily or as directed.   Refills:  0        * blood glucose monitoring test strip   Commonly known as:  ACCU-CHEK NOELLE   Quantity:  50 each        Use to test blood sugars 1 time daily or as directed.   Refills:  11        * blood glucose monitoring test strip   Commonly known as:  no brand specified   Quantity:  100 strip        Use to test blood sugars one time daily or as directed   Refills:  3        buPROPion 150 MG 24 hr tablet   Commonly known as:  WELLBUTRIN XL   Quantity:  30 tablet        TAKE 1 TABLET(150 MG) BY MOUTH EVERY MORNING   Refills:  4        citalopram 20 MG tablet   Commonly known as:  celeXA   Quantity:  30 tablet        TAKE 1 TABLET(20 MG) BY MOUTH DAILY   Refills:  2        fluticasone 50 MCG/ACT  spray   Commonly known as:  FLONASE   Dose:  1-2 spray   Quantity:  16 g        Spray 1-2 sprays into both nostrils daily   Refills:  2        levothyroxine 88 MCG tablet   Commonly known as:  SYNTHROID/LEVOTHROID   Dose:  88 mcg   Quantity:  90 tablet        Take 1 tablet (88 mcg) by mouth daily   Refills:  3        lisinopril 40 MG tablet   Commonly known as:  PRINIVIL/ZESTRIL   Quantity:  90 tablet        TAKE 1 TABLET(40 MG) BY MOUTH DAILY   Refills:  3        omeprazole 20 MG CR capsule   Commonly known as:  priLOSEC   Quantity:  90 capsule        TAKE 1 CAPSULE(20 MG) BY MOUTH DAILY   Refills:  2        order for DME   Quantity:  1 Device        Disp one glucometer that is covered by insurance with 1 RF. Also disp glucometer strips and lancets to check sugars daily. Disp 100 strips and  Lancets  with prn RFs for 1 year   Refills:  11        order for DME   Quantity:  1 each        Equipment being ordered:   AeroChamber (or similar device for inhaler use)   Refills:  0        pravastatin 40 MG tablet   Commonly known as:  PRAVACHOL   Quantity:  30 tablet        TAKE 1 TABLET BY MOUTH DAILY AT BEDTIME   Refills:  8        vitamin D 2000 units tablet   Dose:  2000 Units   Quantity:  100 tablet        Take 2,000 Units by mouth daily   Refills:  3        * Notice:  This list has 2 medication(s) that are the same as other medications prescribed for you. Read the directions carefully, and ask your doctor or other care provider to review them with you.            Procedures and tests performed during your visit     Basic metabolic panel    CBC with platelets differential    CT Head w/o Contrast    Cervical spine CT w/o contrast    EKG 12-lead, tracing only    XR Knee Bilateral 1/2 Views      Orders Needing Specimen Collection     None      Pending Results     Date and Time Order Name Status Description    10/12/2018 1232 XR Knee Bilateral 1/2 Views Preliminary             Pending Culture Results     No orders found  from 10/10/2018 to 10/13/2018.            Pending Results Instructions     If you had any lab results that were not finalized at the time of your Discharge, you can call the ED Lab Result RN at [phone]. You will be contacted by this team for any positive Lab results or changes in treatment. The nurses are available 7 days a week from 10A to 6:30P.  You can leave a message 24 hours per day and they will return your call.        Test Results From Your Hospital Stay        10/12/2018  1:59 PM      Narrative     CT OF THE HEAD WITHOUT CONTRAST 10/12/2018 1:10 PM     COMPARISON: Head CT 8/20/2018.    HISTORY: Trauma.     TECHNIQUE: 5 mm thick axial CT images of the head were acquired  without IV contrast material.    FINDINGS:  There is mild diffuse cerebral volume loss. There are  subtle patchy areas of decreased density in the cerebral white matter  bilaterally that are consistent with sequela of chronic small vessel  ischemic disease.    The ventricles and basal cisterns are within normal limits in  configuration given the degree of cerebral volume loss.  There is no  midline shift. There are no extra-axial fluid collections.    No intracranial hemorrhage, mass or recent infarct.    The visualized paranasal sinuses are well-aerated. There is no  mastoiditis. There are no fractures of the visualized bones.        Impression     IMPRESSION:  Diffuse cerebral volume loss and cerebral white matter  changes consistent with chronic small vessel ischemic disease. No  evidence for acute intracranial pathology.      Radiation dose for this scan was reduced using automated exposure  control, adjustment of the mA and/or kV according to patient size, or  iterative reconstruction technique.    RANDAL MEDINA MD         10/12/2018  1:59 PM      Narrative     CT OF THE CERVICAL SPINE WITHOUT CONTRAST   10/12/2018 1:10 PM     COMPARISON: CT cervical spine 8/20/2018.    HISTORY: Trauma.      TECHNIQUE: Axial images of the  cervical spine were acquired without  intravenous contrast. Multiplanar reformations were created.      FINDINGS: There is normal alignment of the cervical vertebrae;  however, there is straightening of normal cervical lordosis. Vertebral  body heights of the cervical spine are normal. Craniocervical  alignment is normal. There are no fractures of the cervical spine.  Degenerative endplate spurring at the C6-C7 level again noted without  change. Moderate-severe facet arthropathy throughout the cervical  spine again noted without change. Moderate degenerative spinal canal  narrowing at the C6-C7 level again noted without change. There is no  prevertebral soft tissue swelling.        Impression     IMPRESSION: Diffuse degenerative changes of the cervical spine. No  evidence for fracture or traumatic malalignment.    Radiation dose for this scan was reduced using automated exposure  control, adjustment of the mA and/or kV according to patient size, or  iterative reconstruction technique.    RANDAL MEDINA MD         10/12/2018 12:48 PM      Component Results     Component Value Ref Range & Units Status    WBC 11.8 (H) 4.0 - 11.0 10e9/L Final    RBC Count 2.92 (L) 3.8 - 5.2 10e12/L Final    Hemoglobin 9.9 (L) 11.7 - 15.7 g/dL Final    Hematocrit 30.0 (L) 35.0 - 47.0 % Final     (H) 78 - 100 fl Final    MCH 33.9 (H) 26.5 - 33.0 pg Final    MCHC 33.0 31.5 - 36.5 g/dL Final    RDW 13.7 10.0 - 15.0 % Final    Platelet Count 164 150 - 450 10e9/L Final    Diff Method Automated Method  Final    % Neutrophils 49.4 % Final    % Lymphocytes 39.2 % Final    % Monocytes 9.4 % Final    % Eosinophils 1.5 % Final    % Basophils 0.2 % Final    % Immature Granulocytes 0.3 % Final    Nucleated RBCs 0 0 /100 Final    Absolute Neutrophil 5.8 1.6 - 8.3 10e9/L Final    Absolute Lymphocytes 4.6 0.8 - 5.3 10e9/L Final    Absolute Monocytes 1.1 0.0 - 1.3 10e9/L Final    Absolute Eosinophils 0.2 0.0 - 0.7 10e9/L Final    Absolute  Basophils 0.0 0.0 - 0.2 10e9/L Final    Abs Immature Granulocytes 0.0 0 - 0.4 10e9/L Final    Absolute Nucleated RBC 0.0  Final         10/12/2018  1:08 PM      Component Results     Component Value Ref Range & Units Status    Sodium 140 133 - 144 mmol/L Final    Potassium 4.5 3.4 - 5.3 mmol/L Final    Chloride 108 94 - 109 mmol/L Final    Carbon Dioxide 27 20 - 32 mmol/L Final    Anion Gap 5 3 - 14 mmol/L Final    Glucose 103 (H) 70 - 99 mg/dL Final    Urea Nitrogen 21 7 - 30 mg/dL Final    Creatinine 1.53 (H) 0.52 - 1.04 mg/dL Final    GFR Estimate 33 (L) >60 mL/min/1.7m2 Final    Non  GFR Calc    GFR Estimate If Black 40 (L) >60 mL/min/1.7m2 Final    African American GFR Calc    Calcium 8.8 8.5 - 10.1 mg/dL Final         10/12/2018  2:28 PM      Narrative     KNEE BILATERAL ONE TO TWO VIEWS  10/12/2018 12:56 PM     HISTORY:  Trauma.     COMPARISON:  2/11/2015 right knee.    FINDINGS: Prominent medial compartment osteoarthritis since again seen  on the right. Chondrocalcinosis, consistent with calcium pyrophosphate  deposition disease.  Mild left knee joint effusion. Left knee medial  compartment spurring.        Impression     IMPRESSION:  No fracture identified.                 Clinical Quality Measure: Blood Pressure Screening     Your blood pressure was checked while you were in the emergency department today. The last reading we obtained was  BP: 115/49 (Simultaneous filing. User may not have seen previous data.) . Please read the guidelines below about what these numbers mean and what you should do about them.  If your systolic blood pressure (the top number) is less than 120 and your diastolic blood pressure (the bottom number) is less than 80, then your blood pressure is normal. There is nothing more that you need to do about it.  If your systolic blood pressure (the top number) is 120-139 or your diastolic blood pressure (the bottom number) is 80-89, your blood pressure may be higher than  it should be. You should have your blood pressure rechecked within a year by a primary care provider.  If your systolic blood pressure (the top number) is 140 or greater or your diastolic blood pressure (the bottom number) is 90 or greater, you may have high blood pressure. High blood pressure is treatable, but if left untreated over time it can put you at risk for heart attack, stroke, or kidney failure. You should have your blood pressure rechecked by a primary care provider within the next 4 weeks.  If your provider in the emergency department today gave you specific instructions to follow-up with your doctor or provider even sooner than that, you should follow that instruction and not wait for up to 4 weeks for your follow-up visit.        Thank you for choosing [facility]       Thank you for choosing [facility] for your care. Our goal is always to provide you with excellent care. Hearing back from our patients is one way we can continue to improve our services. Please take a few minutes to complete the written survey that you may receive in the mail after you visit with us. Thank you!        Care EveryWhere ID     This is your Care EveryWhere ID. This could be used by other organizations to access your [facility] medical records  [id number]        Equal Access to Services     ALEJO ABERNATHY : Ej Woodard, wicho darby, rinku garvin. So [facility] [phone].    ATENCIÓN: Si habla español, tiene a jennings disposición servicios gratuitos de asistencia lingüística. Llame al [phone].    We comply with applicable federal civil rights laws and [place] laws. We do not discriminate on the basis of race, color, national origin, age, disability, sex, sexual orientation, or gender identity.            After Visit Summary       This is your record. Keep this with you and show to your community pharmacist(s) and doctor(s) at your next visit.

## 2018-10-12 NOTE — ED NOTES
Bed: ED20  Expected date:   Expected time:   Means of arrival:   Comments:  gloria - 515 - 78 F fall knee pain/near syncope eta 1200

## 2018-10-12 NOTE — ED AVS SNAPSHOT
Emergency Department    [address] [facility] [address]    Phone:  [phone]    Fax:  [phone]                                       Mahogany Blanco   MRN: [id number]    Department:   Emergency Department   Date of Visit:  10/12/2018           After Visit Summary Signature Page     I have received my discharge instructions, and my questions have been answered. I have discussed any challenges I see with this plan with the nurse or doctor.    ..........................................................................................................................................  Patient/Patient Representative Signature      ..........................................................................................................................................  Patient Representative Print Name and Relationship to Patient    ..................................................               ................................................  Date                                   Time    ..........................................................................................................................................  Reviewed by Signature/Title    ...................................................              ..............................................  Date                                               Time          22EPIC Rev 08/18

## 2018-10-21 VITALS
DIASTOLIC BLOOD PRESSURE: 72 MMHG | SYSTOLIC BLOOD PRESSURE: 114 MMHG | WEIGHT: 244 LBS | OXYGEN SATURATION: 97 % | HEART RATE: 64 BPM | BODY MASS INDEX: 41.88 KG/M2

## 2018-10-29 ENCOUNTER — APPOINTMENT (OUTPATIENT)
Dept: GENERAL RADIOLOGY | Facility: CLINIC | Age: 78
End: 2018-10-29
Attending: EMERGENCY MEDICINE
Payer: COMMERCIAL

## 2018-10-29 ENCOUNTER — OFFICE VISIT (OUTPATIENT)
Dept: URGENT CARE | Facility: URGENT CARE | Age: 78
End: 2018-10-29
Payer: COMMERCIAL

## 2018-10-29 ENCOUNTER — HOSPITAL ENCOUNTER (EMERGENCY)
Facility: CLINIC | Age: 78
Discharge: HOME | End: 2018-10-29
Attending: EMERGENCY MEDICINE | Admitting: EMERGENCY MEDICINE
Payer: COMMERCIAL

## 2018-10-29 ENCOUNTER — APPOINTMENT (OUTPATIENT)
Dept: MRI IMAGING | Facility: CLINIC | Age: 78
End: 2018-10-29
Attending: EMERGENCY MEDICINE
Payer: COMMERCIAL

## 2018-10-29 VITALS
WEIGHT: 260 LBS | RESPIRATION RATE: 12 BRPM | HEIGHT: 64 IN | SYSTOLIC BLOOD PRESSURE: 123 MMHG | HEART RATE: 75 BPM | DIASTOLIC BLOOD PRESSURE: 58 MMHG | OXYGEN SATURATION: 95 % | BODY MASS INDEX: 44.39 KG/M2 | TEMPERATURE: 98.4 F

## 2018-10-29 VITALS
SYSTOLIC BLOOD PRESSURE: 167 MMHG | HEART RATE: 85 BPM | DIASTOLIC BLOOD PRESSURE: 74 MMHG | OXYGEN SATURATION: 94 % | RESPIRATION RATE: 16 BRPM | TEMPERATURE: 97.5 F

## 2018-10-29 DIAGNOSIS — R42 DIZZINESS: ICD-10-CM

## 2018-10-29 DIAGNOSIS — R29.6 FALLS FREQUENTLY: ICD-10-CM

## 2018-10-29 DIAGNOSIS — R51.9 SEVERE HEADACHE: Primary | ICD-10-CM

## 2018-10-29 DIAGNOSIS — D64.9 ANEMIA, UNSPECIFIED TYPE: ICD-10-CM

## 2018-10-29 DIAGNOSIS — N28.9 RENAL INSUFFICIENCY: ICD-10-CM

## 2018-10-29 DIAGNOSIS — R93.89 ABNORMAL CXR: ICD-10-CM

## 2018-10-29 LAB
ALBUMIN SERPL-MCNC: 3.3 G/DL (ref 3.4–5)
ALP SERPL-CCNC: 42 U/L (ref 40–150)
ALT SERPL W P-5'-P-CCNC: 16 U/L (ref 0–50)
ANION GAP SERPL CALCULATED.3IONS-SCNC: 5 MMOL/L (ref 3–14)
AST SERPL W P-5'-P-CCNC: 15 U/L (ref 0–45)
BASOPHILS # BLD AUTO: 0 10E9/L (ref 0–0.2)
BASOPHILS NFR BLD AUTO: 0.2 %
BILIRUB SERPL-MCNC: 0.4 MG/DL (ref 0.2–1.3)
BUN SERPL-MCNC: 16 MG/DL (ref 7–30)
CALCIUM SERPL-MCNC: 8.6 MG/DL (ref 8.5–10.1)
CHLORIDE SERPL-SCNC: 108 MMOL/L (ref 94–109)
CO2 SERPL-SCNC: 25 MMOL/L (ref 20–32)
CREAT SERPL-MCNC: 1.21 MG/DL (ref 0.52–1.04)
DIFFERENTIAL METHOD BLD: ABNORMAL
EOSINOPHIL # BLD AUTO: 0.3 10E9/L (ref 0–0.7)
EOSINOPHIL NFR BLD AUTO: 2.8 %
ERYTHROCYTE [DISTWIDTH] IN BLOOD BY AUTOMATED COUNT: 13.6 % (ref 10–15)
GFR SERPL CREATININE-BSD FRML MDRD: 43 ML/MIN/1.7M2
GLUCOSE SERPL-MCNC: 93 MG/DL (ref 70–99)
HCT VFR BLD AUTO: 30.4 % (ref 35–47)
HGB BLD-MCNC: 9.9 G/DL (ref 11.7–15.7)
IMM GRANULOCYTES # BLD: 0 10E9/L (ref 0–0.4)
IMM GRANULOCYTES NFR BLD: 0.2 %
INTERPRETATION ECG - MUSE: NORMAL
LYMPHOCYTES # BLD AUTO: 4.4 10E9/L (ref 0.8–5.3)
LYMPHOCYTES NFR BLD AUTO: 48.7 %
MCH RBC QN AUTO: 33.6 PG (ref 26.5–33)
MCHC RBC AUTO-ENTMCNC: 32.6 G/DL (ref 31.5–36.5)
MCV RBC AUTO: 103 FL (ref 78–100)
MONOCYTES # BLD AUTO: 0.9 10E9/L (ref 0–1.3)
MONOCYTES NFR BLD AUTO: 9.7 %
NEUTROPHILS # BLD AUTO: 3.4 10E9/L (ref 1.6–8.3)
NEUTROPHILS NFR BLD AUTO: 38.4 %
NRBC # BLD AUTO: 0 10*3/UL
NRBC BLD AUTO-RTO: 0 /100
PLATELET # BLD AUTO: 158 10E9/L (ref 150–450)
POTASSIUM SERPL-SCNC: 4.7 MMOL/L (ref 3.4–5.3)
PROT SERPL-MCNC: 7.7 G/DL (ref 6.8–8.8)
RBC # BLD AUTO: 2.95 10E12/L (ref 3.8–5.2)
SODIUM SERPL-SCNC: 138 MMOL/L (ref 133–144)
TROPONIN I SERPL-MCNC: <0.015 UG/L (ref 0–0.04)
WBC # BLD AUTO: 9.1 10E9/L (ref 4–11)

## 2018-10-29 PROCEDURE — 85025 COMPLETE CBC W/AUTO DIFF WBC: CPT | Performed by: EMERGENCY MEDICINE

## 2018-10-29 PROCEDURE — 25500064 ZZH RX 255 OP 636: Performed by: EMERGENCY MEDICINE

## 2018-10-29 PROCEDURE — A9585 GADOBUTROL INJECTION: HCPCS | Performed by: EMERGENCY MEDICINE

## 2018-10-29 PROCEDURE — 25000128 H RX IP 250 OP 636: Performed by: EMERGENCY MEDICINE

## 2018-10-29 PROCEDURE — 71046 X-RAY EXAM CHEST 2 VIEWS: CPT

## 2018-10-29 PROCEDURE — 80053 COMPREHEN METABOLIC PANEL: CPT | Performed by: EMERGENCY MEDICINE

## 2018-10-29 PROCEDURE — 93005 ELECTROCARDIOGRAM TRACING: CPT

## 2018-10-29 PROCEDURE — 96360 HYDRATION IV INFUSION INIT: CPT | Mod: 59

## 2018-10-29 PROCEDURE — 70553 MRI BRAIN STEM W/O & W/DYE: CPT

## 2018-10-29 PROCEDURE — 99214 OFFICE O/P EST MOD 30 MIN: CPT | Performed by: FAMILY MEDICINE

## 2018-10-29 PROCEDURE — 84484 ASSAY OF TROPONIN QUANT: CPT | Performed by: EMERGENCY MEDICINE

## 2018-10-29 PROCEDURE — 99285 EMERGENCY DEPT VISIT HI MDM: CPT | Mod: 25

## 2018-10-29 RX ORDER — MECLIZINE HYDROCHLORIDE 25 MG/1
25 TABLET ORAL 3 TIMES DAILY PRN
Qty: 20 TABLET | Refills: 0 | Status: SHIPPED | OUTPATIENT
Start: 2018-10-29 | End: 2019-03-16

## 2018-10-29 RX ORDER — GADOBUTROL 604.72 MG/ML
12 INJECTION INTRAVENOUS ONCE
Status: COMPLETED | OUTPATIENT
Start: 2018-10-29 | End: 2018-10-29

## 2018-10-29 RX ADMIN — GADOBUTROL 12 ML: 604.72 INJECTION INTRAVENOUS at 14:14

## 2018-10-29 RX ADMIN — SODIUM CHLORIDE 1000 ML: 9 INJECTION, SOLUTION INTRAVENOUS at 12:52

## 2018-10-29 NOTE — PROGRESS NOTES
SUBJECTIVE: Mahogany Blanco is a 78 year old female presenting with a chief complaint of severe, 8/10 Ha with dizziness and falls.  Onset of symptoms was 2 week(s) ago.  Course of illness is worsening.    Severity moderately severe  Current and Associated symptoms: none  Treatment measures tried include None tried.  Predisposing factors include falls.    Past Medical History:   Diagnosis Date     Actinic keratosis 10/09    treated with liquids NO2     Anemia 5/21/2013     Anemia, unspecified      B12 deficiency 1/28/2018     Cancer (H) Summer 2013    basal cell cancer 2 spots removed     Carpal tunnel syndrome      CKD (chronic kidney disease) stage 3, GFR 30-59 ml/min (H)      Elevated antinuclear antibody (JACINTO) level 9/22/2017     Esophageal reflux      Essential hypertension, benign      Gout 4/29/2015     Gout, unspecified      Hyperlipidemia LDL goal <100       Lung nodule 08/29/2017    left side, 8mm. Needs repeat CT chest Aug 2018     Major depression       MGUS (monoclonal gammopathy of unknown significance) 9/22/2017     Morbid obesity (H) 10/9/2015     Thrombocytopenia (H) 1/28/2018     Type 2 diabetes mellitus with diabetic chronic kidney disease  (goal A1C <7) 10/9/2015     Unspecified cataract 3/03    bilateral     Unspecified hypothyroidism      Unspecified sleep apnea      Vitamin D deficiency 11/12/2012     Allergies   Allergen Reactions     Codeine      Tylenol #3     Metformin      Diarrhea       Social History   Substance Use Topics     Smoking status: Former Smoker     Packs/day: 1.50     Years: 25.00     Quit date: 10/14/1996     Smokeless tobacco: Never Used     Alcohol use Yes      Comment: rare       ROS:  SKIN: no rash  GI: no vomiting    OBJECTIVE:  /74  Pulse 85  Temp 97.5  F (36.4  C) (Oral)  Resp 16  LMP 06/16/1985 (Approximate)  SpO2 94%GENERAL APPEARANCE: healthy, alert and no distress  EYES: EOMI,  PERRL, conjunctiva clear  HENT: ear canals and TM's normal.  Nose and mouth  without ulcers, erythema or lesions  NECK: supple, nontender, no lymphadenopathy  RESP: lungs clear to auscultation - no rales, rhonchi or wheezes  CV: regular rates and rhythm, normal S1 S2, no murmur noted  NEURO: Normal strength and tone, sensory exam grossly normal,  normal speech and mentation  SKIN: no suspicious lesions or rashes      ICD-10-CM    1. Severe headache R51    2. Dizziness R42    3. Falls frequently R29.6      Will go through ED for cont w/u

## 2018-10-29 NOTE — ED PROVIDER NOTES
"  History     Chief Complaint:  Dizziness     HPI   Mahogany Blanco is a 78 year old female with a history of hypertension, hyperlipidemia, type II diabetes mellitus, and chronic kidney disease who presents accompanied by her friend for evaluation of dizziness. On 10/12/2018, the patient was seen here in the ED for evaluation after she fell down a flight of concrete steps. At that time, the patient had an unremarkable workup including head and neck CT, bilateral knee X-rays, EKG, and labs. She was discharged from the ED that day. Approximately a week ago, the patient started to develop dizziness, which she describes as the sensation that \"things are moving\" . Her dizziness is worse with positional changes. This morning, the patient was getting out of bed when she fell backwards into her bed due to her dizziness. She did not strike her head or otherwise injure herself when she fell onto her bed. Due to her persistent dizziness, the patient's friend initially brought her to an urgent but they were referred to the ED for evaluation and management with mention of MRI.  She also complains of some intermittent diarrhea and shortness of breath, but she reports that these are longstanding issues for her that have not been worse recently. Otherwise, she denies any recent fever, cough, chest pain, abdominal pain, nausea, vomiting, dysuria, hematuria, urinary frequency, or unilateral numbness or weakness. Notably, the patient lives alone with her grandson and recently started to use a cane to ambulate.     Cardiac Risk Factors:  CAD:    Negative   Hypertension:   Positive   Hyperlipidemia:  Positive   Diabetes:   Positive   Tobacco use:   Former smoker   Gender:   Female   Age:    78  Familial Hx of CAD:  Negative      Allergies:  Codeine   Metformin      Medications:    albuterol (VENTOLIN HFA) 108 (90 Base) MCG/ACT inhaler  buPROPion (WELLBUTRIN XL) 150 MG 24 hr tablet  Cholecalciferol (VITAMIN D) 2000 UNITS " "tablet  citalopram (CELEXA) 20 MG tablet  Cyanocobalamin (B-12) 1000 MCG TBCR  fluticasone (FLONASE) 50 MCG/ACT nasal spray  levothyroxine (SYNTHROID/LEVOTHROID) 88 MCG tablet  lisinopril (PRINIVIL/ZESTRIL) 40 MG tablet  omeprazole (PRILOSEC) 20 MG CR capsule  pravastatin (PRAVACHOL) 40 MG tablet     Past Medical History:    Actinic keratosis   Anemia  B12 deficiency  History of cancer  Carpal tunnel syndrome  CKD, stage 3   Elevated antinuclear antibody level  Esophageal reflux  Hypertension   Gout   Hyperlipidemia   Lung nodule   Major depression  MGUS  Morbid obesity  Diabetes mellitus, type II   Cataract  Hypothyroidism  Sleep apnea  Vitamin D deficiency     Past Surgical History:    Bone marrow biopsy   Ventral hernia repair  Cholecystectomy  Umbilical hernia repair  Right carpal tunnel syndrome repair  Left cataract extraction  Cholecystectomy     Family History:    Cancer, lung - Mother   Cancer, colon - Sister and father   Cancer, left leg - Sister   Cancer, skin - Sister   Liver disease - Sister   Rheumatoid arthritis - Daughter     Social History:  Tobacco use:    Former smoker - 1.50 packs / day for 25 years, quit 1996   Alcohol use:    Positive, rarely   Marital status:       Accompanied to ED by:  Friend    The patient lives with her grandson in a private residence.     Review of Systems   All other systems reviewed and are negative.    Physical Exam   First Vitals:  BP: 148/58  Pulse: 75  Heart Rate: 75  Temp: 98.4  F (36.9  C)  Resp: 22  Height: 162.6 cm (5' 4\")  Weight: 117.9 kg (260 lb)  SpO2: 96 %  Lying Orthostatic BP: 108/57  Lying Orthostatic Pulse: 68 bpm  Standing Orthostatic BP: 98/44  Standing Orthostatic Pulse: 89 bpm      Physical Exam  General: woman sitting upright in room 20, female  at bedside  HENT: mucous membranes somewhat dry, TMs clear  Eyes: PERRL, EOMI, no nystagmus  CV: regular rate, regular rhythm  Resp: clear throughout, normal effort  GI: abdomen soft and " "nontender, no guarding  MSK: no bony tenderness, mastoids nontender  Skin: appropriately warm and dry, no facial rash  Neuro: awake, alert, clear speech, fully oriented, face symmetric,  normal, strength and sensation intact in all extr, no meningismus, ambulation not initially tested  Psych: anxious, cooperative    Emergency Department Course   ECG (12:54:05):  Indication: Dizziness   Rate 67 bpm. WA interval 168 ms. QRS duration 82 ms. QT/QTc 398/420 ms. P-R-T axes * -11 19.   Interpretation: Normal sinus rhythm, Normal ECG   Agree with computer interpretation. Yes    Interpreted at 1256 by Dr. Avendaño.      Imaging:  Radiographic findings were communicated with the patient who voiced understanding of the findings.    MR Brain w/o and w Contrast:  IMPRESSION:    1. No evidence of acute infarct, mass, hemorrhage, or herniation.  2. Mild diffuse parenchymal volume loss and white matter changes likely due to chronic microvascular ischemic disease.    Per radiology.     XR Chest:  IMPRESSION: Lower lobe infiltrate of uncertain laterality best seen on lateral view.  Preliminary report per radiology.     Laboratory:  CBC: HGB 9.9 low, o/w WNL (WBC 9.1, )  CMP: Creatinine 1.21 high, GFR Estimate 43 low, Albumin 3.3 low, o/w WNL   Troponin I 1220: <0.015      Interventions:  1252 NS 1,000 mL IV     Emergency Department Course:  Nursing notes and vitals reviewed.  1214: I performed an exam of the patient as documented above.     I performed electronic chart review in [facility].  The patient was placed on continuous cardiac and pulse ox monitoring.    1301: I updated and reassessed the patient.     1437: I updated and reassessed the patient.     The patient passed a \"road test\" prior to discharge from the ED.    Findings and plan explained to the Patient. Patient discharged home with instructions regarding supportive care, medications, and reasons to return. The importance of close follow-up was reviewed. "     Impression & Plan      Medical Decision Making:  The cause of her presenting dizziness is unconfirmed and likely multifactorial.  Despite repeated questioning, it is very difficult to pin down whether this was truly vertigo or lightheadedness or perhaps some combination.  Workup was performed for central causes such as ischemic stroke brain tumor or intracranial hemorrhage which is fortunately benign.  I do not think she requires lumbar puncture.  Laboratory studies are notable for her baseline anemia and renal insufficiency without abrupt change.  Vital signs are satisfactory.  I do note that her orthostatic vital signs were slightly abnormal, though she did not have corresponding dizziness.  She was given IV fluids.  I discussed with her her abnormal chest x-ray.  She does not have acute respiratory symptoms, fever, leukocytosis to make a more compelling case for an acute pneumonia or any other acute disease.  Do not think this is CHF exacerbation.  I emphasized the need for close follow-up for this to graphic finding as well as her presentation overall.  She passed a road test and declined my offer of hospitalization for further monitoring and testing here in the hospital, citing that she felt well enough to manage at home.  We discussed strict return precautions and the importance of close follow-up through clinic.    Diagnosis:    ICD-10-CM    1. Dizziness R42    2. Renal insufficiency N28.9    3. Anemia, unspecified type D64.9     unchanged since earlier this month   4. Abnormal CXR R93.89        Disposition:  Discharged to home with Meclizine.     Discharge Medications:  New Prescriptions    MECLIZINE (ANTIVERT) 25 MG TABLET    Take 1 tablet (25 mg) by mouth 3 times daily as needed for dizziness       This record was created at least in part using electronic voice recognition software, so please excuse any typographical errors.    I, Leonard Mathur, am serving as a scribe at 12:14 PM on 10/29/2018 to  document services personally performed by Dr. Avendaño, based on my observations and the provider's statements to me.     EMERGENCY DEPARTMENT       Julián Avendaño MD  10/30/18 7934

## 2018-10-29 NOTE — ED NOTES
Patient reports dizziness when sitting up.  Patient also c/o headache that has been present for a couple weeks.

## 2018-10-29 NOTE — ED AVS SNAPSHOT
Emergency Department    [address] [facility] [address]    Phone:  [phone]    Fax:  [phone]                                       Mahogany Blanco   MRN: [id number]    Department:   Emergency Department   Date of Visit:  10/29/2018           Patient Information     Date Of Birth          1940        Your diagnoses for this visit were:     Dizziness     Renal insufficiency     Anemia, unspecified type unchanged since earlier this month    Abnormal CXR        You were seen by Julián Avendaño MD.      Follow-up Information     Follow up with Aquilino Lilly MD. Call in 1 day.    Specialty:  Internal Medicine    Contact information:    [address] [address] [phone]          Follow up with  Emergency Department.    Specialty:  EMERGENCY MEDICINE    Why:  As needed, If symptoms worsen    Contact information:    [address] [facility] [address] [phone]      Discharge References/Attachments     DIZZINESS, UNCERTAIN CAUSE (ENGLISH)      Your next 10 appointments already scheduled     Nov 13, 2018  4:30 PM CST   Office Visit with Aquilino Lilly MD   [facility] ([facility]    [address] [address] [phone]           Bring a current list of meds and any records pertaining to this visit. For Physicals, please bring immunization records and any forms needing to be filled out. Please arrive 10 minutes early to complete paperwork.              24 Hour Appointment Hotline       To make an appointment at any [facility], call [phone] ([phone]). If you don't have a family doctor or clinic, we will help you find one. [facility] are conveniently located to serve the needs of you and your family.             Review of your medicines      START taking        Dose / Directions Last dose taken    meclizine 25 MG tablet   Commonly known as:  ANTIVERT    Dose:  25 mg   Quantity:  20 tablet        Take 1 tablet (25 mg) by mouth 3 times daily as needed for dizziness   Refills:  0          Our records show that you are taking the medicines listed below. If these are incorrect, please call your family doctor or clinic.        Dose / Directions Last dose taken    albuterol 108 (90 Base) MCG/ACT inhaler   Commonly known as:  VENTOLIN HFA   Quantity:  18 g        INHALE 2 PUFFS BY MOUTH INTO THE LUNGS EVERY 4 HOURS AS NEEDED FOR SHORTNESS OF BREATH OR WHEEZING.   Refills:  11        B-12 1000 MCG Tbcr   Dose:  1000 mcg   Quantity:  90 tablet        Take 1,000 mcg by mouth daily   Refills:  3        blood glucose monitoring lancets        Use to test blood sugar once daily or as directed.   Refills:  0        blood glucose monitoring meter device kit   Commonly known as:  no brand specified   Quantity:  1 kit        Use to test blood sugar one  time daily or as directed.   Refills:  0        * blood glucose monitoring test strip   Commonly known as:  ACCU-CHEK NOELLE   Quantity:  50 each        Use to test blood sugars 1 time daily or as directed.   Refills:  11        * blood glucose monitoring test strip   Commonly known as:  no brand specified   Quantity:  100 strip        Use to test blood sugars one time daily or as directed   Refills:  3        buPROPion 150 MG 24 hr tablet   Commonly known as:  WELLBUTRIN XL   Quantity:  30 tablet        TAKE 1 TABLET(150 MG) BY MOUTH EVERY MORNING   Refills:  4        citalopram 20 MG tablet   Commonly known as:  celeXA   Quantity:  30 tablet        TAKE 1 TABLET(20 MG) BY MOUTH DAILY   Refills:  2        fluticasone 50 MCG/ACT spray   Commonly known as:  FLONASE   Dose:  1-2 spray   Quantity:  16 g        Spray 1-2 sprays into both nostrils daily   Refills:  2        levothyroxine 88 MCG tablet   Commonly known as:  SYNTHROID/LEVOTHROID   Dose:  88 mcg   Quantity:  90 tablet        Take 1 tablet (88 mcg) by mouth daily   Refills:   3        lisinopril 40 MG tablet   Commonly known as:  PRINIVIL/ZESTRIL   Quantity:  90 tablet        TAKE 1 TABLET(40 MG) BY MOUTH DAILY   Refills:  3        omeprazole 20 MG CR capsule   Commonly known as:  priLOSEC   Quantity:  90 capsule        TAKE 1 CAPSULE(20 MG) BY MOUTH DAILY   Refills:  2        order for DME   Quantity:  1 Device        Disp one glucometer that is covered by insurance with 1 RF. Also disp glucometer strips and lancets to check sugars daily. Disp 100 strips and  Lancets  with prn RFs for 1 year   Refills:  11        order for DME   Quantity:  1 each        Equipment being ordered:   AeroChamber (or similar device for inhaler use)   Refills:  0        pravastatin 40 MG tablet   Commonly known as:  PRAVACHOL   Quantity:  30 tablet        TAKE 1 TABLET BY MOUTH DAILY AT BEDTIME   Refills:  8        vitamin D 2000 units tablet   Dose:  2000 Units   Quantity:  100 tablet        Take 2,000 Units by mouth daily   Refills:  3        * Notice:  This list has 2 medication(s) that are the same as other medications prescribed for you. Read the directions carefully, and ask your doctor or other care provider to review them with you.            Prescriptions were sent or printed at these locations (1 Prescription)                   Other Prescriptions                Printed at Department/Unit printer (1 of 1)         meclizine (ANTIVERT) 25 MG tablet                Procedures and tests performed during your visit     CBC with platelets differential    Cardiac Continuous Monitoring    Comprehensive metabolic panel    EKG 12-lead, tracing only    MR Brain w/o & w Contrast    Orthostatic blood pressure and pulse    Peripheral IV catheter    Pulse oximetry nursing    Troponin I    XR Chest 2 Views      Orders Needing Specimen Collection     None      Pending Results     Date and Time Order Name Status Description    10/29/2018 1226 XR Chest 2 Views Preliminary             Pending Culture Results     No  orders found from 10/27/2018 to 10/30/2018.            Pending Results Instructions     If you had any lab results that were not finalized at the time of your Discharge, you can call the ED Lab Result RN at [phone]. You will be contacted by this team for any positive Lab results or changes in treatment. The nurses are available 7 days a week from 10A to 6:30P.  You can leave a message 24 hours per day and they will return your call.        Test Results From Your Hospital Stay        10/29/2018  2:11 PM      Component Results     Component Value Ref Range & Units Status    WBC 9.1 4.0 - 11.0 10e9/L Final    RBC Count 2.95 (L) 3.8 - 5.2 10e12/L Final    Hemoglobin 9.9 (L) 11.7 - 15.7 g/dL Final    Hematocrit 30.4 (L) 35.0 - 47.0 % Final     (H) 78 - 100 fl Final    MCH 33.6 (H) 26.5 - 33.0 pg Final    MCHC 32.6 31.5 - 36.5 g/dL Final    RDW 13.6 10.0 - 15.0 % Final    Platelet Count 158 150 - 450 10e9/L Final    Diff Method Automated Method  Final    % Neutrophils 38.4 % Final    % Lymphocytes 48.7 % Final    % Monocytes 9.7 % Final    % Eosinophils 2.8 % Final    % Basophils 0.2 % Final    % Immature Granulocytes 0.2 % Final    Nucleated RBCs 0 0 /100 Final    Absolute Neutrophil 3.4 1.6 - 8.3 10e9/L Final    Absolute Lymphocytes 4.4 0.8 - 5.3 10e9/L Final    Absolute Monocytes 0.9 0.0 - 1.3 10e9/L Final    Absolute Eosinophils 0.3 0.0 - 0.7 10e9/L Final    Absolute Basophils 0.0 0.0 - 0.2 10e9/L Final    Abs Immature Granulocytes 0.0 0 - 0.4 10e9/L Final    Absolute Nucleated RBC 0.0  Final         10/29/2018  1:14 PM      Component Results     Component Value Ref Range & Units Status    Sodium 138 133 - 144 mmol/L Final    Potassium 4.7 3.4 - 5.3 mmol/L Final    Chloride 108 94 - 109 mmol/L Final    Carbon Dioxide 25 20 - 32 mmol/L Final    Anion Gap 5 3 - 14 mmol/L Final    Glucose 93 70 - 99 mg/dL Final    Urea Nitrogen 16 7 - 30 mg/dL Final    Creatinine 1.21 (H) 0.52 - 1.04 mg/dL Final    GFR  Estimate 43 (L) >60 mL/min/1.7m2 Final    Non  GFR Calc    GFR Estimate If Black 52 (L) >60 mL/min/1.7m2 Final    African American GFR Calc    Calcium 8.6 8.5 - 10.1 mg/dL Final    Bilirubin Total 0.4 0.2 - 1.3 mg/dL Final    Albumin 3.3 (L) 3.4 - 5.0 g/dL Final    Protein Total 7.7 6.8 - 8.8 g/dL Final    Alkaline Phosphatase 42 40 - 150 U/L Final    ALT 16 0 - 50 U/L Final    AST 15 0 - 45 U/L Final         10/29/2018  1:15 PM      Component Results     Component Value Ref Range & Units Status    Troponin I ES <0.015 0.000 - 0.045 ug/L Final    The 99th percentile for upper reference range is 0.045 ug/L.  Troponin values   in the range of 0.045 - 0.120 ug/L may be associated with risks of adverse   clinical events.           10/29/2018  1:41 PM      Narrative     CHEST TWO VIEWS October 29, 2018 12:58 PM     HISTORY: Dizzy/shortness of breath.     COMPARISON: August 29, 2017.     FINDINGS: Lower lobe infiltrate best seen on lateral view, unclear  which side on the frontal view given the lordotic projection. Upper  lungs clear. The cardiac silhouette is not enlarged. Pulmonary  vasculature is unremarkable.         Impression     IMPRESSION: Lower lobe infiltrate of uncertain laterality best seen on  lateral view.         10/29/2018  2:41 PM      Narrative     MRI BRAIN WITHOUT AND WITH CONTRAST October 29, 2018 2:14 PM     HISTORY: Acute dizziness.      TECHNIQUE: Multiplanar, multisequence MRI of the brain without and  with 12 mL Gadavist.     COMPARISON: Head CT 10/12/2018.     FINDINGS: Images are degraded by motion artifact.    There is no evidence of hemorrhage, mass, acute infarct, or anomaly.  Mild diffuse parenchymal volume loss. Mild patchy deep and subcortical  white matter T2 hyperintensities which are nonspecific, but likely  related to chronic microvascular ischemic disease. Ventricular size  within normal limits without hydrocephalus.     There is no abnormal intracranial enhancement.      Polypoid mucous retention cyst or mucosal polyp in the right maxillary  sinus. Small left mastoid effusion. The major arterial T2 flow voids  at the base of the brain appear patent.         Impression     IMPRESSION:    1. No evidence of acute infarct, mass, hemorrhage, or herniation.  2. Mild diffuse parenchymal volume loss and white matter changes  likely due to chronic microvascular ischemic disease.      RODRIGUE HERBERT MD                Clinical Quality Measure: Blood Pressure Screening     Your blood pressure was checked while you were in the emergency department today. The last reading we obtained was  BP: 123/58 . Please read the guidelines below about what these numbers mean and what you should do about them.  If your systolic blood pressure (the top number) is less than 120 and your diastolic blood pressure (the bottom number) is less than 80, then your blood pressure is normal. There is nothing more that you need to do about it.  If your systolic blood pressure (the top number) is 120-139 or your diastolic blood pressure (the bottom number) is 80-89, your blood pressure may be higher than it should be. You should have your blood pressure rechecked within a year by a primary care provider.  If your systolic blood pressure (the top number) is 140 or greater or your diastolic blood pressure (the bottom number) is 90 or greater, you may have high blood pressure. High blood pressure is treatable, but if left untreated over time it can put you at risk for heart attack, stroke, or kidney failure. You should have your blood pressure rechecked by a primary care provider within the next 4 weeks.  If your provider in the emergency department today gave you specific instructions to follow-up with your doctor or provider even sooner than that, you should follow that instruction and not wait for up to 4 weeks for your follow-up visit.        Thank you for choosing [facility]       Thank you for choosing [facility] for  "your care. Our goal is always to provide you with excellent care. Hearing back from our patients is one way we can continue to improve our services. Please take a few minutes to complete the written survey that you may receive in the mail after you visit with us. Thank you!        [employer] Information     [employer] lets you send messages to your doctor, view your test results, renew your prescriptions, schedule appointments and more. To sign up, go to [email or web address] . Click on \"Log in\" on the left side of the screen, which will take you to the Welcome page. Then click on \"Sign up Now\" on the right side of the page.     You will be asked to enter the access code listed below, as well as some personal information. Please follow the directions to create your username and password.     Your access code is: [email or web address]  Expires: 2019  6:00 PM     Your access code will  in 90 days. If you need help or a new code, please call your [facility] clinic or [phone].        Care EveryWhere ID     This is your Care EveryWhere ID. This could be used by other organizations to access your [facility] medical records  [id number]        Equal Access to Services     ALEJO ABERNATHY AH: Ej Woodard, wicho darby, leighton sinclair, rinku calderon. So [facility] [phone].    ATENCIÓN: Si habla español, tiene a jennings disposición servicios gratuitos de asistencia lingüística. Llame al [phone].    We comply with applicable federal civil rights laws and [place] laws. We do not discriminate on the basis of race, color, national origin, age, disability, sex, sexual orientation, or gender identity.            After Visit Summary       This is your record. Keep this with you and show to your community pharmacist(s) and doctor(s) at your next visit.                  "

## 2018-10-29 NOTE — ED NOTES
The patient was road tested here in the ED with baseline cane. She felt safe and appropriate for discharge home.

## 2018-10-29 NOTE — MR AVS SNAPSHOT
"              After Visit Summary   10/29/2018    Mahogany Blanco    MRN: [id number]           Patient Information     Date Of Birth          1940        Visit Information        Provider Department      10/29/2018 11:25 AM Norman Cabrera,  [facility]        Today's Diagnoses     Severe headache    -  1    Dizziness        Falls frequently           Follow-ups after your visit        Your next 10 appointments already scheduled     Nov 13, 2018  4:30 PM CST   Office Visit with Aquilino Lilly MD   [facility] [place] [place]    [address] [address] [phone]           Bring a current list of meds and any records pertaining to this visit. For Physicals, please bring immunization records and any forms needing to be filled out. Please arrive 10 minutes early to complete paperwork.              Who to contact     If you have questions or need follow up information about today's clinic visit or your schedule please contact [facility] directly at [phone].  Normal or non-critical lab and imaging results will be communicated to you by MyChart, letter or phone within 4 business days after the clinic has received the results. If you do not hear from us within 7 days, please contact the clinic through [employer] or phone. If you have a critical or abnormal lab result, we will notify you by phone as soon as possible.  Submit refill requests through [facility] [employer] or call your pharmacy and they will forward the refill request to us. Please allow 3 business days for your refill to be completed.          Additional Information About Your Visit        MyChart Information     [facility] lets you send messages to your doctor, view your test results, renew your prescriptions, schedule appointments and more. To sign up, go to [email or web address] . Click on \"Log in\" on the left side of the screen, " "which will take you to the Welcome page. Then click on \"Sign up Now\" on the right side of the page.     You will be asked to enter the access code listed below, as well as some personal information. Please follow the directions to create your username and password.     Your access code is: [email or web address]  Expires: 2019  6:00 PM     Your access code will  in 90 days. If you need help or a new code, please call your [facility] clinic or [phone].        Care EveryWhere ID     This is your Care EveryWhere ID. This could be used by other organizations to access your [facility] medical records  [id number]        Your Vitals Were     Pulse Temperature Respirations Last Period Pulse Oximetry       85 97.5  F (36.4  C) (Oral) 16 1985 (Approximate) 94%        Blood Pressure from Last 3 Encounters:   10/29/18 167/74   10/12/18 115/49   18 124/68    Weight from Last 3 Encounters:   10/12/18 260 lb (117.9 kg)   18 248 lb (112.5 kg)   18 244 lb (110.7 kg)              Today, you had the following     No orders found for display       Primary Care Provider Office Phone # Fax #    Aquilino Lilly [phone]       [address] [facility] [place] [address]        Equal Access to Services     [facility] : Hadii brooke ku hadasho Soomaali, waaxda luqadaha, qaybta kaalmada ademarbellayada, rinku benavidez . So [facility] [phone].    ATENCIÓN: Si habla español, tiene a jennings disposición servicios gratuitos de asistencia lingüística. Llame al [phone].    We comply with applicable federal civil rights laws and [place] laws. We do not discriminate on the basis of race, color, national origin, age, disability, sex, sexual orientation, or gender identity.            Thank you!     Thank you for choosing [facility]  for your care. Our goal is always to provide you with excellent care. Hearing back from our patients is one way we can continue to " improve our services. Please take a few minutes to complete the written survey that you may receive in the mail after your visit with us. Thank you!             Your Updated Medication List - Protect others around you: Learn how to safely use, store and throw away your medicines at www.disposemymeds.org.          This list is accurate as of 10/29/18 11:34 AM.  Always use your most recent med list.                   Brand Name Dispense Instructions for use Diagnosis    albuterol 108 (90 Base) MCG/ACT inhaler    VENTOLIN HFA    18 g    INHALE 2 PUFFS BY MOUTH INTO THE LUNGS EVERY 4 HOURS AS NEEDED FOR SHORTNESS OF BREATH OR WHEEZING.        B-12 1000 MCG Tbcr     90 tablet    Take 1,000 mcg by mouth daily    Anemia due to vitamin B12 deficiency, unspecified B12 deficiency type       blood glucose monitoring lancets      Use to test blood sugar once daily or as directed.    Type 2 diabetes, HbA1c goal < 7% (H)       blood glucose monitoring meter device kit    no brand specified    1 kit    Use to test blood sugar one  time daily or as directed.    Type 2 diabetes mellitus with stage 3 chronic kidney disease, without long-term current use of insulin (H)       * blood glucose monitoring test strip    ACCU-CHEK NOELLE    50 each    Use to test blood sugars 1 time daily or as directed.    Type 2 diabetes mellitus with stage 3 chronic kidney disease (H)       * blood glucose monitoring test strip    no brand specified    100 strip    Use to test blood sugars one time daily or as directed    Type 2 diabetes mellitus with stage 3 chronic kidney disease, without long-term current use of insulin (H)       buPROPion 150 MG 24 hr tablet    WELLBUTRIN XL    30 tablet    TAKE 1 TABLET(150 MG) BY MOUTH EVERY MORNING    Major depressive disorder, single episode, moderate (H)       citalopram 20 MG tablet    celeXA    30 tablet    TAKE 1 TABLET(20 MG) BY MOUTH DAILY    Major depressive disorder, single episode, moderate (H)        fluticasone 50 MCG/ACT spray    FLONASE    16 g    Spray 1-2 sprays into both nostrils daily    URI (upper respiratory infection)       levothyroxine 88 MCG tablet    SYNTHROID/LEVOTHROID    90 tablet    Take 1 tablet (88 mcg) by mouth daily    Hypothyroidism, unspecified type       lisinopril 40 MG tablet    PRINIVIL/ZESTRIL    90 tablet    TAKE 1 TABLET(40 MG) BY MOUTH DAILY    Essential hypertension, benign       omeprazole 20 MG CR capsule    priLOSEC    90 capsule    TAKE 1 CAPSULE(20 MG) BY MOUTH DAILY    Gastroesophageal reflux disease, esophagitis presence not specified       order for DME     1 Device    Disp one glucometer that is covered by insurance with 1 RF. Also disp glucometer strips and lancets to check sugars daily. Disp 100 strips and  Lancets  with prn RFs for 1 year    Type 2 diabetes, HbA1c goal < 7% (H)       order for DME     1 each    Equipment being ordered:   AeroChamber (or similar device for inhaler use)    Bronchospasm       pravastatin 40 MG tablet    PRAVACHOL    30 tablet    TAKE 1 TABLET BY MOUTH DAILY AT BEDTIME    Hyperlipidemia LDL goal <100       vitamin D 2000 units tablet     100 tablet    Take 2,000 Units by mouth daily    Vitamin D deficiency       * Notice:  This list has 2 medication(s) that are the same as other medications prescribed for you. Read the directions carefully, and ask your doctor or other care provider to review them with you.

## 2018-10-29 NOTE — ED AVS SNAPSHOT
Emergency Department    [address] [facility] [address]    Phone:  [phone]    Fax:  [phone]                                       Mahogany Blanco   MRN: [id number]    Department:   Emergency Department   Date of Visit:  10/29/2018           After Visit Summary Signature Page     I have received my discharge instructions, and my questions have been answered. I have discussed any challenges I see with this plan with the nurse or doctor.    ..........................................................................................................................................  Patient/Patient Representative Signature      ..........................................................................................................................................  Patient Representative Print Name and Relationship to Patient    ..................................................               ................................................  Date                                   Time    ..........................................................................................................................................  Reviewed by Signature/Title    ...................................................              ..............................................  Date                                               Time          22EPIC Rev 08/18

## 2018-10-30 ENCOUNTER — TELEPHONE (OUTPATIENT)
Dept: INTERNAL MEDICINE | Facility: CLINIC | Age: 78
End: 2018-10-30

## 2018-10-30 DIAGNOSIS — H81.10 BENIGN PAROXYSMAL POSITIONAL VERTIGO, UNSPECIFIED LATERALITY: Primary | ICD-10-CM

## 2018-10-30 NOTE — TELEPHONE ENCOUNTER
Reason for Call:  Other call back    Detailed comments: Pt was seen in the ER for an inner ear infection on 10/29/18.  Please recommend an ENT for her.    Phone Number Patient can be reached at: Home number on file [phone] (home)    Best Time: anytime    Can we leave a detailed message on this number? YES    Call taken on 10/30/2018 at 9:45 AM by NICOLE PORTILLO

## 2018-10-31 NOTE — TELEPHONE ENCOUNTER
Recent ER note reviewed. MRI brain done and unremarkable for acute changes. Pt does not need to see ENT. Needs to be seen by  [facility]. Referral placed and they will call pt to schedule either in [place] or [place] sites. If she  have not heard from the scheduling office within 2 business days, pt to call [phone]. Pt to also f/u with me in mid November as scheduled to address other medical issues. Inform pt

## 2018-11-16 DIAGNOSIS — F32.1 MAJOR DEPRESSIVE DISORDER, SINGLE EPISODE, MODERATE (H): ICD-10-CM

## 2018-11-16 RX ORDER — CITALOPRAM HYDROBROMIDE 20 MG/1
TABLET ORAL
Qty: 30 TABLET | Refills: 0 | Status: SHIPPED | OUTPATIENT
Start: 2018-11-16 | End: 2018-12-19

## 2018-11-16 NOTE — TELEPHONE ENCOUNTER
Medication is being filled for 1 time refill only due to:  Patient needs to be seen because due for medicaiton follow up.     Prescription approved per [facility] Refill Protocol.    Elicia NASSAR RN, BSN, PHN

## 2018-11-16 NOTE — TELEPHONE ENCOUNTER
"Requested Prescriptions   Pending Prescriptions Disp Refills     citalopram (CELEXA) 20 MG tablet [Pharmacy Med Name: CITALOPRAM 20MG TABLETS] 30 tablet 0     Sig: TAKE 1 TABLET(20 MG) BY MOUTH DAILY    SSRIs Protocol Passed    11/16/2018 10:13 AM       Passed - PHQ-9 score less than 5 in past 6 months    Please review last PHQ-9 score.          Passed - Patient is age 18 or older       Passed - No active pregnancy on record       Passed - No positive pregnancy test in last 12 months       Passed - Recent (6 mo) or future (30 days) visit within the authorizing provider's specialty    Patient had office visit in the last 6 months or has a visit in the next 30 days with authorizing provider or within the authorizing provider's specialty.  See \"Patient Info\" tab in inbasket, or \"Choose Columns\" in Meds & Orders section of the refill encounter.            PHQ-9 score:    PHQ-9 SCORE 6/19/2018   Total Score -   Total Score 3         Last Written Prescription Date:  8/15/2018  Last Fill Quantity: 30,  # refills: 2   Last office visit: 8/24/2018 with prescribing provider:  Aquilino Lilly     Future Office Visit:   Next 5 appointments (look out 90 days)     Dec 03, 2018  3:30 PM CST   [facility] MD   [facility]    [address] [address] [phone]                     "

## 2018-12-19 DIAGNOSIS — F32.1 MAJOR DEPRESSIVE DISORDER, SINGLE EPISODE, MODERATE (H): ICD-10-CM

## 2018-12-19 RX ORDER — CITALOPRAM HYDROBROMIDE 20 MG/1
TABLET ORAL
Qty: 30 TABLET | Refills: 0 | Status: SHIPPED | OUTPATIENT
Start: 2018-12-19 | End: 2019-01-17

## 2018-12-19 NOTE — TELEPHONE ENCOUNTER
"Requested Prescriptions   Pending Prescriptions Disp Refills     citalopram (CELEXA) 20 MG tablet [Pharmacy Med Name: CITALOPRAM 20MG TABLETS] 30 tablet 0     Sig: TAKE 1 TABLET(20 MG) BY MOUTH DAILY    SSRIs Protocol Failed - 12/19/2018 10:14 AM       Failed - PHQ-9 score less than 5 in past 6 months    Please review last PHQ-9 score.          Passed - Patient is age 18 or older       Passed - No active pregnancy on record       Passed - No positive pregnancy test in last 12 months       Passed - Recent (6 mo) or future (30 days) visit within the authorizing provider's specialty    Patient had office visit in the last 6 months or has a visit in the next 30 days with authorizing provider or within the authorizing provider's specialty.  See \"Patient Info\" tab in inbasket, or \"Choose Columns\" in Meds & Orders section of the refill encounter.            PHQ-9 SCORE 5/26/2017 11/15/2017 6/19/2018   PHQ-9 Total Score - - -   PHQ-9 Total Score 7 3 3       "

## 2018-12-31 DIAGNOSIS — K21.9 GASTROESOPHAGEAL REFLUX DISEASE, ESOPHAGITIS PRESENCE NOT SPECIFIED: ICD-10-CM

## 2019-01-01 NOTE — TELEPHONE ENCOUNTER
"Requested Prescriptions   Pending Prescriptions Disp Refills     omeprazole (PRILOSEC) 20 MG DR capsule [Pharmacy Med Name: OMEPRAZOLE 20MG CAPSULES]  Last Written Prescription Date:  04/12/2018  Last Fill Quantity: 90,  # refills: 2   Last office visit: 8/24/2018 with prescribing provider:  KEN   Future Office Visit:     90 capsule 0     Sig: TAKE 1 CAPSULE(20 MG) BY MOUTH DAILY    PPI Protocol Passed - 12/31/2018 10:16 AM       Passed - Not on Clopidogrel (unless Pantoprazole ordered)       Passed - No diagnosis of osteoporosis on record       Passed - Recent (12 mo) or future (30 days) visit within the authorizing provider's specialty    Patient had office visit in the last 12 months or has a visit in the next 30 days with authorizing provider or within the authorizing provider's specialty.  See \"Patient Info\" tab in inbasket, or \"Choose Columns\" in Meds & Orders section of the refill encounter.             Passed - Patient is age 18 or older       Passed - No active pregnacy on record       Passed - No positive pregnancy test in past 12 months          "

## 2019-01-17 DIAGNOSIS — F32.1 MAJOR DEPRESSIVE DISORDER, SINGLE EPISODE, MODERATE (H): ICD-10-CM

## 2019-01-17 RX ORDER — CITALOPRAM HYDROBROMIDE 20 MG/1
TABLET ORAL
Qty: 30 TABLET | Refills: 0 | Status: SHIPPED | OUTPATIENT
Start: 2019-01-17 | End: 2019-02-14

## 2019-01-17 RX ORDER — BUPROPION HYDROCHLORIDE 150 MG/1
TABLET ORAL
Qty: 30 TABLET | Refills: 0 | Status: SHIPPED | OUTPATIENT
Start: 2019-01-17 | End: 2019-02-14

## 2019-01-17 NOTE — TELEPHONE ENCOUNTER
"Requested Prescriptions   Pending Prescriptions Disp Refills     buPROPion (WELLBUTRIN XL) 150 MG 24 hr tablet [Pharmacy Med Name: BUPROPION XL 150MG TABLETS (24 H)] 30 tablet 0     Sig: TAKE 1 TABLET(150 MG) BY MOUTH EVERY MORNING    SSRIs Protocol Failed - 1/17/2019 10:18 AM       Failed - PHQ-9 score less than 5 in past 6 months    Please review last PHQ-9 score.          Passed - Medication is Bupropion    If the medication is Bupropion (Wellbutrin), and the patient is taking for smoking cessation; OK to refill.         Passed - Medication is active on med list       Passed - Patient is age 18 or older       Passed - No active pregnancy on record       Passed - No positive pregnancy test in last 12 months       Passed - Recent (6 mo) or future (30 days) visit within the authorizing provider's specialty    Patient had office visit in the last 6 months or has a visit in the next 30 days with authorizing provider or within the authorizing provider's specialty.  See \"Patient Info\" tab in inbasket, or \"Choose Columns\" in Meds & Orders section of the refill encounter.            citalopram (CELEXA) 20 MG tablet [Pharmacy Med Name: CITALOPRAM 20MG TABLETS] 30 tablet 0     Sig: TAKE 1 TABLET(20 MG) BY MOUTH DAILY    SSRIs Protocol Failed - 1/17/2019 10:18 AM       Failed - PHQ-9 score less than 5 in past 6 months    Please review last PHQ-9 score.          Passed - Medication is Bupropion    If the medication is Bupropion (Wellbutrin), and the patient is taking for smoking cessation; OK to refill.         Passed - Medication is active on med list       Passed - Patient is age 18 or older       Passed - No active pregnancy on record       Passed - No positive pregnancy test in last 12 months       Passed - Recent (6 mo) or future (30 days) visit within the authorizing provider's specialty    Patient had office visit in the last 6 months or has a visit in the next 30 days with authorizing provider or within the " "authorizing provider's specialty.  See \"Patient Info\" tab in inbasket, or \"Choose Columns\" in Meds & Orders section of the refill encounter.            PHQ-9 SCORE 5/26/2017 11/15/2017 6/19/2018   PHQ-9 Total Score - - -   PHQ-9 Total Score 7 3 3       "

## 2019-02-02 DIAGNOSIS — I10 ESSENTIAL HYPERTENSION, BENIGN: ICD-10-CM

## 2019-02-02 NOTE — TELEPHONE ENCOUNTER
"Requested Prescriptions   Pending Prescriptions Disp Refills     lisinopril (PRINIVIL/ZESTRIL) 40 MG tablet [Pharmacy Med Name: LISINOPRIL 40MG TABLETS] 90 tablet 0    Last Written Prescription Date:  2/9/2018  Last Fill Quantity: 90,  # refills: 3   Last office visit: 8/24/2018 with prescribing provider:  8/24/2018   Future Office Visit:     Sig: TAKE 1 TABLET(40 MG) BY MOUTH DAILY    ACE Inhibitors (Including Combos) Protocol Failed - 2/2/2019 10:09 AM       Failed - Normal serum creatinine on file in past 12 months    Recent Labs   Lab Test 10/29/18  1220   CR 1.21*            Passed - Blood pressure under 140/90 in past 12 months    BP Readings from Last 3 Encounters:   10/29/18 123/58   10/29/18 167/74   10/12/18 115/49                Passed - Recent (12 mo) or future (30 days) visit within the authorizing provider's specialty    Patient had office visit in the last 12 months or has a visit in the next 30 days with authorizing provider or within the authorizing provider's specialty.  See \"Patient Info\" tab in inbasket, or \"Choose Columns\" in Meds & Orders section of the refill encounter.             Passed - Medication is active on med list       Passed - Patient is age 18 or older       Passed - No active pregnancy on record       Passed - Normal serum potassium on file in past 12 months    Recent Labs   Lab Test 10/29/18  1220   POTASSIUM 4.7            Passed - No positive pregnancy test within past 12 months          "

## 2019-02-04 RX ORDER — LISINOPRIL 40 MG/1
TABLET ORAL
Qty: 90 TABLET | Refills: 2 | Status: SHIPPED | OUTPATIENT
Start: 2019-02-04 | End: 2020-06-30

## 2019-02-14 DIAGNOSIS — F32.1 MAJOR DEPRESSIVE DISORDER, SINGLE EPISODE, MODERATE (H): ICD-10-CM

## 2019-02-14 NOTE — LETTER
[facility]  [address] [id number] [phone]            Mahogany Blanco  [address]  [place] [facility] [address]        February 15, 2019    Dear Mahogany,    While refilling your prescription today, we noticed that you are due for an appointment with your provider.  We will refill your prescription for 30 days, but a follow-up appointment must be made before any additional refills can be approved.     Taking care of your health is important to us and we look forward to seeing you in the near future.  Please call us at [phone] or [phone] (or use [employer]) to schedule an appointment.     Please disregard this notice if you have already made an appointment.    Sincerely,        [facility]

## 2019-02-14 NOTE — TELEPHONE ENCOUNTER
"Requested Prescriptions   Pending Prescriptions Disp Refills     citalopram (CELEXA) 20 MG tablet [Pharmacy Med Name: CITALOPRAM 20MG TABLETS]  Last Written Prescription Date:  1/17/19  Last Fill Quantity: 30 TABLET,  # refills: 0   Last office visit: 10/29/18 with prescribing provider:  KAMRYN   Future Office Visit:     30 tablet 0     Sig: TAKE 1 TABLET(20 MG) BY MOUTH DAILY    SSRIs Protocol Failed - 2/14/2019  5:12 PM       Failed - PHQ-9 score less than 5 in past 6 months    Please review last PHQ-9 score.   PHQ-9 SCORE 5/26/2017 11/15/2017 6/19/2018   PHQ-9 Total Score - - -   PHQ-9 Total Score 7 3 3     No flowsheet data found.               Passed - Medication is Bupropion    If the medication is Bupropion (Wellbutrin), and the patient is taking for smoking cessation; OK to refill.         Passed - Medication is active on med list       Passed - Patient is age 18 or older       Passed - No active pregnancy on record       Passed - No positive pregnancy test in last 12 months       Passed - Recent (6 mo) or future (30 days) visit within the authorizing provider's specialty    Patient had office visit in the last 6 months or has a visit in the next 30 days with authorizing provider or within the authorizing provider's specialty.  See \"Patient Info\" tab in inbasket, or \"Choose Columns\" in Meds & Orders section of the refill encounter.            buPROPion (WELLBUTRIN XL) 150 MG 24 hr tablet [Pharmacy Med Name: BUPROPION XL 150MG TABLETS (24 H)]  Last Written Prescription Date:  1/17/19  Last Fill Quantity: 30 TABLET,  # refills: 0   Last office visit: 10/29/18 with prescribing provider:  KAMRYN   Future Office Visit:     30 tablet 0     Sig: TAKE 1 TABLET(150 MG) BY MOUTH EVERY MORNING    SSRIs Protocol Failed - 2/14/2019  5:12 PM       Failed - PHQ-9 score less than 5 in past 6 months    Please review last PHQ-9 score.   PHQ-9 SCORE 5/26/2017 11/15/2017 6/19/2018   PHQ-9 Total Score - - -   PHQ-9 Total Score 7 " "3 3     No flowsheet data found.               Passed - Medication is Bupropion    If the medication is Bupropion (Wellbutrin), and the patient is taking for smoking cessation; OK to refill.         Passed - Medication is active on med list       Passed - Patient is age 18 or older       Passed - No active pregnancy on record       Passed - No positive pregnancy test in last 12 months       Passed - Recent (6 mo) or future (30 days) visit within the authorizing provider's specialty    Patient had office visit in the last 6 months or has a visit in the next 30 days with authorizing provider or within the authorizing provider's specialty.  See \"Patient Info\" tab in inbasket, or \"Choose Columns\" in Meds & Orders section of the refill encounter.              "

## 2019-02-15 DIAGNOSIS — F32.1 MAJOR DEPRESSIVE DISORDER, SINGLE EPISODE, MODERATE (H): ICD-10-CM

## 2019-02-15 RX ORDER — BUPROPION HYDROCHLORIDE 150 MG/1
TABLET ORAL
Qty: 90 TABLET | Refills: 0 | OUTPATIENT
Start: 2019-02-15

## 2019-02-15 RX ORDER — CITALOPRAM HYDROBROMIDE 20 MG/1
TABLET ORAL
Qty: 90 TABLET | Refills: 0 | OUTPATIENT
Start: 2019-02-15

## 2019-02-15 RX ORDER — BUPROPION HYDROCHLORIDE 150 MG/1
TABLET ORAL
Qty: 30 TABLET | Refills: 0 | Status: SHIPPED | OUTPATIENT
Start: 2019-02-15 | End: 2019-03-14

## 2019-02-15 RX ORDER — CITALOPRAM HYDROBROMIDE 20 MG/1
TABLET ORAL
Qty: 30 TABLET | Refills: 0 | Status: SHIPPED | OUTPATIENT
Start: 2019-02-15 | End: 2019-03-14

## 2019-02-15 NOTE — TELEPHONE ENCOUNTER
"Requested Prescriptions   Pending Prescriptions Disp Refills     citalopram (CELEXA) 20 MG tablet [Pharmacy Med Name: CITALOPRAM 20MG TABLETS] 90 tablet 0     Sig: TAKE 1 TABLET BY MOUTH EVERY DAY    SSRIs Protocol Failed - 2/15/2019 10:04 AM       Failed - PHQ-9 score less than 5 in past 6 months    Please review last PHQ-9 score.          Passed - Medication is Bupropion    If the medication is Bupropion (Wellbutrin), and the patient is taking for smoking cessation; OK to refill.         Passed - Medication is active on med list       Passed - Patient is age 18 or older       Passed - No active pregnancy on record       Passed - No positive pregnancy test in last 12 months       Passed - Recent (6 mo) or future (30 days) visit within the authorizing provider's specialty    Patient had office visit in the last 6 months or has a visit in the next 30 days with authorizing provider or within the authorizing provider's specialty.  See \"Patient Info\" tab in inbasket, or \"Choose Columns\" in Meds & Orders section of the refill encounter.            buPROPion (WELLBUTRIN XL) 150 MG 24 hr tablet [Pharmacy Med Name: BUPROPION XL 150MG TABLETS (24 H)] 90 tablet 0     Sig: TAKE 1 TABLET BY MOUTH EVERY MORNING    SSRIs Protocol Failed - 2/15/2019 10:04 AM       Failed - PHQ-9 score less than 5 in past 6 months    Please review last PHQ-9 score.          Passed - Medication is Bupropion    If the medication is Bupropion (Wellbutrin), and the patient is taking for smoking cessation; OK to refill.         Passed - Medication is active on med list       Passed - Patient is age 18 or older       Passed - No active pregnancy on record       Passed - No positive pregnancy test in last 12 months       Passed - Recent (6 mo) or future (30 days) visit within the authorizing provider's specialty    Patient had office visit in the last 6 months or has a visit in the next 30 days with authorizing provider or within the authorizing " "provider's specialty.  See \"Patient Info\" tab in inbasket, or \"Choose Columns\" in Meds & Orders section of the refill encounter.            PHQ-9 SCORE 5/26/2017 11/15/2017 6/19/2018   PHQ-9 Total Score - - -   PHQ-9 Total Score 7 3 3   duplicare  "

## 2019-03-09 DIAGNOSIS — E78.5 HYPERLIPIDEMIA LDL GOAL <100: ICD-10-CM

## 2019-03-09 NOTE — TELEPHONE ENCOUNTER
"Requested Prescriptions   Pending Prescriptions Disp Refills     pravastatin (PRAVACHOL) 40 MG tablet [Pharmacy Med Name: PRAVASTATIN 40MG TABLETS] 30 tablet 0    Last Written Prescription Date:  5/8/2018  Last Fill Quantity: 30,  # refills: 8   Last office visit: 8/24/2018 with prescribing provider:  8/24/2018   Future Office Visit:     Sig: TAKE 1 TABLET BY MOUTH DAILY AT BEDTIME    Statins Protocol Passed - 3/9/2019  3:16 PM       Passed - LDL on file in past 12 months    Recent Labs   Lab Test 04/09/18  0939   LDL 77            Passed - No abnormal creatine kinase in past 12 months    No lab results found.            Passed - Recent (12 mo) or future (30 days) visit within the authorizing provider's specialty    Patient had office visit in the last 12 months or has a visit in the next 30 days with authorizing provider or within the authorizing provider's specialty.  See \"Patient Info\" tab in inbasket, or \"Choose Columns\" in Meds & Orders section of the refill encounter.             Passed - Medication is active on med list       Passed - Patient is age 18 or older       Passed - No active pregnancy on record       Passed - No positive pregnancy test in past 12 months          "

## 2019-03-11 RX ORDER — PRAVASTATIN SODIUM 40 MG
TABLET ORAL
Qty: 30 TABLET | Refills: 1 | Status: SHIPPED | OUTPATIENT
Start: 2019-03-11 | End: 2019-05-06

## 2019-03-14 ENCOUNTER — OFFICE VISIT (OUTPATIENT)
Dept: INTERNAL MEDICINE | Facility: CLINIC | Age: 79
End: 2019-03-14
Payer: COMMERCIAL

## 2019-03-14 VITALS
HEART RATE: 71 BPM | RESPIRATION RATE: 14 BRPM | SYSTOLIC BLOOD PRESSURE: 114 MMHG | TEMPERATURE: 98 F | DIASTOLIC BLOOD PRESSURE: 64 MMHG | WEIGHT: 232 LBS | OXYGEN SATURATION: 94 % | HEIGHT: 64 IN | BODY MASS INDEX: 39.61 KG/M2

## 2019-03-14 DIAGNOSIS — F32.1 MAJOR DEPRESSIVE DISORDER, SINGLE EPISODE, MODERATE (H): ICD-10-CM

## 2019-03-14 DIAGNOSIS — M25.512 ACUTE PAIN OF LEFT SHOULDER: Primary | ICD-10-CM

## 2019-03-14 DIAGNOSIS — D80.8 LIGHT CHAIN DISEASE (H): ICD-10-CM

## 2019-03-14 DIAGNOSIS — D64.9 ANEMIA, UNSPECIFIED TYPE: ICD-10-CM

## 2019-03-14 DIAGNOSIS — E03.9 HYPOTHYROIDISM, UNSPECIFIED TYPE: ICD-10-CM

## 2019-03-14 DIAGNOSIS — D47.2 MGUS (MONOCLONAL GAMMOPATHY OF UNKNOWN SIGNIFICANCE): ICD-10-CM

## 2019-03-14 DIAGNOSIS — Z13.89 SCREENING FOR DIABETIC PERIPHERAL NEUROPATHY: ICD-10-CM

## 2019-03-14 DIAGNOSIS — E11.22 TYPE 2 DIABETES MELLITUS WITH STAGE 3 CHRONIC KIDNEY DISEASE, WITHOUT LONG-TERM CURRENT USE OF INSULIN (H): ICD-10-CM

## 2019-03-14 DIAGNOSIS — N18.30 CKD (CHRONIC KIDNEY DISEASE) STAGE 3, GFR 30-59 ML/MIN (H): ICD-10-CM

## 2019-03-14 DIAGNOSIS — N18.30 TYPE 2 DIABETES MELLITUS WITH STAGE 3 CHRONIC KIDNEY DISEASE, WITHOUT LONG-TERM CURRENT USE OF INSULIN (H): ICD-10-CM

## 2019-03-14 LAB
ALBUMIN SERPL-MCNC: 3.6 G/DL (ref 3.4–5)
ALP SERPL-CCNC: 45 U/L (ref 40–150)
ALT SERPL W P-5'-P-CCNC: 17 U/L (ref 0–50)
ANION GAP SERPL CALCULATED.3IONS-SCNC: 7 MMOL/L (ref 3–14)
AST SERPL W P-5'-P-CCNC: 16 U/L (ref 0–45)
BILIRUB SERPL-MCNC: 0.4 MG/DL (ref 0.2–1.3)
BUN SERPL-MCNC: 19 MG/DL (ref 7–30)
CALCIUM SERPL-MCNC: 8.8 MG/DL (ref 8.5–10.1)
CHLORIDE SERPL-SCNC: 109 MMOL/L (ref 94–109)
CHOLEST SERPL-MCNC: 141 MG/DL
CO2 SERPL-SCNC: 24 MMOL/L (ref 20–32)
CREAT SERPL-MCNC: 1.28 MG/DL (ref 0.52–1.04)
ERYTHROCYTE [DISTWIDTH] IN BLOOD BY AUTOMATED COUNT: 13.6 % (ref 10–15)
GFR SERPL CREATININE-BSD FRML MDRD: 40 ML/MIN/{1.73_M2}
GLUCOSE SERPL-MCNC: 94 MG/DL (ref 70–99)
HBA1C MFR BLD: 6 % (ref 0–5.6)
HCT VFR BLD AUTO: 31.9 % (ref 35–47)
HDLC SERPL-MCNC: 49 MG/DL
HGB BLD-MCNC: 10.2 G/DL (ref 11.7–15.7)
LDLC SERPL CALC-MCNC: 68 MG/DL
MCH RBC QN AUTO: 33.7 PG (ref 26.5–33)
MCHC RBC AUTO-ENTMCNC: 32 G/DL (ref 31.5–36.5)
MCV RBC AUTO: 105 FL (ref 78–100)
NONHDLC SERPL-MCNC: 92 MG/DL
PLATELET # BLD AUTO: 159 10E9/L (ref 150–450)
POTASSIUM SERPL-SCNC: 4.7 MMOL/L (ref 3.4–5.3)
PROT SERPL-MCNC: 7.9 G/DL (ref 6.8–8.8)
RBC # BLD AUTO: 3.03 10E12/L (ref 3.8–5.2)
SODIUM SERPL-SCNC: 140 MMOL/L (ref 133–144)
TRIGL SERPL-MCNC: 122 MG/DL
TSH SERPL DL<=0.005 MIU/L-ACNC: 3 MU/L (ref 0.4–4)
WBC # BLD AUTO: 13.3 10E9/L (ref 4–11)

## 2019-03-14 PROCEDURE — 83883 ASSAY NEPHELOMETRY NOT SPEC: CPT | Performed by: INTERNAL MEDICINE

## 2019-03-14 PROCEDURE — 80053 COMPREHEN METABOLIC PANEL: CPT | Performed by: INTERNAL MEDICINE

## 2019-03-14 PROCEDURE — 00000402 ZZHCL STATISTIC TOTAL PROTEIN: Performed by: INTERNAL MEDICINE

## 2019-03-14 PROCEDURE — 83036 HEMOGLOBIN GLYCOSYLATED A1C: CPT | Performed by: INTERNAL MEDICINE

## 2019-03-14 PROCEDURE — 84443 ASSAY THYROID STIM HORMONE: CPT | Performed by: INTERNAL MEDICINE

## 2019-03-14 PROCEDURE — 80061 LIPID PANEL: CPT | Performed by: INTERNAL MEDICINE

## 2019-03-14 PROCEDURE — 36415 COLL VENOUS BLD VENIPUNCTURE: CPT | Performed by: INTERNAL MEDICINE

## 2019-03-14 PROCEDURE — 99214 OFFICE O/P EST MOD 30 MIN: CPT | Performed by: INTERNAL MEDICINE

## 2019-03-14 PROCEDURE — 83883 ASSAY NEPHELOMETRY NOT SPEC: CPT | Mod: 59 | Performed by: INTERNAL MEDICINE

## 2019-03-14 PROCEDURE — 99207 C FOOT EXAM  NO CHARGE: CPT | Mod: 25 | Performed by: INTERNAL MEDICINE

## 2019-03-14 PROCEDURE — 85027 COMPLETE CBC AUTOMATED: CPT | Performed by: INTERNAL MEDICINE

## 2019-03-14 PROCEDURE — 84165 PROTEIN E-PHORESIS SERUM: CPT | Performed by: INTERNAL MEDICINE

## 2019-03-14 PROCEDURE — 82784 ASSAY IGA/IGD/IGG/IGM EACH: CPT | Performed by: INTERNAL MEDICINE

## 2019-03-14 RX ORDER — BUPROPION HYDROCHLORIDE 150 MG/1
TABLET ORAL
Qty: 90 TABLET | Refills: 3 | Status: SHIPPED | OUTPATIENT
Start: 2019-03-14 | End: 2020-01-11 | Stop reason: ALTCHOICE

## 2019-03-14 RX ORDER — CITALOPRAM HYDROBROMIDE 20 MG/1
20 TABLET ORAL DAILY
Qty: 90 TABLET | Refills: 3 | Status: SHIPPED | OUTPATIENT
Start: 2019-03-14 | End: 2020-01-11

## 2019-03-14 ASSESSMENT — MIFFLIN-ST. JEOR: SCORE: 1517.35

## 2019-03-14 NOTE — PROGRESS NOTES
SUBJECTIVE:   Mahogany Blanco is a 78 year old female who presents to clinic today for the following health issues:    Slipped on side of bed Sunday-large bruise on left side    Depression and Anxiety Follow-Up    Status since last visit: No change    Other associated symptoms:sleeping a lot lately-    Complicating factors:     Significant life event: No     Current substance abuse: None    PHQ 5/26/2017 11/15/2017 6/19/2018   PHQ-9 Total Score 7 3 3   Q9: Suicide Ideation Not at all Not at all Not at all     No flowsheet data found.    PHQ-9  English  PHQ-9   Any Language  KISHA-7  Suicide Assessment Five-step Evaluation and Treatment (SAFE-T)    Amount of exercise or physical activity: walks around the house-will be having treadmill brought up stairs    Problems taking medications regularly: No    Medication side effects: none    Diet: low salt and no taste for meats    Pt's past medical history, family history, habits, medications and allergies were reviewed with the patient today.  See snap shot for  HCM status. Most recent lab results reviewed with pt. Problem list and histories reviewed & adjusted, as indicated.  Additional history as below:     Weight loss 30 pounds with trying to improve diet  Was trying to sit down on her bed last Sunday and missed the average and fell and hit her left shoulder area on the floor.  Has developed a bruise on the deltoid and bicep area since that time.  Did not hit her head.  No loss of consciousness.  Patient has mild discomfort in the shoulder but has been able to move it.  Fell into a carpeted floor.  Denies hip pain.  PHQ = 7 and  KISHA = 4.   Patient states the reasons for her number elevations with the scores is related to the fact that when her son was previously living with her, she had given a lot of money to him to do things such as by a new car, paying for food and clothing for him and other issues for many years.  He is now moved out and living with a girlfriend.   "Because of all these pain meds however, patient is now about $100,000 in debt.  She is worried that she may lose her home because of mortgage issues.  She is working with her other 2 daughters which she states have told her that they will not allow her to have to move out of her home and patient is now feeling guilty about having spent all the money improperly.  Patient denies suicidal ideation.  Declines need for additional medication at this time.  Denies vision changes, exertional chest pain, increasing shortness of breath beyond chronic baseline related with her obesity or abdominal pain.  Denies numbness in her extremities.  History of diabetes.  Not checking blood sugars at home.  Due for follow-up labs.  Also has a history of MGUS with light chain disease and needs follow-up.      Additional ROS:   Constitutional, HEENT, Cardiovascular, Pulmonary, GI and , Neuro, MSK and Psych review of systems/symptoms are otherwise negative or unchanged from previous, except as noted above.      OBJECTIVE:  /64   Pulse 71   Temp 98  F (36.7  C) (Oral)   Resp 14   Ht 1.626 m (5' 4\")   Wt 105.2 kg (232 lb)   LMP 06/16/1985 (Approximate)   SpO2 94%   Breastfeeding? No   BMI 39.82 kg/m     Estimated body mass index is 39.82 kg/m  as calculated from the following:    Height as of this encounter: 1.626 m (5' 4\").    Weight as of this encounter: 105.2 kg (232 lb).     Neck: no adenopathy. Thyroid normal to palpation. No bruits  Pulm: Lungs clear to auscultation   CV: Regular rates and rhythm  GI: Soft, obese, nontender, Normal active bowel sounds, No hepatosplenomegaly or masses palpable  Ext: Peripheral pulses intact. Minimal BLE edema.  Mild tenderness to abduction left shoulder beyond 80 degrees.  No tenderness to internal/external rotation.  Ecchymosis seen over the deltoid and most of the bicep/tricep area.  Strength of the shoulder and bicep/tricep intact.  No tenderness to palpation along the clavicle or " acromium  Neuro: Normal strength and tone, sensory exam grossly normal    Assessment/Plan: (See plan discussion below for further details)  1. Acute pain of left shoulder  Appears to be a strain of the rotator cuff.  Strength still intact.  Fracture unlikely based on exam and improving symptoms.  Patient declines x-ray today.  Will work on some range of motion exercises and if not improving with this, patient to inform physician and will refer to physical therapy and obtain a shoulder x-ray.  Icing as needed    2. Major depressive disorder, single episode, moderate (H)  Situational issue due to finances.  Patient is working with her daughters to stabilize this and patient son is now moved out of her home.  Continue current therapy  - buPROPion (WELLBUTRIN XL) 150 MG 24 hr tablet; TAKE 1 TABLET(150 MG) BY MOUTH EVERY MORNING  Dispense: 90 tablet; Refill: 3  - citalopram (CELEXA) 20 MG tablet; Take 1 tablet (20 mg) by mouth daily  Dispense: 90 tablet; Refill: 3    3. Light chain disease (H)   Due for lab follow-up.   - CBC with platelets  - Immunoglobulins A G and M  - Protein electrophoresis  - Kappa and lambda light chain    4. Type 2 diabetes mellitus with stage 3 chronic kidney disease, without long-term current use of insulin (H)  Patient not checking blood sugars.  Previously well controlled.  Labs as ordered  - Hemoglobin A1c  - Comprehensive metabolic panel  - Lipid panel reflex to direct LDL Fasting    5. CKD (chronic kidney disease) stage 3, GFR 30-59 ml/min (H)  Labs ordered for follow-up  - Comprehensive metabolic panel    6. Anemia, unspecified type  Needs lab follow-up  - CBC with platelets    7. MGUS (monoclonal gammopathy of unknown significance)  Previously stable.  Patient has previously declined hematology consultation/bone marrow biopsy.  Will check follow-up labs  - CBC with platelets  - Immunoglobulins A G and M  - Protein electrophoresis  - Kappa and lambda light chain    8. Hypothyroidism,  unspecified type  On levothyroxine.  Lab as ordered  - TSH with free T4 reflex    9. Screening for diabetic peripheral neuropathy  - FOOT EXAM  NO CHARGE [32935.972]      Plan discussion:  Continue current meds  Prescriptions refilled.    Labs today as ordered   Eye  Exam appt   Range for motion exercise for left shoulder in AM. Ice to the area of soreness in PM as needed  If shoulder not improving over the next 1-2 weeks, then call for referral to physical therapy and will also check Xray left shoulder   See me in follow-up in September or earlier as needed  Continue to reduce calorie/carbohydrate intake for further weight loss         Aquilino Lilly MD  Internal Medicine Department  [facility]

## 2019-03-14 NOTE — PATIENT INSTRUCTIONS
Continue current meds  Prescriptions refilled.    Labs today as ordered   Eye  Exam appt   Range for motion exercise for left shoulder in AM. Ice to the area of soreness in PM as needed  If shoulder not improving over the next 1-2 weeks, then call for referral to physical therapy and will also check Xray left shoulder   See me in follow-up in September or earlier as needed  Continue to reduce calorie/carbohydrate intake for further weight loss

## 2019-03-14 NOTE — NURSING NOTE
"Chief Complaint   Patient presents with     RECHECK     follow up for refills       Initial /64   Pulse 71   Temp 98  F (36.7  C) (Oral)   Resp 14   Ht 1.626 m (5' 4\")   Wt 105.2 kg (232 lb)   LMP 06/16/1985 (Approximate)   SpO2 94%   Breastfeeding? No   BMI 39.82 kg/m   Estimated body mass index is 39.82 kg/m  as calculated from the following:    Height as of this encounter: 1.626 m (5' 4\").    Weight as of this encounter: 105.2 kg (232 lb).  BP completed using cuff size: regular,     Health Maintenance that is potentially due pending provider review:  Health Maintenance Due   Topic Date Due     MEDICARE ANNUAL WELLNESS VISIT  04/08/2005     ZOSTER IMMUNIZATION (2 of 3) 01/21/2012     ADVANCE DIRECTIVE PLANNING Q5 YRS  02/06/2017     FOOT EXAM Q1 YEAR  05/26/2018     INFLUENZA VACCINE (1) 09/01/2018     A1C Q6 MO  10/09/2018     PHQ-9 Q6 MONTHS  12/19/2018     FALL RISK ASSESSMENT  01/19/2019     EYE EXAM Q1 YEAR  02/21/2019     LIPID MONITORING Q1 YEAR  04/09/2019     MICROALBUMIN Q1 YEAR  04/09/2019     No flu vaccine given this yr  [facility]-last exam 2-2018-WNL  PHQ9 given to pt      "

## 2019-03-15 LAB
ALBUMIN SERPL ELPH-MCNC: 3.8 G/DL (ref 3.7–5.1)
ALPHA1 GLOB SERPL ELPH-MCNC: 0.3 G/DL (ref 0.2–0.4)
ALPHA2 GLOB SERPL ELPH-MCNC: 0.9 G/DL (ref 0.5–0.9)
B-GLOBULIN SERPL ELPH-MCNC: 0.9 G/DL (ref 0.6–1)
GAMMA GLOB SERPL ELPH-MCNC: 1.7 G/DL (ref 0.7–1.6)
IGA SERPL-MCNC: 307 MG/DL (ref 70–380)
IGG SERPL-MCNC: 1590 MG/DL (ref 695–1620)
IGM SERPL-MCNC: 255 MG/DL (ref 60–265)
KAPPA LC UR-MCNC: 5.78 MG/DL (ref 0.33–1.94)
KAPPA LC/LAMBDA SER: 1.22 {RATIO} (ref 0.26–1.65)
LAMBDA LC SERPL-MCNC: 4.72 MG/DL (ref 0.57–2.63)
M PROTEIN SERPL ELPH-MCNC: 0.2 G/DL
PROT PATTERN SERPL ELPH-IMP: ABNORMAL

## 2019-03-16 PROBLEM — D80.8 LIGHT CHAIN DISEASE (H): Status: ACTIVE | Noted: 2019-03-16

## 2019-03-16 ASSESSMENT — ANXIETY QUESTIONNAIRES
3. WORRYING TOO MUCH ABOUT DIFFERENT THINGS: MORE THAN HALF THE DAYS
1. FEELING NERVOUS, ANXIOUS, OR ON EDGE: NOT AT ALL
GAD7 TOTAL SCORE: 4
IF YOU CHECKED OFF ANY PROBLEMS ON THIS QUESTIONNAIRE, HOW DIFFICULT HAVE THESE PROBLEMS MADE IT FOR YOU TO DO YOUR WORK, TAKE CARE OF THINGS AT HOME, OR GET ALONG WITH OTHER PEOPLE: SOMEWHAT DIFFICULT
5. BEING SO RESTLESS THAT IT IS HARD TO SIT STILL: NOT AT ALL
2. NOT BEING ABLE TO STOP OR CONTROL WORRYING: SEVERAL DAYS
7. FEELING AFRAID AS IF SOMETHING AWFUL MIGHT HAPPEN: SEVERAL DAYS
6. BECOMING EASILY ANNOYED OR IRRITABLE: NOT AT ALL

## 2019-03-16 ASSESSMENT — PATIENT HEALTH QUESTIONNAIRE - PHQ9
SUM OF ALL RESPONSES TO PHQ QUESTIONS 1-9: 7
5. POOR APPETITE OR OVEREATING: NOT AT ALL

## 2019-03-17 ASSESSMENT — ANXIETY QUESTIONNAIRES: GAD7 TOTAL SCORE: 4

## 2019-04-29 ENCOUNTER — OFFICE VISIT (OUTPATIENT)
Dept: INTERNAL MEDICINE | Facility: CLINIC | Age: 79
End: 2019-04-29
Payer: COMMERCIAL

## 2019-04-29 VITALS
OXYGEN SATURATION: 96 % | WEIGHT: 231 LBS | SYSTOLIC BLOOD PRESSURE: 128 MMHG | TEMPERATURE: 98 F | DIASTOLIC BLOOD PRESSURE: 72 MMHG | HEART RATE: 72 BPM | RESPIRATION RATE: 16 BRPM | BODY MASS INDEX: 39.65 KG/M2

## 2019-04-29 DIAGNOSIS — R19.5 LOOSE STOOLS: ICD-10-CM

## 2019-04-29 DIAGNOSIS — R55 SYNCOPE, UNSPECIFIED SYNCOPE TYPE: Primary | ICD-10-CM

## 2019-04-29 PROCEDURE — 99214 OFFICE O/P EST MOD 30 MIN: CPT | Performed by: INTERNAL MEDICINE

## 2019-04-29 NOTE — PROGRESS NOTES
"  SUBJECTIVE:   Mahogany Blanco is a 79 year old female who presents to clinic today for the following   health issues:    Dizziness  Onset: Patient had a \"spell\" on 04/28/2019 3x    Description:   Do you feel faint:  no   Does it feel like the surroundings (bed, room) are moving: no   Unsteady/off balance: YES  Have you passed out or fallen: no     Intensity: moderate    Progression of Symptoms:  Worsening, Patient became delusional    Accompanying Signs & Symptoms:  Heart palpitations: no   Nausea, vomiting: no   Weakness in arms or legs: no   Fatigue: no   Vision or speech changes: YES- Patient gets \"floaters\"  Ringing in ears (Tinnitus): YES- Patient does get \"rinning\" in ears  Hearing Loss: no     History:   Head trauma/concussion hx: no   Previous similar symptoms: YES- Dizziness, but not the delusions  Recent bleeding history: no     Precipitating factors:   Worse with activity or head movement: no   Any new medications (BP?): no   Alcohol/drug abuse/withdrawal: no     Alleviating factors:   Does staying in a fixed position give relief:  YES    Therapies Tried and outcome: The EMT was called;B/P and blood sugar was checked and were all with in normal limits.      Pt's past medical history, family history, habits, medications and allergies were reviewed with the patient today.  See snap shot for  HCM status. Most recent lab results reviewed with pt. Problem list and histories reviewed & adjusted, as indicated.  Additional history as below:    Patient seen today with   her 2 daughters who were witnessed to be involving events.  Family members haD been at the patient's home for birthday celebration.  This was somewhat stressful for the patient trying to help prepare food.  Patient describes reaching up to a shelf in the kitchen with her head upward and getting some lightheadedness without vertigo.  Patient sat down in a chair for a moment and then felt lightheadedness some more without obvious palpitations.  " "Patient then began having some delusional visions of seeing her  's.  There is no actual loss of consciousness.  No seizure activity was seen.  Patient recovered seeing some spots in her vision.  911 was called.  Patient had a blood sugar of 139 and blood pressure of 136/62 according to the daughters went paramedics arrived.  Patient was doing better by that time.  She also remembers having some nausea without actual vomiting during this time.  Patient refused to be seen in the hospital.  Went to bed for the night.  Woke up this morning and showered and dressed by herself.  Ate a normal breakfast this morning.  Patient is able to tell me the specific meal that she ate last night for supper after this event occurred and daughters confirm that patient's memory is correct regarding the details of that meal.  Patient states her mood has been okay in general prior to discuss of this big party celebration.  Patient tells me \"I am fine now and do not need a big evaluation and you are not going to put me in the hospital or a nursing home\".  Patient denies current chest pain or shortness of breath       Additional ROS:   Constitutional, HEENT, Cardiovascular, Pulmonary, GI and , Neuro, MSK and Psych review of systems/symptoms are otherwise negative or unchanged from previous, except as noted above.      OBJECTIVE:  /72   Pulse 72   Temp 98  F (36.7  C) (Oral)   Resp 16   Wt 104.8 kg (231 lb)   LMP 1985 (Approximate)   SpO2 96%   BMI 39.65 kg/m     Estimated body mass index is 39.65 kg/m  as calculated from the following:    Height as of 3/14/19: 1.626 m (5' 4\").    Weight as of this encounter: 104.8 kg (231 lb).  Eye: PERRL, EOMI  HENT: ear canals and TM's normal and nose and mouth without ulcers or lesions   Neck: no adenopathy. Thyroid normal to palpation. No bruits  Pulm: Lungs clear to auscultation   CV: Regular rates and rhythm. No ectopy with 30 seconds of auscultation.   GI: Soft, " obese, nontender, Normal active bowel sounds, No hepatosplenomegaly or masses palpable  Ext: Peripheral pulses intact.  Minimal BLE edema.  Neuro: Normal strength and tone, sensory exam grossly normal.  Patient oriented x3.  Appropriate in answering questions.  2 out of 3 recall at 5 minutes  Gen: Tearful talking about the vision of seeing her   and stating that she misses him  but otherwise affect normal    Assessment/Plan: (See plan discussion below for further details)  1. Syncope, unspecified syncope type   Near syncope. Period of delirium resolved after about 10 mi making ? Heart arrhythmia  possible etiology. No seizure activity was seen..   WIll order ZIOpatch to evaluate for arrhythmia.  Patient currently asymptomatic so will defer infectious etiology work-up.  Encouraged to maintain good hydration.   Since pt very hesitant to have anything done, bargained with her to allow for Ziopatch and deferring blood draw for now with assymptomatic state. If recurrent sx, will then order blood work in addition  - Zio Patch Holter Adult Pediatric Greater than 48 hrs; Future    2. Loose stools  Mild and chronic. 1 BM/day. No meg diarrhea and none the day of the above episode. NO recent abx and no travel outside [place]. Will try holding Omeprazole to see if causing    Plan discussion:   Ziopatch at [facility]  Aspirin 81mg daily for heart protection shortterm   Try stopping Omeprazole for a week to see if loose stools improve and car update to clinic [phone]   Call if recurrent episode of dizziness         Aquilion Lilly MD  Internal Medicine Department  [facility]

## 2019-04-30 ENCOUNTER — HOSPITAL ENCOUNTER (OUTPATIENT)
Dept: CARDIOLOGY | Facility: CLINIC | Age: 79
Discharge: HOME | End: 2019-04-30
Attending: INTERNAL MEDICINE | Admitting: INTERNAL MEDICINE
Payer: COMMERCIAL

## 2019-04-30 DIAGNOSIS — R55 SYNCOPE, UNSPECIFIED SYNCOPE TYPE: ICD-10-CM

## 2019-04-30 PROCEDURE — 0298T ZIO PATCH HOLTER ADULT PEDIATRIC GREATER THAN 48 HRS: CPT | Performed by: INTERNAL MEDICINE

## 2019-04-30 PROCEDURE — 0296T ZIO PATCH HOLTER ADULT PEDIATRIC GREATER THAN 48 HRS: CPT

## 2019-05-06 DIAGNOSIS — E78.5 HYPERLIPIDEMIA LDL GOAL <100: ICD-10-CM

## 2019-05-06 NOTE — TELEPHONE ENCOUNTER
"Requested Prescriptions   Pending Prescriptions Disp Refills     pravastatin (PRAVACHOL) 40 MG tablet [Pharmacy Med Name: PRAVASTATIN 40MG TABLETS] 30 tablet 0     Sig: TAKE 1 TABLET BY MOUTH DAILY AT BEDTIME       Statins Protocol Passed - 5/6/2019 11:18 AM        Passed - LDL on file in past 12 months     Recent Labs   Lab Test 03/14/19  1601   LDL 68             Passed - No abnormal creatine kinase in past 12 months     No lab results found.             Passed - Recent (12 mo) or future (30 days) visit within the authorizing provider's specialty     Patient had office visit in the last 12 months or has a visit in the next 30 days with authorizing provider or within the authorizing provider's specialty.  See \"Patient Info\" tab in inbasket, or \"Choose Columns\" in Meds & Orders section of the refill encounter.              Passed - Medication is active on med list        Passed - Patient is age 18 or older        Passed - No active pregnancy on record        Passed - No positive pregnancy test in past 12 months        "

## 2019-05-07 RX ORDER — PRAVASTATIN SODIUM 40 MG
TABLET ORAL
Qty: 90 TABLET | Refills: 2 | Status: SHIPPED | OUTPATIENT
Start: 2019-05-07 | End: 2020-01-11

## 2019-06-06 ENCOUNTER — TELEPHONE (OUTPATIENT)
Dept: INTERNAL MEDICINE | Facility: CLINIC | Age: 79
End: 2019-06-06

## 2019-06-06 DIAGNOSIS — I47.10 SVT (SUPRAVENTRICULAR TACHYCARDIA) (H): Primary | ICD-10-CM

## 2019-06-06 NOTE — TELEPHONE ENCOUNTER
Pt calling for results and advisement on heart monitor she had placed.  Received a letter saying you were sent the results, but nothing about what the results were.

## 2019-06-10 NOTE — TELEPHONE ENCOUNTER
Pt called again about her heart monitor results.  Please call her back.. A detailed message can be left.

## 2019-06-12 NOTE — TELEPHONE ENCOUNTER
First available appt with EP Cardiologist is 6/28/19.  Appt scheduled with Dr Harley on Friday 6/28 at 10:15 AM at [place] [facility] at [facility]. Pt informed. Will arrive at 10:00am

## 2019-06-12 NOTE — TELEPHONE ENCOUNTER
patient said an appt was to be scheduled with cardiology based on results . Please enter referral so we can get her scheduled .Annelise Garza RN

## 2019-06-28 ENCOUNTER — OFFICE VISIT (OUTPATIENT)
Dept: CARDIOLOGY | Facility: CLINIC | Age: 79
End: 2019-06-28
Payer: COMMERCIAL

## 2019-06-28 VITALS
HEART RATE: 71 BPM | DIASTOLIC BLOOD PRESSURE: 72 MMHG | WEIGHT: 226 LBS | SYSTOLIC BLOOD PRESSURE: 113 MMHG | BODY MASS INDEX: 42.67 KG/M2 | HEIGHT: 61 IN

## 2019-06-28 DIAGNOSIS — D64.9 ANEMIA, UNSPECIFIED TYPE: ICD-10-CM

## 2019-06-28 DIAGNOSIS — I47.10 SVT (SUPRAVENTRICULAR TACHYCARDIA) (H): Primary | ICD-10-CM

## 2019-06-28 DIAGNOSIS — I47.10 SVT (SUPRAVENTRICULAR TACHYCARDIA) (H): ICD-10-CM

## 2019-06-28 LAB
BASOPHILS # BLD AUTO: 0 10E9/L (ref 0–0.2)
BASOPHILS NFR BLD AUTO: 0.2 %
DIFFERENTIAL METHOD BLD: ABNORMAL
EOSINOPHIL # BLD AUTO: 0.3 10E9/L (ref 0–0.7)
EOSINOPHIL NFR BLD AUTO: 2.5 %
ERYTHROCYTE [DISTWIDTH] IN BLOOD BY AUTOMATED COUNT: 13.6 % (ref 10–15)
HCT VFR BLD AUTO: 31.4 % (ref 35–47)
HGB BLD-MCNC: 10.2 G/DL (ref 11.7–15.7)
IMM GRANULOCYTES # BLD: 0 10E9/L (ref 0–0.4)
IMM GRANULOCYTES NFR BLD: 0.2 %
LYMPHOCYTES # BLD AUTO: 5.2 10E9/L (ref 0.8–5.3)
LYMPHOCYTES NFR BLD AUTO: 43.2 %
MCH RBC QN AUTO: 33.4 PG (ref 26.5–33)
MCHC RBC AUTO-ENTMCNC: 32.5 G/DL (ref 31.5–36.5)
MCV RBC AUTO: 103 FL (ref 78–100)
MONOCYTES # BLD AUTO: 1.2 10E9/L (ref 0–1.3)
MONOCYTES NFR BLD AUTO: 10.1 %
NEUTROPHILS # BLD AUTO: 5.3 10E9/L (ref 1.6–8.3)
NEUTROPHILS NFR BLD AUTO: 43.8 %
NRBC # BLD AUTO: 0 10*3/UL
NRBC BLD AUTO-RTO: 0 /100
PLATELET # BLD AUTO: 161 10E9/L (ref 150–450)
RBC # BLD AUTO: 3.05 10E12/L (ref 3.8–5.2)
WBC # BLD AUTO: 12.1 10E9/L (ref 4–11)

## 2019-06-28 PROCEDURE — 85025 COMPLETE CBC W/AUTO DIFF WBC: CPT | Performed by: INTERNAL MEDICINE

## 2019-06-28 PROCEDURE — 36415 COLL VENOUS BLD VENIPUNCTURE: CPT | Performed by: INTERNAL MEDICINE

## 2019-06-28 PROCEDURE — 99203 OFFICE O/P NEW LOW 30 MIN: CPT | Performed by: INTERNAL MEDICINE

## 2019-06-28 PROCEDURE — 93000 ELECTROCARDIOGRAM COMPLETE: CPT | Performed by: INTERNAL MEDICINE

## 2019-06-28 RX ORDER — METOPROLOL SUCCINATE 25 MG/1
25 TABLET, EXTENDED RELEASE ORAL DAILY
Qty: 30 TABLET | Refills: 3 | Status: SHIPPED | OUTPATIENT
Start: 2019-06-28 | End: 2019-06-28

## 2019-06-28 RX ORDER — METOPROLOL SUCCINATE 25 MG/1
25 TABLET, EXTENDED RELEASE ORAL DAILY
Qty: 90 TABLET | Refills: 1 | Status: SHIPPED | OUTPATIENT
Start: 2019-06-28 | End: 2019-12-26

## 2019-06-28 ASSESSMENT — MIFFLIN-ST. JEOR: SCORE: 1437.51

## 2019-06-28 NOTE — LETTER
6/28/2019      Aquilino Lilly MD  [address] [facility] [address]      RE: Mahogany Blanco       Dear Colleague,    I had the pleasure of seeing Mahogany Blanco in the [facility].    Service Date: 06/28/2019      HISTORY OF PRESENT ILLNESS:  I saw Ms. Blanco for evaluation of SVT.  She is a 79-year-old white female with a history of hypertension.  She presented to your office with complaint of shortness of breath with exertion recently.  She was put on a ZIO Patch monitor that detected 3183 episodes of SVT up to 16.7 seconds.  She also had intermittent PVC bigeminy.  I did the report of her ZIO Patch tracings and I believe her SVT was likely atrial tachycardia.  The SVT is relatively slow and the patient did not have apparent symptoms.  When she reported her symptoms during the monitoring when she was mostly in sinus rhythm.  She has not had syncope or near-syncope.  She denies chest pain.      PAST MEDICAL HISTORY:  Remarkable for obesity, anemia, chronic renal insufficiency and depression.  She is on thyroid replacement therapy.  The last hemoglobin in March was around 10.      PHYSICAL EXAMINATION:   VITAL SIGNS:  Blood pressure was 113/72, heart rate 71 beats per minute, body weight 226 pounds.   GENERAL:  She walks with a cane.   LUNGS:  Clear.   CARDIAC:  Rhythm was regular and the heart sounds were normal with no murmur.   ABDOMEN:  Showed moderate obesity.   EXTREMITIES:  There was no pedal edema.      EKG showed normal sinus rhythm.      ASSESSMENT AND RECOMMENDATIONS:  Ms. Blanco has a short lasting slow atrial tachycardia.  The arrhythmia was not correlated with her symptoms.  For treatment of that, I prescribed Toprol-XL 25 mg p.o. daily.      For further evaluation of her shortness of breath, she will have an echocardiography.  She had a cardiac nuclear scan in 2017 that reported normal ejection without evidence of myocardial ischemia.      I have ordered a CBC to  make sure that she does not have severe anemia.  I will see her for followup in about 1 month.  By that time, we will decide if she should undergo left and right heart catheterization for further evaluation.      cc:      Aquilino Lilly MD    [facility]   [address] [place] [address]         TELMA BECK MD             D: 2019   T: 2019   MT:       Name:     CHANTE MINAYA   MRN:      [id number]        Account:      [id number]   :      1940           Service Date: 2019      Document: [id number]           Outpatient Encounter Medications as of 2019   Medication Sig Dispense Refill     ACCU-CHEK MULTICLIX LANCETS MISC Use to test blood sugar once daily or as directed.       albuterol (VENTOLIN HFA) 108 (90 Base) MCG/ACT inhaler INHALE 2 PUFFS BY MOUTH INTO THE LUNGS EVERY 4 HOURS AS NEEDED FOR SHORTNESS OF BREATH OR WHEEZING. 18 g 11     blood glucose monitoring (ACCU-CHEK NOELLE) test strip Use to test blood sugars 1 time daily or as directed. 50 each 11     blood glucose monitoring (NO BRAND SPECIFIED) meter device kit Use to test blood sugar one  time daily or as directed. 1 kit 0     blood glucose monitoring (NO BRAND SPECIFIED) test strip Use to test blood sugars one time daily or as directed 100 strip 3     buPROPion (WELLBUTRIN XL) 150 MG 24 hr tablet TAKE 1 TABLET(150 MG) BY MOUTH EVERY MORNING 90 tablet 3     Cholecalciferol (VITAMIN D) 2000 UNITS tablet Take 2,000 Units by mouth daily 100 tablet 3     citalopram (CELEXA) 20 MG tablet Take 1 tablet (20 mg) by mouth daily 90 tablet 3     Cyanocobalamin (B-12) 1000 MCG TBCR Take 1,000 mcg by mouth daily 90 tablet 3     fluticasone (FLONASE) 50 MCG/ACT nasal spray Spray 1-2 sprays into both nostrils daily 16 g 2     levothyroxine (SYNTHROID/LEVOTHROID) 88 MCG tablet Take 1 tablet (88 mcg) by mouth daily 90 tablet 3     lisinopril (PRINIVIL/ZESTRIL) 40 MG tablet TAKE 1 TABLET(40 MG) BY MOUTH DAILY 90 tablet 2      omeprazole (PRILOSEC) 20 MG DR capsule TAKE 1 CAPSULE(20 MG) BY MOUTH DAILY 90 capsule 2     ORDER FOR DME Disp one glucometer that is covered by insurance with 1 RF. Also disp glucometer strips and lancets to check sugars daily. Disp 100 strips and  Lancets  with prn RFs for 1 year 1 Device 11     pravastatin (PRAVACHOL) 40 MG tablet TAKE 1 TABLET BY MOUTH DAILY AT BEDTIME 90 tablet 2     [DISCONTINUED] metoprolol succinate ER (TOPROL XL) 25 MG 24 hr tablet Take 1 tablet (25 mg) by mouth daily 30 tablet 3     No facility-administered encounter medications on file as of 6/28/2019.        Again, thank you for allowing me to participate in the care of your patient.      Sincerely,    Naa Harley MD     [facility]

## 2019-06-28 NOTE — PROGRESS NOTES
HPI and Plan:   See dictation    Orders Placed This Encounter   Procedures     CBC with platelets differential     Follow-Up with Electrophysiologist     EKG 12-lead complete w/read - Clinics (performed today)     Echocardiogram Complete       Orders Placed This Encounter   Medications     metoprolol succinate ER (TOPROL XL) 25 MG 24 hr tablet     Sig: Take 1 tablet (25 mg) by mouth daily     Dispense:  30 tablet     Refill:  3       There are no discontinued medications.      Encounter Diagnoses   Name Primary?     SVT (supraventricular tachycardia) (H) Yes     Anemia, unspecified type        CURRENT MEDICATIONS:  Current Outpatient Medications   Medication Sig Dispense Refill     ACCU-CHEK MULTICLIX LANCETS MISC Use to test blood sugar once daily or as directed.       albuterol (VENTOLIN HFA) 108 (90 Base) MCG/ACT inhaler INHALE 2 PUFFS BY MOUTH INTO THE LUNGS EVERY 4 HOURS AS NEEDED FOR SHORTNESS OF BREATH OR WHEEZING. 18 g 11     blood glucose monitoring (ACCU-CHEK NOELLE) test strip Use to test blood sugars 1 time daily or as directed. 50 each 11     blood glucose monitoring (NO BRAND SPECIFIED) meter device kit Use to test blood sugar one  time daily or as directed. 1 kit 0     blood glucose monitoring (NO BRAND SPECIFIED) test strip Use to test blood sugars one time daily or as directed 100 strip 3     buPROPion (WELLBUTRIN XL) 150 MG 24 hr tablet TAKE 1 TABLET(150 MG) BY MOUTH EVERY MORNING 90 tablet 3     Cholecalciferol (VITAMIN D) 2000 UNITS tablet Take 2,000 Units by mouth daily 100 tablet 3     citalopram (CELEXA) 20 MG tablet Take 1 tablet (20 mg) by mouth daily 90 tablet 3     Cyanocobalamin (B-12) 1000 MCG TBCR Take 1,000 mcg by mouth daily 90 tablet 3     fluticasone (FLONASE) 50 MCG/ACT nasal spray Spray 1-2 sprays into both nostrils daily 16 g 2     levothyroxine (SYNTHROID/LEVOTHROID) 88 MCG tablet Take 1 tablet (88 mcg) by mouth daily 90 tablet 3     lisinopril (PRINIVIL/ZESTRIL) 40 MG tablet  TAKE 1 TABLET(40 MG) BY MOUTH DAILY 90 tablet 2     metoprolol succinate ER (TOPROL XL) 25 MG 24 hr tablet Take 1 tablet (25 mg) by mouth daily 30 tablet 3     omeprazole (PRILOSEC) 20 MG DR capsule TAKE 1 CAPSULE(20 MG) BY MOUTH DAILY 90 capsule 2     ORDER FOR DME Disp one glucometer that is covered by insurance with 1 RF. Also disp glucometer strips and lancets to check sugars daily. Disp 100 strips and  Lancets  with prn RFs for 1 year 1 Device 11     pravastatin (PRAVACHOL) 40 MG tablet TAKE 1 TABLET BY MOUTH DAILY AT BEDTIME 90 tablet 2       ALLERGIES     Allergies   Allergen Reactions     Codeine      Tylenol #3     Metformin      Diarrhea         PAST MEDICAL HISTORY:  Past Medical History:   Diagnosis Date     Actinic keratosis 10/09    treated with liquids NO2     Anemia 5/21/2013     B12 deficiency 1/28/2018     Cancer (H) Summer 2013    basal cell cancer 2 spots removed     Carpal tunnel syndrome      Chronic renal insufficiency      CKD (chronic kidney disease) stage 3, GFR 30-59 ml/min (H)      Elevated antinuclear antibody (JACINTO) level 9/22/2017     Esophageal reflux      Essential hypertension, benign      Gout 4/29/2015     Hyperlipidemia LDL goal <100       Lung nodule 08/29/2017    left side, 8mm. Needs repeat CT chest Aug 2018     Major depression       MGUS (monoclonal gammopathy of unknown significance) 9/22/2017     Morbid obesity (H) 10/9/2015     Thrombocytopenia (H) 1/28/2018     Type 2 diabetes mellitus with diabetic chronic kidney disease  (goal A1C <7) 10/9/2015     Unspecified cataract 3/03    bilateral     Unspecified hypothyroidism      Unspecified sleep apnea      Vitamin D deficiency 11/12/2012       PAST SURGICAL HISTORY:  Past Surgical History:   Procedure Laterality Date     BONE MARROW BIOPSY, BONE SPECIMEN, NEEDLE/TROCAR  7/9/2014    Procedure: BIOPSY BONE MARROW;  Surgeon: Tony Coleman MD;  Location:  GI     C NONSPECIFIC PROCEDURE      ventral hernia repair     C  NONSPECIFIC PROCEDURE      cholecystectomy     C NONSPECIFIC PROCEDURE      umbilical hernia repair     C NONSPECIFIC PROCEDURE      right carpal tunnel syndrome repair     C NONSPECIFIC PROCEDURE  10/00    stress test (negative)     C NONSPECIFIC PROCEDURE      left cataract extraction     CHOLECYSTECTOMY         FAMILY HISTORY:  Family History   Problem Relation Age of Onset     Cancer Sister         colon cancer     Cancer Sister         left leg cancer, leg amputation     Cancer Sister         skin cancer     Gastrointestinal Disease Sister         liver disease (not cancer)     Musculoskeletal Disorder Daughter         rheumatoid arthritis     Cancer Mother          age 79, lung cancer     Cancer Father          age 81, colon cancer       SOCIAL HISTORY:  Social History     Socioeconomic History     Marital status:      Spouse name: None     Number of children: None     Years of education: None     Highest education level: None   Occupational History     None   Social Needs     Financial resource strain: None     Food insecurity:     Worry: None     Inability: None     Transportation needs:     Medical: None     Non-medical: None   Tobacco Use     Smoking status: Former Smoker     Packs/day: 1.50     Years: 25.00     Pack years: 37.50     Last attempt to quit: 10/14/1996     Years since quittin.7     Smokeless tobacco: Never Used   Substance and Sexual Activity     Alcohol use: Yes     Comment: rare     Drug use: No     Sexual activity: Not Currently   Lifestyle     Physical activity:     Days per week: None     Minutes per session: None     Stress: None   Relationships     Social connections:     Talks on phone: None     Gets together: None     Attends Taoism service: None     Active member of club or organization: None     Attends meetings of clubs or organizations: None     Relationship status: None     Intimate partner violence:     Fear of current or ex partner: None      "Emotionally abused: None     Physically abused: None     Forced sexual activity: None   Other Topics Concern     Parent/sibling w/ CABG, MI or angioplasty before 65F 55M? Not Asked   Social History Narrative     None       Review of Systems:  Skin:  Negative       Eyes:  Positive for glasses    ENT:  Negative      Respiratory:  Positive for dyspnea on exertion;shortness of breath;dyspnea at rest     Cardiovascular:    fatigue;Positive for;palpitations;chest pain    Gastroenterology: Positive for reflux    Genitourinary:  Negative      Musculoskeletal:  Positive for back pain;arthritis    Neurologic:  Positive for headaches    Psychiatric:  Positive for depression;anxiety    Heme/Lymph/Imm:  Positive for   thrombocytopenia  Endocrine:  Positive for diabetes;thyroid disorder      Physical Exam:  Vitals: /72   Pulse 71   Ht 1.549 m (5' 1\")   Wt 102.5 kg (226 lb)   LMP 06/16/1985 (Approximate)   BMI 42.70 kg/m      Constitutional:  cooperative, alert and oriented, well developed, well nourished, in no acute distress        Skin:  warm and dry to the touch, no apparent skin lesions or masses noted          Head:  normocephalic, no masses or lesions        Eyes:  pupils equal and round, conjunctivae and lids unremarkable, sclera white, no xanthalasma, EOMS intact, no nystagmus        Lymph:No Cervical lymphadenopathy present     ENT:  no pallor or cyanosis, dentition good        Neck:  carotid pulses are full and equal bilaterally, JVP normal, no carotid bruit        Respiratory:  normal breath sounds, clear to auscultation, normal A-P diameter, normal symmetry, normal respiratory excursion, no use of accessory muscles         Cardiac: regular rhythm, normal S1/S2, no S3 or S4, apical impulse not displaced, no murmurs, gallops or rubs                                                         GI:  abdomen soft, non-tender, BS normoactive, no mass, no HSM, no bruits        Extremities and Muscular Skeletal:  no " deformities, clubbing, cyanosis, erythema observed              Neurological:  no gross motor deficits        Psych:  Alert and Oriented x 3        CC  Aquilino Lilly MD  [address] [place] [address]

## 2019-06-28 NOTE — PROGRESS NOTES
Service Date: 06/28/2019      HISTORY OF PRESENT ILLNESS:  I saw Ms. Blanco for evaluation of SVT.  She is a 79-year-old white female with a history of hypertension.  She presented to your office with complaint of shortness of breath with exertion recently.  She was put on a ZIO Patch monitor that detected 3183 episodes of SVT up to 16.7 seconds.  She also had intermittent PVC bigeminy.  I did the report of her ZIO Patch tracings and I believe her SVT was likely atrial tachycardia.  The SVT is relatively slow and the patient did not have apparent symptoms.  When she reported her symptoms during the monitoring when she was mostly in sinus rhythm.  She has not had syncope or near-syncope.  She denies chest pain.      PAST MEDICAL HISTORY:  Remarkable for obesity, anemia, chronic renal insufficiency and depression.  She is on thyroid replacement therapy.  The last hemoglobin in March was around 10.      PHYSICAL EXAMINATION:   VITAL SIGNS:  Blood pressure was 113/72, heart rate 71 beats per minute, body weight 226 pounds.   GENERAL:  She walks with a cane.   LUNGS:  Clear.   CARDIAC:  Rhythm was regular and the heart sounds were normal with no murmur.   ABDOMEN:  Showed moderate obesity.   EXTREMITIES:  There was no pedal edema.      EKG showed normal sinus rhythm.      ASSESSMENT AND RECOMMENDATIONS:  Ms. Blanco has a short lasting slow atrial tachycardia.  The arrhythmia was not correlated with her symptoms.  For treatment of that, I prescribed Toprol-XL 25 mg p.o. daily.      For further evaluation of her shortness of breath, she will have an echocardiography.  She had a cardiac nuclear scan in 2017 that reported normal ejection without evidence of myocardial ischemia.      I have ordered a CBC to make sure that she does not have severe anemia.  I will see her for followup in about 1 month.  By that time, we will decide if she should undergo left and right heart catheterization for further evaluation.      cc:       Aquilino Lilly MD    [facility]   [address] [place] [id number]         TELMA BECK MD             D: 2019   T: 2019   MT: ELI      Name:     CHANTE MINAYA   MRN:      [id number]        Account:      [id number]   :      1940           Service Date: 2019      Document: [id number]

## 2019-06-28 NOTE — PROGRESS NOTES
HPI and Plan:   See dictation    Orders Placed This Encounter   Procedures     CBC with platelets differential     Follow-Up with Electrophysiologist     EKG 12-lead complete w/read - Clinics (performed today)     Echocardiogram Complete       Orders Placed This Encounter   Medications     metoprolol succinate ER (TOPROL XL) 25 MG 24 hr tablet     Sig: Take 1 tablet (25 mg) by mouth daily     Dispense:  30 tablet     Refill:  3       There are no discontinued medications.      Encounter Diagnoses   Name Primary?     SVT (supraventricular tachycardia) (H) Yes     Anemia, unspecified type        CURRENT MEDICATIONS:  Current Outpatient Medications   Medication Sig Dispense Refill     ACCU-CHEK MULTICLIX LANCETS MISC Use to test blood sugar once daily or as directed.       albuterol (VENTOLIN HFA) 108 (90 Base) MCG/ACT inhaler INHALE 2 PUFFS BY MOUTH INTO THE LUNGS EVERY 4 HOURS AS NEEDED FOR SHORTNESS OF BREATH OR WHEEZING. 18 g 11     blood glucose monitoring (ACCU-CHEK NOELLE) test strip Use to test blood sugars 1 time daily or as directed. 50 each 11     blood glucose monitoring (NO BRAND SPECIFIED) meter device kit Use to test blood sugar one  time daily or as directed. 1 kit 0     blood glucose monitoring (NO BRAND SPECIFIED) test strip Use to test blood sugars one time daily or as directed 100 strip 3     buPROPion (WELLBUTRIN XL) 150 MG 24 hr tablet TAKE 1 TABLET(150 MG) BY MOUTH EVERY MORNING 90 tablet 3     Cholecalciferol (VITAMIN D) 2000 UNITS tablet Take 2,000 Units by mouth daily 100 tablet 3     citalopram (CELEXA) 20 MG tablet Take 1 tablet (20 mg) by mouth daily 90 tablet 3     Cyanocobalamin (B-12) 1000 MCG TBCR Take 1,000 mcg by mouth daily 90 tablet 3     fluticasone (FLONASE) 50 MCG/ACT nasal spray Spray 1-2 sprays into both nostrils daily 16 g 2     levothyroxine (SYNTHROID/LEVOTHROID) 88 MCG tablet Take 1 tablet (88 mcg) by mouth daily 90 tablet 3     lisinopril (PRINIVIL/ZESTRIL) 40 MG tablet  TAKE 1 TABLET(40 MG) BY MOUTH DAILY 90 tablet 2     metoprolol succinate ER (TOPROL XL) 25 MG 24 hr tablet Take 1 tablet (25 mg) by mouth daily 30 tablet 3     omeprazole (PRILOSEC) 20 MG DR capsule TAKE 1 CAPSULE(20 MG) BY MOUTH DAILY 90 capsule 2     ORDER FOR DME Disp one glucometer that is covered by insurance with 1 RF. Also disp glucometer strips and lancets to check sugars daily. Disp 100 strips and  Lancets  with prn RFs for 1 year 1 Device 11     pravastatin (PRAVACHOL) 40 MG tablet TAKE 1 TABLET BY MOUTH DAILY AT BEDTIME 90 tablet 2       ALLERGIES     Allergies   Allergen Reactions     Codeine      Tylenol #3     Metformin      Diarrhea         PAST MEDICAL HISTORY:  Past Medical History:   Diagnosis Date     Actinic keratosis 10/09    treated with liquids NO2     Anemia 5/21/2013     B12 deficiency 1/28/2018     Cancer (H) Summer 2013    basal cell cancer 2 spots removed     Carpal tunnel syndrome      Chronic renal insufficiency      CKD (chronic kidney disease) stage 3, GFR 30-59 ml/min (H)      Elevated antinuclear antibody (JACINTO) level 9/22/2017     Esophageal reflux      Essential hypertension, benign      Gout 4/29/2015     Hyperlipidemia LDL goal <100       Lung nodule 08/29/2017    left side, 8mm. Needs repeat CT chest Aug 2018     Major depression       MGUS (monoclonal gammopathy of unknown significance) 9/22/2017     Morbid obesity (H) 10/9/2015     Thrombocytopenia (H) 1/28/2018     Type 2 diabetes mellitus with diabetic chronic kidney disease  (goal A1C <7) 10/9/2015     Unspecified cataract 3/03    bilateral     Unspecified hypothyroidism      Unspecified sleep apnea      Vitamin D deficiency 11/12/2012       PAST SURGICAL HISTORY:  Past Surgical History:   Procedure Laterality Date     BONE MARROW BIOPSY, BONE SPECIMEN, NEEDLE/TROCAR  7/9/2014    Procedure: BIOPSY BONE MARROW;  Surgeon: Tony Coleman MD;  Location:  GI     C NONSPECIFIC PROCEDURE      ventral hernia repair     C  NONSPECIFIC PROCEDURE      cholecystectomy     C NONSPECIFIC PROCEDURE      umbilical hernia repair     C NONSPECIFIC PROCEDURE      right carpal tunnel syndrome repair     C NONSPECIFIC PROCEDURE  10/00    stress test (negative)     C NONSPECIFIC PROCEDURE      left cataract extraction     CHOLECYSTECTOMY         FAMILY HISTORY:  Family History   Problem Relation Age of Onset     Cancer Sister         colon cancer     Cancer Sister         left leg cancer, leg amputation     Cancer Sister         skin cancer     Gastrointestinal Disease Sister         liver disease (not cancer)     Musculoskeletal Disorder Daughter         rheumatoid arthritis     Cancer Mother          age 79, lung cancer     Cancer Father          age 81, colon cancer       SOCIAL HISTORY:  Social History     Socioeconomic History     Marital status:      Spouse name: None     Number of children: None     Years of education: None     Highest education level: None   Occupational History     None   Social Needs     Financial resource strain: None     Food insecurity:     Worry: None     Inability: None     Transportation needs:     Medical: None     Non-medical: None   Tobacco Use     Smoking status: Former Smoker     Packs/day: 1.50     Years: 25.00     Pack years: 37.50     Last attempt to quit: 10/14/1996     Years since quittin.7     Smokeless tobacco: Never Used   Substance and Sexual Activity     Alcohol use: Yes     Comment: rare     Drug use: No     Sexual activity: Not Currently   Lifestyle     Physical activity:     Days per week: None     Minutes per session: None     Stress: None   Relationships     Social connections:     Talks on phone: None     Gets together: None     Attends Mandaen service: None     Active member of club or organization: None     Attends meetings of clubs or organizations: None     Relationship status: None     Intimate partner violence:     Fear of current or ex partner: None      "Emotionally abused: None     Physically abused: None     Forced sexual activity: None   Other Topics Concern     Parent/sibling w/ CABG, MI or angioplasty before 65F 55M? Not Asked   Social History Narrative     None       Review of Systems:  Skin:  Negative       Eyes:  Positive for glasses    ENT:  Negative      Respiratory:  Positive for dyspnea on exertion;shortness of breath;dyspnea at rest     Cardiovascular:    fatigue;Positive for;palpitations;chest pain    Gastroenterology: Positive for reflux    Genitourinary:  Negative      Musculoskeletal:  Positive for back pain;arthritis    Neurologic:  Positive for headaches    Psychiatric:  Positive for depression;anxiety    Heme/Lymph/Imm:  Positive for   thrombocytopenia  Endocrine:  Positive for diabetes;thyroid disorder      Physical Exam:  Vitals: /72   Pulse 71   Ht 1.549 m (5' 1\")   Wt 102.5 kg (226 lb)   LMP 06/16/1985 (Approximate)   BMI 42.70 kg/m      Constitutional:  cooperative, alert and oriented, well developed, well nourished, in no acute distress        Skin:  warm and dry to the touch, no apparent skin lesions or masses noted          Head:  normocephalic, no masses or lesions        Eyes:  pupils equal and round, conjunctivae and lids unremarkable, sclera white, no xanthalasma, EOMS intact, no nystagmus        Lymph:No Cervical lymphadenopathy present     ENT:  no pallor or cyanosis, dentition good        Neck:  carotid pulses are full and equal bilaterally, JVP normal, no carotid bruit        Respiratory:  normal breath sounds, clear to auscultation, normal A-P diameter, normal symmetry, normal respiratory excursion, no use of accessory muscles         Cardiac: regular rhythm, normal S1/S2, no S3 or S4, apical impulse not displaced, no murmurs, gallops or rubs                                                         GI:  abdomen soft, non-tender, BS normoactive, no mass, no HSM, no bruits        Extremities and Muscular Skeletal:  no " deformities, clubbing, cyanosis, erythema observed              Neurological:  no gross motor deficits        Psych:  Alert and Oriented x 3        CC  Aquilino Lilly MD  [address] [place] [address]

## 2019-06-28 NOTE — LETTER
6/28/2019    Aquilino Lilly MD  [address] [facility] [address]    RE: Mahogany F Francis       Dear Colleague,    I had the pleasure of seeing Mahogany Blanco in the [facility].    HPI and Plan:   See dictation    Orders Placed This Encounter   Procedures     CBC with platelets differential     Follow-Up with Electrophysiologist     EKG 12-lead complete w/read - Clinics (performed today)     Echocardiogram Complete       Orders Placed This Encounter   Medications     metoprolol succinate ER (TOPROL XL) 25 MG 24 hr tablet     Sig: Take 1 tablet (25 mg) by mouth daily     Dispense:  30 tablet     Refill:  3       There are no discontinued medications.      Encounter Diagnoses   Name Primary?     SVT (supraventricular tachycardia) (H) Yes     Anemia, unspecified type        CURRENT MEDICATIONS:  Current Outpatient Medications   Medication Sig Dispense Refill     ACCU-CHEK MULTICLIX LANCETS MISC Use to test blood sugar once daily or as directed.       albuterol (VENTOLIN HFA) 108 (90 Base) MCG/ACT inhaler INHALE 2 PUFFS BY MOUTH INTO THE LUNGS EVERY 4 HOURS AS NEEDED FOR SHORTNESS OF BREATH OR WHEEZING. 18 g 11     blood glucose monitoring (ACCU-CHEK NOELLE) test strip Use to test blood sugars 1 time daily or as directed. 50 each 11     blood glucose monitoring (NO BRAND SPECIFIED) meter device kit Use to test blood sugar one  time daily or as directed. 1 kit 0     blood glucose monitoring (NO BRAND SPECIFIED) test strip Use to test blood sugars one time daily or as directed 100 strip 3     buPROPion (WELLBUTRIN XL) 150 MG 24 hr tablet TAKE 1 TABLET(150 MG) BY MOUTH EVERY MORNING 90 tablet 3     Cholecalciferol (VITAMIN D) 2000 UNITS tablet Take 2,000 Units by mouth daily 100 tablet 3     citalopram (CELEXA) 20 MG tablet Take 1 tablet (20 mg) by mouth daily 90 tablet 3     Cyanocobalamin (B-12) 1000 MCG TBCR Take 1,000 mcg by mouth daily 90 tablet 3     fluticasone (FLONASE) 50 MCG/ACT  nasal spray Spray 1-2 sprays into both nostrils daily 16 g 2     levothyroxine (SYNTHROID/LEVOTHROID) 88 MCG tablet Take 1 tablet (88 mcg) by mouth daily 90 tablet 3     lisinopril (PRINIVIL/ZESTRIL) 40 MG tablet TAKE 1 TABLET(40 MG) BY MOUTH DAILY 90 tablet 2     metoprolol succinate ER (TOPROL XL) 25 MG 24 hr tablet Take 1 tablet (25 mg) by mouth daily 30 tablet 3     omeprazole (PRILOSEC) 20 MG DR capsule TAKE 1 CAPSULE(20 MG) BY MOUTH DAILY 90 capsule 2     ORDER FOR DME Disp one glucometer that is covered by insurance with 1 RF. Also disp glucometer strips and lancets to check sugars daily. Disp 100 strips and  Lancets  with prn RFs for 1 year 1 Device 11     pravastatin (PRAVACHOL) 40 MG tablet TAKE 1 TABLET BY MOUTH DAILY AT BEDTIME 90 tablet 2       ALLERGIES     Allergies   Allergen Reactions     Codeine      Tylenol #3     Metformin      Diarrhea         PAST MEDICAL HISTORY:  Past Medical History:   Diagnosis Date     Actinic keratosis 10/09    treated with liquids NO2     Anemia 5/21/2013     B12 deficiency 1/28/2018     Cancer (H) Summer 2013    basal cell cancer 2 spots removed     Carpal tunnel syndrome      Chronic renal insufficiency      CKD (chronic kidney disease) stage 3, GFR 30-59 ml/min (H)      Elevated antinuclear antibody (JACINTO) level 9/22/2017     Esophageal reflux      Essential hypertension, benign      Gout 4/29/2015     Hyperlipidemia LDL goal <100       Lung nodule 08/29/2017    left side, 8mm. Needs repeat CT chest Aug 2018     Major depression       MGUS (monoclonal gammopathy of unknown significance) 9/22/2017     Morbid obesity (H) 10/9/2015     Thrombocytopenia (H) 1/28/2018     Type 2 diabetes mellitus with diabetic chronic kidney disease  (goal A1C <7) 10/9/2015     Unspecified cataract 3/03    bilateral     Unspecified hypothyroidism      Unspecified sleep apnea      Vitamin D deficiency 11/12/2012       PAST SURGICAL HISTORY:  Past Surgical History:   Procedure Laterality Date      BONE MARROW BIOPSY, BONE SPECIMEN, NEEDLE/TROCAR  2014    Procedure: BIOPSY BONE MARROW;  Surgeon: Tony Coleman MD;  Location: [facility]     C NONSPECIFIC PROCEDURE      ventral hernia repair     C NONSPECIFIC PROCEDURE      cholecystectomy     C NONSPECIFIC PROCEDURE      umbilical hernia repair     C NONSPECIFIC PROCEDURE      right carpal tunnel syndrome repair     C NONSPECIFIC PROCEDURE  10/00    stress test (negative)     C NONSPECIFIC PROCEDURE      left cataract extraction     CHOLECYSTECTOMY         FAMILY HISTORY:  Family History   Problem Relation Age of Onset     Cancer Sister         colon cancer     Cancer Sister         left leg cancer, leg amputation     Cancer Sister         skin cancer     Gastrointestinal Disease Sister         liver disease (not cancer)     Musculoskeletal Disorder Daughter         rheumatoid arthritis     Cancer Mother          age 79, lung cancer     Cancer Father          age 81, colon cancer       SOCIAL HISTORY:  Social History     Socioeconomic History     Marital status:      Spouse name: None     Number of children: None     Years of education: None     Highest education level: None   Occupational History     None   Social Needs     Financial resource strain: None     Food insecurity:     Worry: None     Inability: None     Transportation needs:     Medical: None     Non-medical: None   Tobacco Use     Smoking status: Former Smoker     Packs/day: 1.50     Years: 25.00     Pack years: 37.50     Last attempt to quit: 10/14/1996     Years since quittin.7     Smokeless tobacco: Never Used   Substance and Sexual Activity     Alcohol use: Yes     Comment: rare     Drug use: No     Sexual activity: Not Currently   Lifestyle     Physical activity:     Days per week: None     Minutes per session: None     Stress: None   Relationships     Social connections:     Talks on phone: None     Gets together: None     Attends Jain service:  "None     Active member of club or organization: None     Attends meetings of clubs or organizations: None     Relationship status: None     Intimate partner violence:     Fear of current or ex partner: None     Emotionally abused: None     Physically abused: None     Forced sexual activity: None   Other Topics Concern     Parent/sibling w/ CABG, MI or angioplasty before 65F 55M? Not Asked   Social History Narrative     None       Review of Systems:  Skin:  Negative       Eyes:  Positive for glasses    ENT:  Negative      Respiratory:  Positive for dyspnea on exertion;shortness of breath;dyspnea at rest     Cardiovascular:    fatigue;Positive for;palpitations;chest pain    Gastroenterology: Positive for reflux    Genitourinary:  Negative      Musculoskeletal:  Positive for back pain;arthritis    Neurologic:  Positive for headaches    Psychiatric:  Positive for depression;anxiety    Heme/Lymph/Imm:  Positive for   thrombocytopenia  Endocrine:  Positive for diabetes;thyroid disorder      Physical Exam:  Vitals: /72   Pulse 71   Ht 1.549 m (5' 1\")   Wt 102.5 kg (226 lb)   LMP 06/16/1985 (Approximate)   BMI 42.70 kg/m       Constitutional:  cooperative, alert and oriented, well developed, well nourished, in no acute distress        Skin:  warm and dry to the touch, no apparent skin lesions or masses noted          Head:  normocephalic, no masses or lesions        Eyes:  pupils equal and round, conjunctivae and lids unremarkable, sclera white, no xanthalasma, EOMS intact, no nystagmus        Lymph:No Cervical lymphadenopathy present     ENT:  no pallor or cyanosis, dentition good        Neck:  carotid pulses are full and equal bilaterally, JVP normal, no carotid bruit        Respiratory:  normal breath sounds, clear to auscultation, normal A-P diameter, normal symmetry, normal respiratory excursion, no use of accessory muscles         Cardiac: regular rhythm, normal S1/S2, no S3 or S4, apical impulse not " displaced, no murmurs, gallops or rubs                                                         GI:  abdomen soft, non-tender, BS normoactive, no mass, no HSM, no bruits        Extremities and Muscular Skeletal:  no deformities, clubbing, cyanosis, erythema observed              Neurological:  no gross motor deficits        Psych:  Alert and Oriented x 3        CC  Aquilino Lilly MD  [address] [place] [address]                HPI and Plan:   See dictation    Orders Placed This Encounter   Procedures     CBC with platelets differential     Follow-Up with Electrophysiologist     EKG 12-lead complete w/read - Clinics (performed today)     Echocardiogram Complete       Orders Placed This Encounter   Medications     metoprolol succinate ER (TOPROL XL) 25 MG 24 hr tablet     Sig: Take 1 tablet (25 mg) by mouth daily     Dispense:  30 tablet     Refill:  3       There are no discontinued medications.      Encounter Diagnoses   Name Primary?     SVT (supraventricular tachycardia) (H) Yes     Anemia, unspecified type        CURRENT MEDICATIONS:  Current Outpatient Medications   Medication Sig Dispense Refill     ACCU-CHEK MULTICLIX LANCETS MISC Use to test blood sugar once daily or as directed.       albuterol (VENTOLIN HFA) 108 (90 Base) MCG/ACT inhaler INHALE 2 PUFFS BY MOUTH INTO THE LUNGS EVERY 4 HOURS AS NEEDED FOR SHORTNESS OF BREATH OR WHEEZING. 18 g 11     blood glucose monitoring (ACCU-CHEK NOELLE) test strip Use to test blood sugars 1 time daily or as directed. 50 each 11     blood glucose monitoring (NO BRAND SPECIFIED) meter device kit Use to test blood sugar one  time daily or as directed. 1 kit 0     blood glucose monitoring (NO BRAND SPECIFIED) test strip Use to test blood sugars one time daily or as directed 100 strip 3     buPROPion (WELLBUTRIN XL) 150 MG 24 hr tablet TAKE 1 TABLET(150 MG) BY MOUTH EVERY MORNING 90 tablet 3     Cholecalciferol (VITAMIN D) 2000 UNITS tablet Take 2,000 Units by mouth  daily 100 tablet 3     citalopram (CELEXA) 20 MG tablet Take 1 tablet (20 mg) by mouth daily 90 tablet 3     Cyanocobalamin (B-12) 1000 MCG TBCR Take 1,000 mcg by mouth daily 90 tablet 3     fluticasone (FLONASE) 50 MCG/ACT nasal spray Spray 1-2 sprays into both nostrils daily 16 g 2     levothyroxine (SYNTHROID/LEVOTHROID) 88 MCG tablet Take 1 tablet (88 mcg) by mouth daily 90 tablet 3     lisinopril (PRINIVIL/ZESTRIL) 40 MG tablet TAKE 1 TABLET(40 MG) BY MOUTH DAILY 90 tablet 2     metoprolol succinate ER (TOPROL XL) 25 MG 24 hr tablet Take 1 tablet (25 mg) by mouth daily 30 tablet 3     omeprazole (PRILOSEC) 20 MG DR capsule TAKE 1 CAPSULE(20 MG) BY MOUTH DAILY 90 capsule 2     ORDER FOR DME Disp one glucometer that is covered by insurance with 1 RF. Also disp glucometer strips and lancets to check sugars daily. Disp 100 strips and  Lancets  with prn RFs for 1 year 1 Device 11     pravastatin (PRAVACHOL) 40 MG tablet TAKE 1 TABLET BY MOUTH DAILY AT BEDTIME 90 tablet 2       ALLERGIES     Allergies   Allergen Reactions     Codeine      Tylenol #3     Metformin      Diarrhea         PAST MEDICAL HISTORY:  Past Medical History:   Diagnosis Date     Actinic keratosis 10/09    treated with liquids NO2     Anemia 5/21/2013     B12 deficiency 1/28/2018     Cancer (H) Summer 2013    basal cell cancer 2 spots removed     Carpal tunnel syndrome      Chronic renal insufficiency      CKD (chronic kidney disease) stage 3, GFR 30-59 ml/min (H)      Elevated antinuclear antibody (JACINTO) level 9/22/2017     Esophageal reflux      Essential hypertension, benign      Gout 4/29/2015     Hyperlipidemia LDL goal <100       Lung nodule 08/29/2017    left side, 8mm. Needs repeat CT chest Aug 2018     Major depression       MGUS (monoclonal gammopathy of unknown significance) 9/22/2017     Morbid obesity (H) 10/9/2015     Thrombocytopenia (H) 1/28/2018     Type 2 diabetes mellitus with diabetic chronic kidney disease  (goal A1C <7)  10/9/2015     Unspecified cataract 3/03    bilateral     Unspecified hypothyroidism      Unspecified sleep apnea      Vitamin D deficiency 2012       PAST SURGICAL HISTORY:  Past Surgical History:   Procedure Laterality Date     BONE MARROW BIOPSY, BONE SPECIMEN, NEEDLE/TROCAR  2014    Procedure: BIOPSY BONE MARROW;  Surgeon: Tony Coleman MD;  Location: [facility]     C NONSPECIFIC PROCEDURE      ventral hernia repair     C NONSPECIFIC PROCEDURE      cholecystectomy     C NONSPECIFIC PROCEDURE      umbilical hernia repair     C NONSPECIFIC PROCEDURE      right carpal tunnel syndrome repair     C NONSPECIFIC PROCEDURE  10/00    stress test (negative)     C NONSPECIFIC PROCEDURE      left cataract extraction     CHOLECYSTECTOMY         FAMILY HISTORY:  Family History   Problem Relation Age of Onset     Cancer Sister         colon cancer     Cancer Sister         left leg cancer, leg amputation     Cancer Sister         skin cancer     Gastrointestinal Disease Sister         liver disease (not cancer)     Musculoskeletal Disorder Daughter         rheumatoid arthritis     Cancer Mother          age 79, lung cancer     Cancer Father          age 81, colon cancer       SOCIAL HISTORY:  Social History     Socioeconomic History     Marital status:      Spouse name: None     Number of children: None     Years of education: None     Highest education level: None   Occupational History     None   Social Needs     Financial resource strain: None     Food insecurity:     Worry: None     Inability: None     Transportation needs:     Medical: None     Non-medical: None   Tobacco Use     Smoking status: Former Smoker     Packs/day: 1.50     Years: 25.00     Pack years: 37.50     Last attempt to quit: 10/14/1996     Years since quittin.7     Smokeless tobacco: Never Used   Substance and Sexual Activity     Alcohol use: Yes     Comment: rare     Drug use: No     Sexual activity: Not  "Currently   Lifestyle     Physical activity:     Days per week: None     Minutes per session: None     Stress: None   Relationships     Social connections:     Talks on phone: None     Gets together: None     Attends Mu-ism service: None     Active member of club or organization: None     Attends meetings of clubs or organizations: None     Relationship status: None     Intimate partner violence:     Fear of current or ex partner: None     Emotionally abused: None     Physically abused: None     Forced sexual activity: None   Other Topics Concern     Parent/sibling w/ CABG, MI or angioplasty before 65F 55M? Not Asked   Social History Narrative     None       Review of Systems:  Skin:  Negative       Eyes:  Positive for glasses    ENT:  Negative      Respiratory:  Positive for dyspnea on exertion;shortness of breath;dyspnea at rest     Cardiovascular:    fatigue;Positive for;palpitations;chest pain    Gastroenterology: Positive for reflux    Genitourinary:  Negative      Musculoskeletal:  Positive for back pain;arthritis    Neurologic:  Positive for headaches    Psychiatric:  Positive for depression;anxiety    Heme/Lymph/Imm:  Positive for   thrombocytopenia  Endocrine:  Positive for diabetes;thyroid disorder      Physical Exam:  Vitals: /72   Pulse 71   Ht 1.549 m (5' 1\")   Wt 102.5 kg (226 lb)   LMP 06/16/1985 (Approximate)   BMI 42.70 kg/m       Constitutional:  cooperative, alert and oriented, well developed, well nourished, in no acute distress        Skin:  warm and dry to the touch, no apparent skin lesions or masses noted          Head:  normocephalic, no masses or lesions        Eyes:  pupils equal and round, conjunctivae and lids unremarkable, sclera white, no xanthalasma, EOMS intact, no nystagmus        Lymph:No Cervical lymphadenopathy present     ENT:  no pallor or cyanosis, dentition good        Neck:  carotid pulses are full and equal bilaterally, JVP normal, no carotid bruit    "     Respiratory:  normal breath sounds, clear to auscultation, normal A-P diameter, normal symmetry, normal respiratory excursion, no use of accessory muscles         Cardiac: regular rhythm, normal S1/S2, no S3 or S4, apical impulse not displaced, no murmurs, gallops or rubs                                                         GI:  abdomen soft, non-tender, BS normoactive, no mass, no HSM, no bruits        Extremities and Muscular Skeletal:  no deformities, clubbing, cyanosis, erythema observed              Neurological:  no gross motor deficits        Psych:  Alert and Oriented x 3        CC  Aquilino Lilly MD  [facility] [address] [place] [address]                Thank you for allowing me to participate in the care of your patient.      Sincerely,     Naa Harley MD     [facility]    cc:   Aquilino Lilly MD  [facility] [address] [place], [place] [address]

## 2019-07-03 ENCOUNTER — HOSPITAL ENCOUNTER (OUTPATIENT)
Dept: CARDIOLOGY | Facility: CLINIC | Age: 79
Discharge: HOME | End: 2019-07-03
Attending: INTERNAL MEDICINE | Admitting: INTERNAL MEDICINE
Payer: COMMERCIAL

## 2019-07-03 DIAGNOSIS — I47.10 SVT (SUPRAVENTRICULAR TACHYCARDIA) (H): ICD-10-CM

## 2019-07-03 PROCEDURE — 25500064 ZZH RX 255 OP 636: Performed by: INTERNAL MEDICINE

## 2019-07-03 PROCEDURE — 93306 TTE W/DOPPLER COMPLETE: CPT | Mod: 26 | Performed by: INTERNAL MEDICINE

## 2019-07-03 PROCEDURE — 40000264 ECHOCARDIOGRAM COMPLETE

## 2019-07-03 RX ADMIN — HUMAN ALBUMIN MICROSPHERES AND PERFLUTREN 5 ML: 10; .22 INJECTION, SOLUTION INTRAVENOUS at 12:15

## 2019-07-10 ENCOUNTER — TELEPHONE (OUTPATIENT)
Dept: CARDIOLOGY | Facility: CLINIC | Age: 79
End: 2019-07-10

## 2019-07-10 NOTE — TELEPHONE ENCOUNTER
"Pt had CBC drawn recently, ordered by Dr. Harley after recent OV. Per Dr. Harley's OV note on 6/28/2019: \"I have ordered a CBC to make sure that she does not have severe anemia.\"    CBC showed Hgb of 10.2, which is exactly the same as last CBC in March 2019. Called pt and gave her results. Told her I would review with Dr. Harley, who is out of the office for a few weeks, but I anticipate no changes. She states understanding.     While on the phone with pt, I saw that she also had an echo done recently. She said she had already been told the results of the echo, though I do not see any notes in Epic.     I told pt the echo looked normal with only mild changes, she states understanding.   Interpretation Summary  1. Normal biventricular size and function. Left ventricular ejection fraction of 55-60%. No segmental wall motion abnormalities noted.  2. The left atrium is mildly dilated.  3. No hemodynamically significant valvular disease.  4. Normal pulmonary artery pressure.  No prior study for comparison. Technically difficult study.  "

## 2019-08-06 ENCOUNTER — OFFICE VISIT (OUTPATIENT)
Dept: CARDIOLOGY | Facility: CLINIC | Age: 79
End: 2019-08-06
Attending: INTERNAL MEDICINE
Payer: COMMERCIAL

## 2019-08-06 VITALS
OXYGEN SATURATION: 99 % | BODY MASS INDEX: 43.31 KG/M2 | SYSTOLIC BLOOD PRESSURE: 138 MMHG | HEIGHT: 61 IN | HEART RATE: 51 BPM | WEIGHT: 229.4 LBS | DIASTOLIC BLOOD PRESSURE: 70 MMHG

## 2019-08-06 DIAGNOSIS — I47.10 SVT (SUPRAVENTRICULAR TACHYCARDIA) (H): ICD-10-CM

## 2019-08-06 PROCEDURE — 99214 OFFICE O/P EST MOD 30 MIN: CPT | Performed by: INTERNAL MEDICINE

## 2019-08-06 ASSESSMENT — MIFFLIN-ST. JEOR: SCORE: 1452.93

## 2019-08-06 NOTE — PROGRESS NOTES
HPI and Plan:   See dictation    Orders Placed This Encounter   Procedures     Follow-Up with Electrophysiologist     EKG 12-lead complete w/read (Future)- to be scheduled       No orders of the defined types were placed in this encounter.      There are no discontinued medications.      Encounter Diagnosis   Name Primary?     SVT (supraventricular tachycardia) (H)        CURRENT MEDICATIONS:  Current Outpatient Medications   Medication Sig Dispense Refill     ACCU-CHEK MULTICLIX LANCETS MISC Use to test blood sugar once daily or as directed.       albuterol (VENTOLIN HFA) 108 (90 Base) MCG/ACT inhaler INHALE 2 PUFFS BY MOUTH INTO THE LUNGS EVERY 4 HOURS AS NEEDED FOR SHORTNESS OF BREATH OR WHEEZING. 18 g 11     blood glucose monitoring (ACCU-CHEK NOELLE) test strip Use to test blood sugars 1 time daily or as directed. 50 each 11     blood glucose monitoring (NO BRAND SPECIFIED) meter device kit Use to test blood sugar one  time daily or as directed. 1 kit 0     blood glucose monitoring (NO BRAND SPECIFIED) test strip Use to test blood sugars one time daily or as directed 100 strip 3     buPROPion (WELLBUTRIN XL) 150 MG 24 hr tablet TAKE 1 TABLET(150 MG) BY MOUTH EVERY MORNING 90 tablet 3     Cholecalciferol (VITAMIN D) 2000 UNITS tablet Take 2,000 Units by mouth daily 100 tablet 3     citalopram (CELEXA) 20 MG tablet Take 1 tablet (20 mg) by mouth daily 90 tablet 3     Cyanocobalamin (B-12) 1000 MCG TBCR Take 1,000 mcg by mouth daily 90 tablet 3     fluticasone (FLONASE) 50 MCG/ACT nasal spray Spray 1-2 sprays into both nostrils daily 16 g 2     levothyroxine (SYNTHROID/LEVOTHROID) 88 MCG tablet Take 1 tablet (88 mcg) by mouth daily 90 tablet 3     lisinopril (PRINIVIL/ZESTRIL) 40 MG tablet TAKE 1 TABLET(40 MG) BY MOUTH DAILY 90 tablet 2     metoprolol succinate ER (TOPROL XL) 25 MG 24 hr tablet Take 1 tablet (25 mg) by mouth daily 90 tablet 1     omeprazole (PRILOSEC) 20 MG DR capsule TAKE 1 CAPSULE(20 MG) BY MOUTH  DAILY 90 capsule 2     pravastatin (PRAVACHOL) 40 MG tablet TAKE 1 TABLET BY MOUTH DAILY AT BEDTIME 90 tablet 2     ORDER FOR DME Disp one glucometer that is covered by insurance with 1 RF. Also disp glucometer strips and lancets to check sugars daily. Disp 100 strips and  Lancets  with prn RFs for 1 year 1 Device 11       ALLERGIES     Allergies   Allergen Reactions     Codeine      Tylenol #3     Metformin      Diarrhea         PAST MEDICAL HISTORY:  Past Medical History:   Diagnosis Date     Actinic keratosis 10/09    treated with liquids NO2     Anemia 5/21/2013     B12 deficiency 1/28/2018     Cancer (H) Summer 2013    basal cell cancer 2 spots removed     Carpal tunnel syndrome      Chronic renal insufficiency      CKD (chronic kidney disease) stage 3, GFR 30-59 ml/min (H)      Elevated antinuclear antibody (JACINTO) level 9/22/2017     Esophageal reflux      Essential hypertension, benign      Gout 4/29/2015     Hyperlipidemia LDL goal <100       Lung nodule 08/29/2017    left side, 8mm. Needs repeat CT chest Aug 2018     Major depression       MGUS (monoclonal gammopathy of unknown significance) 9/22/2017     Morbid obesity (H) 10/9/2015     Thrombocytopenia (H) 1/28/2018     Type 2 diabetes mellitus with diabetic chronic kidney disease  (goal A1C <7) 10/9/2015     Unspecified cataract 3/03    bilateral     Unspecified hypothyroidism      Unspecified sleep apnea      Vitamin D deficiency 11/12/2012       PAST SURGICAL HISTORY:  Past Surgical History:   Procedure Laterality Date     BONE MARROW BIOPSY, BONE SPECIMEN, NEEDLE/TROCAR  7/9/2014    Procedure: BIOPSY BONE MARROW;  Surgeon: Tony Coleman MD;  Location: [facility]     C NONSPECIFIC PROCEDURE      ventral hernia repair     C NONSPECIFIC PROCEDURE      cholecystectomy     C NONSPECIFIC PROCEDURE      umbilical hernia repair     C NONSPECIFIC PROCEDURE      right carpal tunnel syndrome repair     C NONSPECIFIC PROCEDURE  10/00    stress test (negative)      C NONSPECIFIC PROCEDURE      left cataract extraction     CHOLECYSTECTOMY         FAMILY HISTORY:  Family History   Problem Relation Age of Onset     Cancer Sister         colon cancer     Cancer Sister         left leg cancer, leg amputation     Cancer Sister         skin cancer     Gastrointestinal Disease Sister         liver disease (not cancer)     Musculoskeletal Disorder Daughter         rheumatoid arthritis     Cancer Mother          age 79, lung cancer     Cancer Father          age 81, colon cancer       SOCIAL HISTORY:  Social History     Socioeconomic History     Marital status:      Spouse name: None     Number of children: None     Years of education: None     Highest education level: None   Occupational History     None   Social Needs     Financial resource strain: None     Food insecurity:     Worry: None     Inability: None     Transportation needs:     Medical: None     Non-medical: None   Tobacco Use     Smoking status: Former Smoker     Packs/day: 1.50     Years: 25.00     Pack years: 37.50     Last attempt to quit: 10/14/1996     Years since quittin.8     Smokeless tobacco: Never Used   Substance and Sexual Activity     Alcohol use: Yes     Comment: rare     Drug use: No     Sexual activity: Not Currently   Lifestyle     Physical activity:     Days per week: None     Minutes per session: None     Stress: None   Relationships     Social connections:     Talks on phone: None     Gets together: None     Attends Shinto service: None     Active member of club or organization: None     Attends meetings of clubs or organizations: None     Relationship status: None     Intimate partner violence:     Fear of current or ex partner: None     Emotionally abused: None     Physically abused: None     Forced sexual activity: None   Other Topics Concern     Parent/sibling w/ CABG, MI or angioplasty before 65F 55M? Not Asked   Social History Narrative     None       Review of  "Systems:  Skin:  Negative       Eyes:  Positive for glasses    ENT:  Negative      Respiratory:  Positive for dyspnea on exertion;shortness of breath;dyspnea at rest getting better    Cardiovascular:    fatigue;Positive for;palpitations;chest pain lot better   Gastroenterology: Positive for reflux    Genitourinary:  Negative      Musculoskeletal:  Positive for back pain;arthritis    Neurologic:  Positive for headaches    Psychiatric:  Positive for depression;anxiety    Heme/Lymph/Imm:  Positive for   thrombocytopenia  Endocrine:  Positive for diabetes;thyroid disorder      Physical Exam:  Vitals: /70   Pulse 51   Ht 1.549 m (5' 1\")   Wt 104.1 kg (229 lb 6.4 oz)   LMP 06/16/1985 (Approximate)   SpO2 99%   BMI 43.34 kg/m      Constitutional:  cooperative, alert and oriented, well developed, well nourished, in no acute distress        Skin:  warm and dry to the touch, no apparent skin lesions or masses noted          Head:  normocephalic, no masses or lesions        Eyes:  pupils equal and round, conjunctivae and lids unremarkable, sclera white, no xanthalasma, EOMS intact, no nystagmus        Lymph:No Cervical lymphadenopathy present     ENT:  no pallor or cyanosis, dentition good        Neck:  carotid pulses are full and equal bilaterally, JVP normal, no carotid bruit        Respiratory:  normal breath sounds, clear to auscultation, normal A-P diameter, normal symmetry, normal respiratory excursion, no use of accessory muscles         Cardiac: regular rhythm, normal S1/S2, no S3 or S4, apical impulse not displaced, no murmurs, gallops or rubs                                                         GI:  abdomen soft, non-tender, BS normoactive, no mass, no HSM, no bruits        Extremities and Muscular Skeletal:  no deformities, clubbing, cyanosis, erythema observed              Neurological:  no gross motor deficits        Psych:  Alert and Oriented x 3        CC  Naa Harley MD  [id number]  " [id number]  [place] [id number]

## 2019-08-06 NOTE — LETTER
8/6/2019    Aquilino Lilly MD  [address] [facility] [id number]    RE: Mahogany DIO Blanco       Dear Colleague,    I had the pleasure of seeing Mahogany Blanco in the [facility].    HPI and Plan:   See dictation    Orders Placed This Encounter   Procedures     Follow-Up with Electrophysiologist     EKG 12-lead complete w/read (Future)- to be scheduled       No orders of the defined types were placed in this encounter.      There are no discontinued medications.      Encounter Diagnosis   Name Primary?     SVT (supraventricular tachycardia) (H)        CURRENT MEDICATIONS:  Current Outpatient Medications   Medication Sig Dispense Refill     ACCU-CHEK MULTICLIX LANCETS MISC Use to test blood sugar once daily or as directed.       albuterol (VENTOLIN HFA) 108 (90 Base) MCG/ACT inhaler INHALE 2 PUFFS BY MOUTH INTO THE LUNGS EVERY 4 HOURS AS NEEDED FOR SHORTNESS OF BREATH OR WHEEZING. 18 g 11     blood glucose monitoring (ACCU-CHEK NOELLE) test strip Use to test blood sugars 1 time daily or as directed. 50 each 11     blood glucose monitoring (NO BRAND SPECIFIED) meter device kit Use to test blood sugar one  time daily or as directed. 1 kit 0     blood glucose monitoring (NO BRAND SPECIFIED) test strip Use to test blood sugars one time daily or as directed 100 strip 3     buPROPion (WELLBUTRIN XL) 150 MG 24 hr tablet TAKE 1 TABLET(150 MG) BY MOUTH EVERY MORNING 90 tablet 3     Cholecalciferol (VITAMIN D) 2000 UNITS tablet Take 2,000 Units by mouth daily 100 tablet 3     citalopram (CELEXA) 20 MG tablet Take 1 tablet (20 mg) by mouth daily 90 tablet 3     Cyanocobalamin (B-12) 1000 MCG TBCR Take 1,000 mcg by mouth daily 90 tablet 3     fluticasone (FLONASE) 50 MCG/ACT nasal spray Spray 1-2 sprays into both nostrils daily 16 g 2     levothyroxine (SYNTHROID/LEVOTHROID) 88 MCG tablet Take 1 tablet (88 mcg) by mouth daily 90 tablet 3     lisinopril (PRINIVIL/ZESTRIL) 40 MG tablet TAKE 1  TABLET(40 MG) BY MOUTH DAILY 90 tablet 2     metoprolol succinate ER (TOPROL XL) 25 MG 24 hr tablet Take 1 tablet (25 mg) by mouth daily 90 tablet 1     omeprazole (PRILOSEC) 20 MG DR capsule TAKE 1 CAPSULE(20 MG) BY MOUTH DAILY 90 capsule 2     pravastatin (PRAVACHOL) 40 MG tablet TAKE 1 TABLET BY MOUTH DAILY AT BEDTIME 90 tablet 2     ORDER FOR DME Disp one glucometer that is covered by insurance with 1 RF. Also disp glucometer strips and lancets to check sugars daily. Disp 100 strips and  Lancets  with prn RFs for 1 year 1 Device 11       ALLERGIES     Allergies   Allergen Reactions     Codeine      Tylenol #3     Metformin      Diarrhea         PAST MEDICAL HISTORY:  Past Medical History:   Diagnosis Date     Actinic keratosis 10/09    treated with liquids NO2     Anemia 5/21/2013     B12 deficiency 1/28/2018     Cancer (H) Summer 2013    basal cell cancer 2 spots removed     Carpal tunnel syndrome      Chronic renal insufficiency      CKD (chronic kidney disease) stage 3, GFR 30-59 ml/min (H)      Elevated antinuclear antibody (JACINTO) level 9/22/2017     Esophageal reflux      Essential hypertension, benign      Gout 4/29/2015     Hyperlipidemia LDL goal <100       Lung nodule 08/29/2017    left side, 8mm. Needs repeat CT chest Aug 2018     Major depression       MGUS (monoclonal gammopathy of unknown significance) 9/22/2017     Morbid obesity (H) 10/9/2015     Thrombocytopenia (H) 1/28/2018     Type 2 diabetes mellitus with diabetic chronic kidney disease  (goal A1C <7) 10/9/2015     Unspecified cataract 3/03    bilateral     Unspecified hypothyroidism      Unspecified sleep apnea      Vitamin D deficiency 11/12/2012       PAST SURGICAL HISTORY:  Past Surgical History:   Procedure Laterality Date     BONE MARROW BIOPSY, BONE SPECIMEN, NEEDLE/TROCAR  7/9/2014    Procedure: BIOPSY BONE MARROW;  Surgeon: Tony Coleman MD;  Location:  GI     C NONSPECIFIC PROCEDURE      ventral hernia repair     C  NONSPECIFIC PROCEDURE      cholecystectomy     C NONSPECIFIC PROCEDURE      umbilical hernia repair     C NONSPECIFIC PROCEDURE      right carpal tunnel syndrome repair     C NONSPECIFIC PROCEDURE  10/00    stress test (negative)     C NONSPECIFIC PROCEDURE      left cataract extraction     CHOLECYSTECTOMY         FAMILY HISTORY:  Family History   Problem Relation Age of Onset     Cancer Sister         colon cancer     Cancer Sister         left leg cancer, leg amputation     Cancer Sister         skin cancer     Gastrointestinal Disease Sister         liver disease (not cancer)     Musculoskeletal Disorder Daughter         rheumatoid arthritis     Cancer Mother          age 79, lung cancer     Cancer Father          age 81, colon cancer       SOCIAL HISTORY:  Social History     Socioeconomic History     Marital status:      Spouse name: None     Number of children: None     Years of education: None     Highest education level: None   Occupational History     None   Social Needs     Financial resource strain: None     Food insecurity:     Worry: None     Inability: None     Transportation needs:     Medical: None     Non-medical: None   Tobacco Use     Smoking status: Former Smoker     Packs/day: 1.50     Years: 25.00     Pack years: 37.50     Last attempt to quit: 10/14/1996     Years since quittin.8     Smokeless tobacco: Never Used   Substance and Sexual Activity     Alcohol use: Yes     Comment: rare     Drug use: No     Sexual activity: Not Currently   Lifestyle     Physical activity:     Days per week: None     Minutes per session: None     Stress: None   Relationships     Social connections:     Talks on phone: None     Gets together: None     Attends Denominational service: None     Active member of club or organization: None     Attends meetings of clubs or organizations: None     Relationship status: None     Intimate partner violence:     Fear of current or ex partner: None      "Emotionally abused: None     Physically abused: None     Forced sexual activity: None   Other Topics Concern     Parent/sibling w/ CABG, MI or angioplasty before 65F 55M? Not Asked   Social History Narrative     None       Review of Systems:  Skin:  Negative       Eyes:  Positive for glasses    ENT:  Negative      Respiratory:  Positive for dyspnea on exertion;shortness of breath;dyspnea at rest getting better    Cardiovascular:    fatigue;Positive for;palpitations;chest pain lot better   Gastroenterology: Positive for reflux    Genitourinary:  Negative      Musculoskeletal:  Positive for back pain;arthritis    Neurologic:  Positive for headaches    Psychiatric:  Positive for depression;anxiety    Heme/Lymph/Imm:  Positive for   thrombocytopenia  Endocrine:  Positive for diabetes;thyroid disorder      Physical Exam:  Vitals: /70   Pulse 51   Ht 1.549 m (5' 1\")   Wt 104.1 kg (229 lb 6.4 oz)   LMP 06/16/1985 (Approximate)   SpO2 99%   BMI 43.34 kg/m       Constitutional:  cooperative, alert and oriented, well developed, well nourished, in no acute distress        Skin:  warm and dry to the touch, no apparent skin lesions or masses noted          Head:  normocephalic, no masses or lesions        Eyes:  pupils equal and round, conjunctivae and lids unremarkable, sclera white, no xanthalasma, EOMS intact, no nystagmus        Lymph:No Cervical lymphadenopathy present     ENT:  no pallor or cyanosis, dentition good        Neck:  carotid pulses are full and equal bilaterally, JVP normal, no carotid bruit        Respiratory:  normal breath sounds, clear to auscultation, normal A-P diameter, normal symmetry, normal respiratory excursion, no use of accessory muscles         Cardiac: regular rhythm, normal S1/S2, no S3 or S4, apical impulse not displaced, no murmurs, gallops or rubs                                                         GI:  abdomen soft, non-tender, BS normoactive, no mass, no HSM, no bruits    "     Extremities and Muscular Skeletal:  no deformities, clubbing, cyanosis, erythema observed              Neurological:  no gross motor deficits        Psych:  Alert and Oriented x 3        CC  Naa Harley MD  [address]  [id number]  [place] [id number]                Thank you for allowing me to participate in the care of your patient.      Sincerely,     Naa Harley MD     [facility]    cc:   Naa Harley MD  [address]  [id number]  [place] [address]

## 2019-08-06 NOTE — PROGRESS NOTES
Service Date: 2019      HISTORY OF PRESENT ILLNESS:  I saw Ms. Minaya for followup of SVT.  She is a 79-year-old white female with obesity.  She presented with shortness of breath and palpitations.  She was found to have frequent short runs of atrial tachycardia on Zio Patch monitor.  When I saw her on 2019, I prescribed low-dose metoprolol.  She has been doing better with less shortness of breath during the day.  Particularly she feels much better around the evening time for her palpitations.  Overall, she stated she feels okay.  Her echocardiography reported normal ejection fraction without other significant abnormalities.      PHYSICAL EXAMINATION:   VITAL SIGNS:  Blood pressure was 138/70, heart rate 60 beats per minute, body weight 229 pounds.   LUNGS:  Clear.   CARDIAC:  Rhythm was regular, and heart sounds were normal without murmur.   ABDOMEN:  Examination showed moderate to severe obesity.      ASSESSMENT AND RECOMMENDATIONS:  Ms. Minaya has some improvement after the last clinic visit with the addition of low-dose metoprolol.  She has no apparent structural heart disease.  Her shortness of breath with exertion may be partially contributed by obesity.  I do not have restriction for her activities.  She is encouraged to engage in some light activities and try to lose weight.  She will continue low-dose metoprolol and return for followup in 6 months.      cc:   Aquilino Lilly MD    [facility]    [address]  [address]         TELMA BECK MD             D: 2019   T: 2019   MT: RADHA      Name:     CHANTE MINAYA   MRN:      [id number]        Account:      [id number]   :      1940           Service Date: 2019      Document: [id number]

## 2019-08-06 NOTE — LETTER
2019      Aquilino Lilly MD  [address] [place] [id number]      RE: Chante Minaya       Dear Colleague,    I had the pleasure of seeing Chante Minaya in the [facility].    Service Date: 2019      HISTORY OF PRESENT ILLNESS:  I saw Ms. Minaya for followup of SVT.  She is a 79-year-old white female with obesity.  She presented with shortness of breath and palpitations.  She was found to have frequent short runs of atrial tachycardia on Zio Patch monitor.  When I saw her on 2019, I prescribed low-dose metoprolol.  She has been doing better with less shortness of breath during the day.  Particularly she feels much better around the evening time for her palpitations.  Overall, she stated she feels okay.  Her echocardiography reported normal ejection fraction without other significant abnormalities.      PHYSICAL EXAMINATION:   VITAL SIGNS:  Blood pressure was 138/70, heart rate 60 beats per minute, body weight 229 pounds.   LUNGS:  Clear.   CARDIAC:  Rhythm was regular, and heart sounds were normal without murmur.   ABDOMEN:  Examination showed moderate to severe obesity.      ASSESSMENT AND RECOMMENDATIONS:  Ms. Minaya has some improvement after the last clinic visit with the addition of low-dose metoprolol.  She has no apparent structural heart disease.  Her shortness of breath with exertion may be partially contributed by obesity.  I do not have restriction for her activities.  She is encouraged to engage in some light activities and try to lose weight.  She will continue low-dose metoprolol and return for followup in 6 months.      cc:   Aquilino Lilly MD    [facility]    [address]  [address]         TELMA BECK MD             D: 2019   T: 2019   MT: RADHA      Name:     CHANTE MINAYA   MRN:      [id number]        Account:      [id number]   :      1940           Service Date: 2019      Document:  [id number]           Outpatient Encounter Medications as of 8/6/2019   Medication Sig Dispense Refill     ACCU-CHEK MULTICLIX LANCETS MISC Use to test blood sugar once daily or as directed.       albuterol (VENTOLIN HFA) 108 (90 Base) MCG/ACT inhaler INHALE 2 PUFFS BY MOUTH INTO THE LUNGS EVERY 4 HOURS AS NEEDED FOR SHORTNESS OF BREATH OR WHEEZING. 18 g 11     blood glucose monitoring (ACCU-CHEK NOELLE) test strip Use to test blood sugars 1 time daily or as directed. 50 each 11     blood glucose monitoring (NO BRAND SPECIFIED) meter device kit Use to test blood sugar one  time daily or as directed. 1 kit 0     blood glucose monitoring (NO BRAND SPECIFIED) test strip Use to test blood sugars one time daily or as directed 100 strip 3     buPROPion (WELLBUTRIN XL) 150 MG 24 hr tablet TAKE 1 TABLET(150 MG) BY MOUTH EVERY MORNING 90 tablet 3     Cholecalciferol (VITAMIN D) 2000 UNITS tablet Take 2,000 Units by mouth daily 100 tablet 3     citalopram (CELEXA) 20 MG tablet Take 1 tablet (20 mg) by mouth daily 90 tablet 3     Cyanocobalamin (B-12) 1000 MCG TBCR Take 1,000 mcg by mouth daily 90 tablet 3     fluticasone (FLONASE) 50 MCG/ACT nasal spray Spray 1-2 sprays into both nostrils daily 16 g 2     lisinopril (PRINIVIL/ZESTRIL) 40 MG tablet TAKE 1 TABLET(40 MG) BY MOUTH DAILY 90 tablet 2     metoprolol succinate ER (TOPROL XL) 25 MG 24 hr tablet Take 1 tablet (25 mg) by mouth daily 90 tablet 1     pravastatin (PRAVACHOL) 40 MG tablet TAKE 1 TABLET BY MOUTH DAILY AT BEDTIME 90 tablet 2     [DISCONTINUED] levothyroxine (SYNTHROID/LEVOTHROID) 88 MCG tablet Take 1 tablet (88 mcg) by mouth daily 90 tablet 3     [DISCONTINUED] omeprazole (PRILOSEC) 20 MG DR capsule TAKE 1 CAPSULE(20 MG) BY MOUTH DAILY 90 capsule 2     ORDER FOR DME Disp one glucometer that is covered by insurance with 1 RF. Also disp glucometer strips and lancets to check sugars daily. Disp 100 strips and  Lancets  with prn RFs for 1 year 1 Device 11     No  facility-administered encounter medications on file as of 8/6/2019.        Again, thank you for allowing me to participate in the care of your patient.      Sincerely,    Naa Harley MD     [facility]

## 2019-08-07 DIAGNOSIS — K21.9 GASTROESOPHAGEAL REFLUX DISEASE, ESOPHAGITIS PRESENCE NOT SPECIFIED: ICD-10-CM

## 2019-08-07 DIAGNOSIS — E03.9 HYPOTHYROIDISM, UNSPECIFIED TYPE: ICD-10-CM

## 2019-08-07 RX ORDER — LEVOTHYROXINE SODIUM 88 UG/1
88 TABLET ORAL DAILY
Qty: 90 TABLET | Refills: 2 | Status: SHIPPED | OUTPATIENT
Start: 2019-08-07 | End: 2020-06-15 | Stop reason: DRUGHIGH

## 2019-08-07 NOTE — TELEPHONE ENCOUNTER
"Requested Prescriptions   Pending Prescriptions Disp Refills     levothyroxine (SYNTHROID/LEVOTHROID) 88 MCG tablet 90 tablet 3     Sig: Take 1 tablet (88 mcg) by mouth daily       Thyroid Protocol Passed - 8/7/2019  8:00 AM        Passed - Patient is 12 years or older        Passed - Recent (12 mo) or future (30 days) visit within the authorizing provider's specialty     Patient had office visit in the last 12 months or has a visit in the next 30 days with authorizing provider or within the authorizing provider's specialty.  See \"Patient Info\" tab in inbasket, or \"Choose Columns\" in Meds & Orders section of the refill encounter.              Passed - Medication is active on med list        Passed - Normal TSH on file in past 12 months     Recent Labs   Lab Test 03/14/19  1601   TSH 3.00              Passed - No active pregnancy on record     If patient is pregnant or has had a positive pregnancy test, please check TSH.          Passed - No positive pregnancy test in past 12 months     If patient is pregnant or has had a positive pregnancy test, please check TSH.          omeprazole (PRILOSEC) 20 MG DR capsule 90 capsule 2     Sig: Take 1 capsule (20 mg) by mouth daily       PPI Protocol Passed - 8/7/2019  8:00 AM        Passed - Not on Clopidogrel (unless Pantoprazole ordered)        Passed - No diagnosis of osteoporosis on record        Passed - Recent (12 mo) or future (30 days) visit within the authorizing provider's specialty     Patient had office visit in the last 12 months or has a visit in the next 30 days with authorizing provider or within the authorizing provider's specialty.  See \"Patient Info\" tab in inbasket, or \"Choose Columns\" in Meds & Orders section of the refill encounter.              Passed - Medication is active on med list        Passed - Patient is age 18 or older        Passed - No active pregnacy on record        Passed - No positive pregnancy test in past 12 months          "

## 2019-08-16 ENCOUNTER — TELEPHONE (OUTPATIENT)
Dept: INTERNAL MEDICINE | Facility: CLINIC | Age: 79
End: 2019-08-16

## 2019-08-16 NOTE — TELEPHONE ENCOUNTER
called pt  Last light fainting spell over one month ago . Pt wanting to follow up PCP is not in clinic next week . Pt states she is fine till Monday  Daughter lives across the street . No new sx . aArdiology did not find a reason so pt think she needs to start over.Annelise Garza RN

## 2019-09-22 DIAGNOSIS — D51.9 ANEMIA DUE TO VITAMIN B12 DEFICIENCY, UNSPECIFIED B12 DEFICIENCY TYPE: ICD-10-CM

## 2019-09-22 DIAGNOSIS — R06.02 SOB (SHORTNESS OF BREATH): ICD-10-CM

## 2019-09-23 NOTE — TELEPHONE ENCOUNTER
"Requested Prescriptions   Pending Prescriptions Disp Refills     albuterol (PROAIR HFA/PROVENTIL HFA/VENTOLIN HFA) 108 (90 Base) MCG/ACT inhaler [Pharmacy Med Name: ALBUTEROL HFA INH (200 PUFFS) 18GM] 18 g 0     Sig: INHALE 2 PUFFS BY MOUTH INTO THE LUNGS EVERY 4 HOURS AS NEEDED FOR SHORTNESS OF BREATH OR WHEEZING.   Last Written Prescription Date:  8/24/2018  Last Fill Quantity: 18g,  # refills: 11   Last Office Visit: 4/29/2019   Future Office Visit:         Asthma Maintenance Inhalers - Anticholinergics Passed - 9/22/2019  4:20 PM        Passed - Patient is age 12 years or older        Passed - Recent (12 mo) or future (30 days) visit within the authorizing provider's specialty     Patient had office visit in the last 12 months or has a visit in the next 30 days with authorizing provider or within the authorizing provider's specialty.  See \"Patient Info\" tab in [facility], or \"Choose Columns\" in Meds & Orders section of the refill encounter.              Passed - Medication is active on med list        Cyanocobalamin (VITAMIN B-12 CR) 1000 MCG TBCR [Pharmacy Med Name: VITAMIN B-12 1000MCG TR TABLETS W/F] 90 tablet 0     Sig: TAKE 1 TABLET BY MOUTH DAILY   Last Written Prescription Date:  8/24/2018  Last Fill Quantity: 90,  # refills: 3   Last Office Visit: 4/29/2019   Future Office Visit:         Vitamin Supplements (Adult) Protocol Passed - 9/22/2019  4:20 PM        Passed - High dose Vitamin D not ordered        Passed - Recent (12 mo) or future (30 days) visit within the authorizing provider's specialty     Patient had office visit in the last 12 months or has a visit in the next 30 days with authorizing provider or within the authorizing provider's specialty.  See \"Patient Info\" tab in inbasket, or \"Choose Columns\" in Meds & Orders section of the refill encounter.              Passed - Medication is active on med list          "

## 2019-09-25 RX ORDER — ALBUTEROL SULFATE 90 UG/1
AEROSOL, METERED RESPIRATORY (INHALATION)
Qty: 18 G | Refills: 5 | Status: SHIPPED | OUTPATIENT
Start: 2019-09-25 | End: 2020-07-31

## 2019-09-25 RX ORDER — CYANOCOBALAMIN (VITAMIN B-12) 1000 MCG
TABLET, EXTENDED RELEASE ORAL
Qty: 90 TABLET | Refills: 1 | Status: SHIPPED | OUTPATIENT
Start: 2019-09-25 | End: 2020-03-17

## 2019-12-26 DIAGNOSIS — I47.10 SVT (SUPRAVENTRICULAR TACHYCARDIA) (H): ICD-10-CM

## 2019-12-26 RX ORDER — METOPROLOL SUCCINATE 25 MG/1
25 TABLET, EXTENDED RELEASE ORAL DAILY
Qty: 90 TABLET | Refills: 0 | Status: SHIPPED | OUTPATIENT
Start: 2019-12-26 | End: 2020-03-23

## 2020-01-06 ENCOUNTER — OFFICE VISIT (OUTPATIENT)
Dept: DERMATOLOGY | Facility: CLINIC | Age: 80
End: 2020-01-06
Payer: COMMERCIAL

## 2020-01-06 VITALS — SYSTOLIC BLOOD PRESSURE: 131 MMHG | DIASTOLIC BLOOD PRESSURE: 71 MMHG

## 2020-01-06 DIAGNOSIS — L82.0 INFLAMED SEBORRHEIC KERATOSIS: ICD-10-CM

## 2020-01-06 DIAGNOSIS — L82.1 SEBORRHEIC KERATOSES: ICD-10-CM

## 2020-01-06 DIAGNOSIS — L81.4 LENTIGINES: ICD-10-CM

## 2020-01-06 DIAGNOSIS — D48.5 NEOPLASM OF UNCERTAIN BEHAVIOR OF SKIN: Primary | ICD-10-CM

## 2020-01-06 PROCEDURE — 11102 TANGNTL BX SKIN SINGLE LES: CPT | Mod: 59 | Performed by: PHYSICIAN ASSISTANT

## 2020-01-06 PROCEDURE — 99203 OFFICE O/P NEW LOW 30 MIN: CPT | Mod: 25 | Performed by: PHYSICIAN ASSISTANT

## 2020-01-06 PROCEDURE — 88305 TISSUE EXAM BY PATHOLOGIST: CPT | Mod: TC | Performed by: PHYSICIAN ASSISTANT

## 2020-01-06 PROCEDURE — 17110 DESTRUCTION B9 LES UP TO 14: CPT | Performed by: PHYSICIAN ASSISTANT

## 2020-01-06 NOTE — PATIENT INSTRUCTIONS
Wound Care Instructions For Right Hand    FOR SUPERFICIAL WOUNDS     [facility] [phone]    [facility] [phone]          AFTER 24 HOURS YOU SHOULD REMOVE THE BANDAGE AND BEGIN DAILY DRESSING CHANGES AS FOLLOWS:     1) Remove Dressing.     2) Clean and dry the area with tap water using a Q-tip or sterile gauze pad.     3) Apply Vaseline, Aquaphor, Polysporin ointment or Bacitracin ointment over entire wound.  Do NOT use Neosporin ointment.     4) Cover the wound with a band-aid, or a sterile non-stick gauze pad and micropore paper tape      REPEAT THESE INSTRUCTIONS AT LEAST ONCE A DAY UNTIL THE WOUND HAS COMPLETELY HEALED.    It is an old wives tale that a wound heals better when it is exposed to air and allowed to dry out. The wound will heal faster with a better cosmetic result if it is kept moist with ointment and covered with a bandage.    **Do not let the wound dry out.**      Supplies Needed:      *Cotton tipped applicators (Q-tips)    *Polysporin Ointment or Bacitracin Ointment (NOT NEOSPORIN)    *Band-aids or non-stick gauze pads and micropore paper tape.      PATIENT INFORMATION:    During the healing process you will notice a number of changes. All wounds develop a small halo of redness surrounding the wound.  This means healing is occurring. Severe itching with extensive redness usually indicates sensitivity to the ointment or bandage tape used to dress the wound.  You should call our office if this develops.      Swelling  and/or discoloration around your surgical site is common, particularly when performed around the eye.    All wounds normally drain.  The larger the wound the more drainage there will be.  After 7-10 days, you will notice the wound beginning to shrink and new skin will begin to grow.  The wound is healed when you can see skin has formed over the entire area.  A healed wound has a healthy, shiny look to the surface and is red to dark pink  in color to normalize.  Wounds may take approximately 4-6 weeks to heal.  Larger wounds may take 6-8 weeks.  After the wound is healed you may discontinue dressing changes.    You may experience a sensation of tightness as your wound heals. This is normal and will gradually subside.    Your healed wound may be sensitive to temperature changes. This sensitivity improves with time, but if you re having a lot of discomfort, try to avoid temperature extremes.    Patients frequently experience itching after their wound appears to have healed because of the continue healing under the skin.  Plain Vaseline will help relieve the itching.        POSSIBLE COMPLICATIONS    BLEEDIN. Leave the bandage in place.  2. Use tightly rolled up gauze or a cloth to apply direct pressure over the bandage for 30  minutes.  3. Reapply pressure for an additional 30 minutes if necessary  4. Use additional gauze and tape to maintain pressure once the bleeding has stopped.    WOUND CARE INSTRUCTIONS  FOR CRYOSURGERY        This area treated with liquid nitrogen will form a blister. You do not need to bandage the area until after the blister forms and breaks (which may be a few days).  When the blister breaks, begin daily dressing changes as follows:    1) Clean and dry the area with tap water using clean Q-tip or sterile gauze pad.    2) Apply Polysporin ointment or Bacitracin ointment over entire wound.  Do NOT use Neosporin ointment.    3) Cover the wound with a band-aid or sterile non-stick gauze pad and micropore paper tape.      REPEAT THESE INSTRUCTIONS AT LEAST ONCE A DAY UNTIL THE WOUND HAS COMPLETELY HEALED.        It is an old wives tale that a wound heals better when it is exposed to air and allowed to dry out. The wound will heal faster with a better cosmetic result if it is kept moist with ointment and covered with a bandage.  Do not let the wound dry out.      Supplies Needed:     *Cotton tipped applicators  (Q-tips)   *Polysporin ointment or Bacitracin ointment (NOT NEOSPORIN)   *Band-aids, or non stick gauze pads and micropore paper tape    PATIENT INFORMATION    During the healing process you will notice a number of changes. All wounds develop a small halo of redness surrounding the wound.  This means healing is occurring. Severe itching with extensive redness usually indicates sensitivity to the ointment or bandage tape used to dress the wound.  You should call our office if this develops.      Swelling and/or discoloration around your surgical site is common, particularly when performed around the eye.    All wounds normally drain.  The larger the wound the more drainage there will be.  After 7-10 days, you will notice the wound beginning to shrink and new skin will begin to grow.  The wound is healed when you can see skin has formed over the entire area.  A healed wound has a healthy, shiny look to the surface and is red to dark pink in color to normalize.  Wounds may take approximately 4-6 weeks to heal.  Larger wounds may take 6-8 weeks.  After the wound is healed you may discontinue dressing changes.    You may experience a sensation of tightness as your wound heals. This is normal and will gradually subside.    Your healed wound may be sensitive to temperature changes. This sensitivity improves with time, but if you re having a lot of discomfort, try to avoid temperature extremes.    Patients frequently experience itching after their wound appears to have healed because of the continue healing under the skin.  Plain Vaseline will help relieve the itching.               Proper skin care from [facility]:    -Eliminate harsh soaps as they strip the natural oils from the skin, often resulting in dry itchy skin ( i.e. Dial, Zest, Elizabeth Spring)  -Use mild soaps such as Cetaphil or Dove Sensitive Skin in the shower. You do not need to use soap on arms, legs, and trunk every time you shower unless visibly  soiled.   -Avoid hot or cold showers.  -After showering, lightly dry off and apply moisturizing within 2-3 minutes. This will help trap moisture in the skin.   -Aggressive use of a moisturizer at least 1-2 times a day to the entire body (including -Vanicream, Cetaphil, Aquaphor or Cerave) and moisturize hands after every washing.  -We recommend using moisturizers that come in a tub that needs to be scooped out, not a pump. This has more of an oil base. It will hold moisture in your skin much better than a water base moisturizer. The above recommended are non-pore clogging.      Wear a sunscreen with at least SPF 30 on your face, ears, neck and V of the chest daily. Wear sunscreen on other areas of the body if those areas are exposed to the sun throughout the day. Sunscreens can contain physical and/or chemical blockers. Physical blockers are less likely to clog pores, these include zinc oxide and titanium dioxide. Reapply every two hour and after swimming. Sunscreen examples include Neutrogena, CeraVe, Blue Lizard, Elta MD and many others.    UV radiation  UVA radiation remains constant throughout the day and throughout the year. It is a longer wavelength than UVB and therefore penetrates deeper into the skin leading to immediate and delayed tanning, photoaging, and skin cancer. 70-80% of UVA and UVB radiation occurs between the hours of 10am-2pm.  UVB radiation  UVB radiation causes the most harmful effects and is more significant during the summer months. However, snow and ice can reflect UVB radiation leading to skin damage during the winter months as well. UVB radiation is responsible for tanning, burning, inflammation, delayed erythema (pinkness), pigmentation (brown spots), and skin cancer.

## 2020-01-06 NOTE — LETTER
1/6/2020         RE: Mahogany Blanco  [address] [facility] [id number]        Dear Colleague,    Thank you for referring your patient, Mahogany Blanco, to the [facility]. Please see a copy of my visit note below.    HPI:  Mahogany Blanco is a 79 year old female patient here today for spots on hands and forearms .  Patient states this has been present for a while.  Patient reports the following symptoms: scaly  .  Patient reports the following previous treatments: none.  Patient reports the following modifying factors: none.  Associated symptoms: none.  Patient has no other skin complaints today.  Remainder of the HPI, Meds, PMH, Allergies, FH, and SH was reviewed in chart.    Pertinent Hx:   No personal or family history of skin cancer    Past Medical History:   Diagnosis Date     Actinic keratosis 10/09    treated with liquids NO2     Anemia 5/21/2013     B12 deficiency 1/28/2018     Cancer (H) Summer 2013    basal cell cancer 2 spots removed     Carpal tunnel syndrome      Chronic renal insufficiency      CKD (chronic kidney disease) stage 3, GFR 30-59 ml/min (H)      Elevated antinuclear antibody (JACINTO) level 9/22/2017     Esophageal reflux      Essential hypertension, benign      Gout 4/29/2015     Hyperlipidemia LDL goal <100       Lung nodule 08/29/2017    left side, 8mm. Needs repeat CT chest Aug 2018     Major depression       MGUS (monoclonal gammopathy of unknown significance) 9/22/2017     Morbid obesity (H) 10/9/2015     Thrombocytopenia (H) 1/28/2018     Type 2 diabetes mellitus with diabetic chronic kidney disease  (goal A1C <7) 10/9/2015     Unspecified cataract 3/03    bilateral     Unspecified hypothyroidism      Unspecified sleep apnea      Vitamin D deficiency 11/12/2012       Past Surgical History:   Procedure Laterality Date     BONE MARROW BIOPSY, BONE SPECIMEN, NEEDLE/TROCAR  7/9/2014    Procedure: BIOPSY BONE MARROW;  Surgeon: Tony Coleman,  MD;  Location:  GI     C NONSPECIFIC PROCEDURE      ventral hernia repair     C NONSPECIFIC PROCEDURE      cholecystectomy     C NONSPECIFIC PROCEDURE      umbilical hernia repair     C NONSPECIFIC PROCEDURE      right carpal tunnel syndrome repair     C NONSPECIFIC PROCEDURE  10/00    stress test (negative)     C NONSPECIFIC PROCEDURE      left cataract extraction     CHOLECYSTECTOMY          Family History   Problem Relation Age of Onset     Cancer Sister         colon cancer     Cancer Sister         left leg cancer, leg amputation     Cancer Sister         skin cancer     Gastrointestinal Disease Sister         liver disease (not cancer)     Musculoskeletal Disorder Daughter         rheumatoid arthritis     Cancer Mother          age 79, lung cancer     Cancer Father          age 81, colon cancer       Social History     Socioeconomic History     Marital status:      Spouse name: Not on file     Number of children: Not on file     Years of education: Not on file     Highest education level: Not on file   Occupational History     Not on file   Social Needs     Financial resource strain: Not on file     Food insecurity:     Worry: Not on file     Inability: Not on file     Transportation needs:     Medical: Not on file     Non-medical: Not on file   Tobacco Use     Smoking status: Former Smoker     Packs/day: 1.50     Years: 25.00     Pack years: 37.50     Last attempt to quit: 10/14/1996     Years since quittin.2     Smokeless tobacco: Never Used   Substance and Sexual Activity     Alcohol use: Yes     Comment: rare     Drug use: No     Sexual activity: Not Currently   Lifestyle     Physical activity:     Days per week: Not on file     Minutes per session: Not on file     Stress: Not on file   Relationships     Social connections:     Talks on phone: Not on file     Gets together: Not on file     Attends Anabaptism service: Not on file     Active member of club or organization:  Not on file     Attends meetings of clubs or organizations: Not on file     Relationship status: Not on file     Intimate partner violence:     Fear of current or ex partner: Not on file     Emotionally abused: Not on file     Physically abused: Not on file     Forced sexual activity: Not on file   Other Topics Concern     Parent/sibling w/ CABG, MI or angioplasty before 65F 55M? Not Asked   Social History Narrative     Not on file       Outpatient Encounter Medications as of 1/6/2020   Medication Sig Dispense Refill     ACCU-CHEK MULTICLIX LANCETS MISC Use to test blood sugar once daily or as directed.       albuterol (PROAIR HFA/PROVENTIL HFA/VENTOLIN HFA) 108 (90 Base) MCG/ACT inhaler INHALE 2 PUFFS BY MOUTH INTO THE LUNGS EVERY 4 HOURS AS NEEDED FOR SHORTNESS OF BREATH OR WHEEZING. 18 g 5     albuterol (VENTOLIN HFA) 108 (90 Base) MCG/ACT inhaler INHALE 2 PUFFS BY MOUTH INTO THE LUNGS EVERY 4 HOURS AS NEEDED FOR SHORTNESS OF BREATH OR WHEEZING. 18 g 11     blood glucose monitoring (ACCU-CHEK NOELLE) test strip Use to test blood sugars 1 time daily or as directed. 50 each 11     blood glucose monitoring (NO BRAND SPECIFIED) meter device kit Use to test blood sugar one  time daily or as directed. 1 kit 0     blood glucose monitoring (NO BRAND SPECIFIED) test strip Use to test blood sugars one time daily or as directed 100 strip 3     buPROPion (WELLBUTRIN XL) 150 MG 24 hr tablet TAKE 1 TABLET(150 MG) BY MOUTH EVERY MORNING 90 tablet 3     Cholecalciferol (VITAMIN D) 2000 UNITS tablet Take 2,000 Units by mouth daily 100 tablet 3     citalopram (CELEXA) 20 MG tablet Take 1 tablet (20 mg) by mouth daily 90 tablet 3     Cyanocobalamin (VITAMIN B-12 CR) 1000 MCG TBCR TAKE 1 TABLET BY MOUTH DAILY 90 tablet 1     fluticasone (FLONASE) 50 MCG/ACT nasal spray Spray 1-2 sprays into both nostrils daily 16 g 2     levothyroxine (SYNTHROID/LEVOTHROID) 88 MCG tablet Take 1 tablet (88 mcg) by mouth daily 90 tablet 2      lisinopril (PRINIVIL/ZESTRIL) 40 MG tablet TAKE 1 TABLET(40 MG) BY MOUTH DAILY 90 tablet 2     metoprolol succinate ER (TOPROL XL) 25 MG 24 hr tablet Take 1 tablet (25 mg) by mouth daily 90 tablet 0     omeprazole (PRILOSEC) 20 MG DR capsule Take 1 capsule (20 mg) by mouth daily 90 capsule 2     ORDER FOR DME Disp one glucometer that is covered by insurance with 1 RF. Also disp glucometer strips and lancets to check sugars daily. Disp 100 strips and  Lancets  with prn RFs for 1 year 1 Device 11     pravastatin (PRAVACHOL) 40 MG tablet TAKE 1 TABLET BY MOUTH DAILY AT BEDTIME 90 tablet 2     No facility-administered encounter medications on file as of 1/6/2020.        Review Of Systems:  Skin: spots on hands and forearms  Eyes: negative  Ears/Nose/Throat: negative  Respiratory: No shortness of breath, dyspnea on exertion, cough, or hemoptysis  Cardiovascular: negative  Gastrointestinal: negative  Genitourinary: negative  Musculoskeletal: negative  Neurologic: negative  Psychiatric: negative  Hematologic/Lymphatic/Immunologic: negative  Endocrine: negative      Objective:     BP (!) 143/80   LMP 06/16/1985 (Approximate)   Eyes: Conjunctivae/lids: Normal   ENT: Lips:  Normal  MSK: Normal  Cardiovascular: Peripheral edema none  Pulm: Breathing Normal  Neuro/Psych: Orientation: A/O x 3 Normal; Mood/Affect: Normal, NAD, WDWN  Pt accompanied by: grandchild  Following areas examined: face, neck, forearms  Emanuel skin type:ii   Findings:  Inflamed brown, stuck-on scaly appearing papules on forearms and dorsal forearms  Pink smooth papule with white crust on right 2nd MCP 0.5cm  Yañez WD smooth macules on forearms and dorsal hands  Brown, stuck-on scaly appearing papules on forearms and dorsal hands  Assessment and Plan:  1) ISK x 6  Benign etiology and course of lesion.  LN2: Treated with LN2 for 5s for 1-2 cycles. Warned risks of blistering, pain, pigment change, scarring, and incomplete resolution.  Advised patient to  return if lesions do not completely resolve within 2-3 months.  Wound care sheet given.    2) Neoplasm of uncertain behavior on right 2nd MCP 0.5cm  ISK vs SCC  TANGENTIAL BIOPSY:  After consent, anesthesia with LEC and prep, tangential biopsy performed.  No complications and routine wound care.  May grow back and will get a scar. Based on lesion type may need to completely remove lesion. Patient will be notified in 7-10 days of results. Wound care directions given.    3) Lentigines and seborrheic keratoses    Treatment of these lesions would be purely cosmetic and not medically neccessary.  Lesion may recur and/or may not completely resolve. May need additional treatment.  Different removal options including excision, cryotherapy, cautery and /or laser.        Signs and Symptoms of non-melanoma skin cancer and ABCDEs. patient was asked about new or changing moles/lesions on body.   Wear a sunscreen with at least SPF 30 on your face, ears, neck and V of the chest daily. Wear sunscreen on other areas of the body if those areas are exposed to the sun throughout the day. Sunscreens can contain physical and/or chemical blockers. Physical blockers are less likely to clog pores, these include zinc oxide and titanium dioxide. Reapply every two hour and after swimming. Sunscreen examples include Neutrogena, CeraVe, Blue Lizard, Elta MD and many others.        Follow up in yearly FBE      Again, thank you for allowing me to participate in the care of your patient.        Sincerely,        Ludy Tesfaye PA-C

## 2020-01-06 NOTE — PROGRESS NOTES
HPI:  Mahogany Blanco is a 79 year old female patient here today for spots on hands and forearms .  Patient states this has been present for a while.  Patient reports the following symptoms: scaly  .  Patient reports the following previous treatments: none.  Patient reports the following modifying factors: none.  Associated symptoms: none.  Patient has no other skin complaints today.  Remainder of the HPI, Meds, PMH, Allergies, FH, and SH was reviewed in chart.    Pertinent Hx:   No personal or family history of skin cancer    Past Medical History:   Diagnosis Date     Actinic keratosis 10/09    treated with liquids NO2     Anemia 5/21/2013     B12 deficiency 1/28/2018     Cancer (H) Summer 2013    basal cell cancer 2 spots removed     Carpal tunnel syndrome      Chronic renal insufficiency      CKD (chronic kidney disease) stage 3, GFR 30-59 ml/min (H)      Elevated antinuclear antibody (JACINTO) level 9/22/2017     Esophageal reflux      Essential hypertension, benign      Gout 4/29/2015     Hyperlipidemia LDL goal <100       Lung nodule 08/29/2017    left side, 8mm. Needs repeat CT chest Aug 2018     Major depression       MGUS (monoclonal gammopathy of unknown significance) 9/22/2017     Morbid obesity (H) 10/9/2015     Thrombocytopenia (H) 1/28/2018     Type 2 diabetes mellitus with diabetic chronic kidney disease  (goal A1C <7) 10/9/2015     Unspecified cataract 3/03    bilateral     Unspecified hypothyroidism      Unspecified sleep apnea      Vitamin D deficiency 11/12/2012       Past Surgical History:   Procedure Laterality Date     BONE MARROW BIOPSY, BONE SPECIMEN, NEEDLE/TROCAR  7/9/2014    Procedure: BIOPSY BONE MARROW;  Surgeon: Tony Coleman MD;  Location:  GI     C NONSPECIFIC PROCEDURE      ventral hernia repair     C NONSPECIFIC PROCEDURE      cholecystectomy     C NONSPECIFIC PROCEDURE      umbilical hernia repair     C NONSPECIFIC PROCEDURE      right carpal tunnel syndrome repair     C  NONSPECIFIC PROCEDURE  10/00    stress test (negative)     C NONSPECIFIC PROCEDURE      left cataract extraction     CHOLECYSTECTOMY          Family History   Problem Relation Age of Onset     Cancer Sister         colon cancer     Cancer Sister         left leg cancer, leg amputation     Cancer Sister         skin cancer     Gastrointestinal Disease Sister         liver disease (not cancer)     Musculoskeletal Disorder Daughter         rheumatoid arthritis     Cancer Mother          age 79, lung cancer     Cancer Father          age 81, colon cancer       Social History     Socioeconomic History     Marital status:      Spouse name: Not on file     Number of children: Not on file     Years of education: Not on file     Highest education level: Not on file   Occupational History     Not on file   Social Needs     Financial resource strain: Not on file     Food insecurity:     Worry: Not on file     Inability: Not on file     Transportation needs:     Medical: Not on file     Non-medical: Not on file   Tobacco Use     Smoking status: Former Smoker     Packs/day: 1.50     Years: 25.00     Pack years: 37.50     Last attempt to quit: 10/14/1996     Years since quittin.2     Smokeless tobacco: Never Used   Substance and Sexual Activity     Alcohol use: Yes     Comment: rare     Drug use: No     Sexual activity: Not Currently   Lifestyle     Physical activity:     Days per week: Not on file     Minutes per session: Not on file     Stress: Not on file   Relationships     Social connections:     Talks on phone: Not on file     Gets together: Not on file     Attends Anabaptist service: Not on file     Active member of club or organization: Not on file     Attends meetings of clubs or organizations: Not on file     Relationship status: Not on file     Intimate partner violence:     Fear of current or ex partner: Not on file     Emotionally abused: Not on file     Physically abused: Not on file      Forced sexual activity: Not on file   Other Topics Concern     Parent/sibling w/ CABG, MI or angioplasty before 65F 55M? Not Asked   Social History Narrative     Not on file       Outpatient Encounter Medications as of 1/6/2020   Medication Sig Dispense Refill     ACCU-CHEK MULTICLIX LANCETS MISC Use to test blood sugar once daily or as directed.       albuterol (PROAIR HFA/PROVENTIL HFA/VENTOLIN HFA) 108 (90 Base) MCG/ACT inhaler INHALE 2 PUFFS BY MOUTH INTO THE LUNGS EVERY 4 HOURS AS NEEDED FOR SHORTNESS OF BREATH OR WHEEZING. 18 g 5     albuterol (VENTOLIN HFA) 108 (90 Base) MCG/ACT inhaler INHALE 2 PUFFS BY MOUTH INTO THE LUNGS EVERY 4 HOURS AS NEEDED FOR SHORTNESS OF BREATH OR WHEEZING. 18 g 11     blood glucose monitoring (ACCU-CHEK NOELLE) test strip Use to test blood sugars 1 time daily or as directed. 50 each 11     blood glucose monitoring (NO BRAND SPECIFIED) meter device kit Use to test blood sugar one  time daily or as directed. 1 kit 0     blood glucose monitoring (NO BRAND SPECIFIED) test strip Use to test blood sugars one time daily or as directed 100 strip 3     buPROPion (WELLBUTRIN XL) 150 MG 24 hr tablet TAKE 1 TABLET(150 MG) BY MOUTH EVERY MORNING 90 tablet 3     Cholecalciferol (VITAMIN D) 2000 UNITS tablet Take 2,000 Units by mouth daily 100 tablet 3     citalopram (CELEXA) 20 MG tablet Take 1 tablet (20 mg) by mouth daily 90 tablet 3     Cyanocobalamin (VITAMIN B-12 CR) 1000 MCG TBCR TAKE 1 TABLET BY MOUTH DAILY 90 tablet 1     fluticasone (FLONASE) 50 MCG/ACT nasal spray Spray 1-2 sprays into both nostrils daily 16 g 2     levothyroxine (SYNTHROID/LEVOTHROID) 88 MCG tablet Take 1 tablet (88 mcg) by mouth daily 90 tablet 2     lisinopril (PRINIVIL/ZESTRIL) 40 MG tablet TAKE 1 TABLET(40 MG) BY MOUTH DAILY 90 tablet 2     metoprolol succinate ER (TOPROL XL) 25 MG 24 hr tablet Take 1 tablet (25 mg) by mouth daily 90 tablet 0     omeprazole (PRILOSEC) 20 MG DR capsule Take 1 capsule (20 mg) by  mouth daily 90 capsule 2     ORDER FOR DME Disp one glucometer that is covered by insurance with 1 RF. Also disp glucometer strips and lancets to check sugars daily. Disp 100 strips and  Lancets  with prn RFs for 1 year 1 Device 11     pravastatin (PRAVACHOL) 40 MG tablet TAKE 1 TABLET BY MOUTH DAILY AT BEDTIME 90 tablet 2     No facility-administered encounter medications on file as of 1/6/2020.        Review Of Systems:  Skin: spots on hands and forearms  Eyes: negative  Ears/Nose/Throat: negative  Respiratory: No shortness of breath, dyspnea on exertion, cough, or hemoptysis  Cardiovascular: negative  Gastrointestinal: negative  Genitourinary: negative  Musculoskeletal: negative  Neurologic: negative  Psychiatric: negative  Hematologic/Lymphatic/Immunologic: negative  Endocrine: negative      Objective:     BP (!) 143/80   LMP 06/16/1985 (Approximate)   Eyes: Conjunctivae/lids: Normal   ENT: Lips:  Normal  MSK: Normal  Cardiovascular: Peripheral edema none  Pulm: Breathing Normal  Neuro/Psych: Orientation: A/O x 3 Normal; Mood/Affect: Normal, NAD, WDWN  Pt accompanied by: grandchild  Following areas examined: face, neck, forearms  Emanuel skin type:ii   Findings:  Inflamed brown, stuck-on scaly appearing papules on forearms and dorsal forearms  Pink smooth papule with white crust on right 2nd MCP 0.5cm  Yañez WD smooth macules on forearms and dorsal hands  Brown, stuck-on scaly appearing papules on forearms and dorsal hands  Assessment and Plan:  1) ISK x 6  Benign etiology and course of lesion.  LN2: Treated with LN2 for 5s for 1-2 cycles. Warned risks of blistering, pain, pigment change, scarring, and incomplete resolution.  Advised patient to return if lesions do not completely resolve within 2-3 months.  Wound care sheet given.    2) Neoplasm of uncertain behavior on right 2nd MCP 0.5cm  ISK vs SCC  TANGENTIAL BIOPSY:  After consent, anesthesia with LEC and prep, tangential biopsy performed.  No  complications and routine wound care.  May grow back and will get a scar. Based on lesion type may need to completely remove lesion. Patient will be notified in 7-10 days of results. Wound care directions given.    3) Lentigines and seborrheic keratoses    Treatment of these lesions would be purely cosmetic and not medically neccessary.  Lesion may recur and/or may not completely resolve. May need additional treatment.  Different removal options including excision, cryotherapy, cautery and /or laser.        Signs and Symptoms of non-melanoma skin cancer and ABCDEs. patient was asked about new or changing moles/lesions on body.   Wear a sunscreen with at least SPF 30 on your face, ears, neck and V of the chest daily. Wear sunscreen on other areas of the body if those areas are exposed to the sun throughout the day. Sunscreens can contain physical and/or chemical blockers. Physical blockers are less likely to clog pores, these include zinc oxide and titanium dioxide. Reapply every two hour and after swimming. Sunscreen examples include Neutrogena, CeraVe, Blue Lizard, Elta MD and many others.        Follow up in yearly FBE

## 2020-01-09 ENCOUNTER — TELEPHONE (OUTPATIENT)
Dept: DERMATOLOGY | Facility: CLINIC | Age: 80
End: 2020-01-09

## 2020-01-09 LAB — COPATH REPORT: NORMAL

## 2020-01-09 NOTE — TELEPHONE ENCOUNTER
----- Message from Ludy Tesfaye PA-C sent at 1/9/2020 11:55 AM CST -----  Skin, right 2nd MCP:   - Hypertrophic actinic keratosis with overlying cutaneous horn -precancer  Recommend follow up to treat with cryo

## 2020-01-10 ENCOUNTER — OFFICE VISIT (OUTPATIENT)
Dept: INTERNAL MEDICINE | Facility: CLINIC | Age: 80
End: 2020-01-10
Payer: COMMERCIAL

## 2020-01-10 VITALS
TEMPERATURE: 98.4 F | OXYGEN SATURATION: 97 % | RESPIRATION RATE: 16 BRPM | HEIGHT: 61 IN | BODY MASS INDEX: 40.59 KG/M2 | HEART RATE: 85 BPM | WEIGHT: 215 LBS | SYSTOLIC BLOOD PRESSURE: 124 MMHG | DIASTOLIC BLOOD PRESSURE: 72 MMHG

## 2020-01-10 DIAGNOSIS — F32.1 MAJOR DEPRESSIVE DISORDER, SINGLE EPISODE, MODERATE (H): ICD-10-CM

## 2020-01-10 DIAGNOSIS — N18.30 TYPE 2 DIABETES MELLITUS WITH STAGE 3 CHRONIC KIDNEY DISEASE, WITHOUT LONG-TERM CURRENT USE OF INSULIN (H): ICD-10-CM

## 2020-01-10 DIAGNOSIS — F41.9 ANXIETY: ICD-10-CM

## 2020-01-10 DIAGNOSIS — D47.2 MGUS (MONOCLONAL GAMMOPATHY OF UNKNOWN SIGNIFICANCE): ICD-10-CM

## 2020-01-10 DIAGNOSIS — N18.30 CKD (CHRONIC KIDNEY DISEASE) STAGE 3, GFR 30-59 ML/MIN (H): ICD-10-CM

## 2020-01-10 DIAGNOSIS — E78.5 HYPERLIPIDEMIA LDL GOAL <100: ICD-10-CM

## 2020-01-10 DIAGNOSIS — D64.9 ANEMIA, UNSPECIFIED TYPE: ICD-10-CM

## 2020-01-10 DIAGNOSIS — D80.8 LIGHT CHAIN DISEASE (H): ICD-10-CM

## 2020-01-10 DIAGNOSIS — E11.22 TYPE 2 DIABETES MELLITUS WITH STAGE 3 CHRONIC KIDNEY DISEASE, WITHOUT LONG-TERM CURRENT USE OF INSULIN (H): ICD-10-CM

## 2020-01-10 DIAGNOSIS — E66.01 MORBID OBESITY, UNSPECIFIED OBESITY TYPE (H): ICD-10-CM

## 2020-01-10 DIAGNOSIS — E03.9 HYPOTHYROIDISM, UNSPECIFIED TYPE: ICD-10-CM

## 2020-01-10 LAB
ALBUMIN SERPL-MCNC: 3.6 G/DL (ref 3.4–5)
ALP SERPL-CCNC: 41 U/L (ref 40–150)
ALT SERPL W P-5'-P-CCNC: 17 U/L (ref 0–50)
ANION GAP SERPL CALCULATED.3IONS-SCNC: 6 MMOL/L (ref 3–14)
AST SERPL W P-5'-P-CCNC: 17 U/L (ref 0–45)
BILIRUB SERPL-MCNC: 0.5 MG/DL (ref 0.2–1.3)
BUN SERPL-MCNC: 16 MG/DL (ref 7–30)
CALCIUM SERPL-MCNC: 8.9 MG/DL (ref 8.5–10.1)
CHLORIDE SERPL-SCNC: 106 MMOL/L (ref 94–109)
CHOLEST SERPL-MCNC: 143 MG/DL
CO2 SERPL-SCNC: 25 MMOL/L (ref 20–32)
CREAT SERPL-MCNC: 1.46 MG/DL (ref 0.52–1.04)
ERYTHROCYTE [DISTWIDTH] IN BLOOD BY AUTOMATED COUNT: 13.9 % (ref 10–15)
GFR SERPL CREATININE-BSD FRML MDRD: 34 ML/MIN/{1.73_M2}
GLUCOSE SERPL-MCNC: 102 MG/DL (ref 70–99)
HBA1C MFR BLD: 5.5 % (ref 0–5.6)
HCT VFR BLD AUTO: 32.7 % (ref 35–47)
HDLC SERPL-MCNC: 56 MG/DL
HGB BLD-MCNC: 10.6 G/DL (ref 11.7–15.7)
LDLC SERPL CALC-MCNC: 64 MG/DL
MCH RBC QN AUTO: 35 PG (ref 26.5–33)
MCHC RBC AUTO-ENTMCNC: 32.4 G/DL (ref 31.5–36.5)
MCV RBC AUTO: 108 FL (ref 78–100)
NONHDLC SERPL-MCNC: 87 MG/DL
PLATELET # BLD AUTO: 159 10E9/L (ref 150–450)
POTASSIUM SERPL-SCNC: 4.4 MMOL/L (ref 3.4–5.3)
PROT SERPL-MCNC: 7.6 G/DL (ref 6.8–8.8)
RBC # BLD AUTO: 3.03 10E12/L (ref 3.8–5.2)
SODIUM SERPL-SCNC: 137 MMOL/L (ref 133–144)
T4 FREE SERPL-MCNC: 1.04 NG/DL (ref 0.76–1.46)
TRIGL SERPL-MCNC: 115 MG/DL
TSH SERPL DL<=0.005 MIU/L-ACNC: 14.54 MU/L (ref 0.4–4)
WBC # BLD AUTO: 13.6 10E9/L (ref 4–11)

## 2020-01-10 PROCEDURE — 84443 ASSAY THYROID STIM HORMONE: CPT | Performed by: INTERNAL MEDICINE

## 2020-01-10 PROCEDURE — 83883 ASSAY NEPHELOMETRY NOT SPEC: CPT | Performed by: INTERNAL MEDICINE

## 2020-01-10 PROCEDURE — 84165 PROTEIN E-PHORESIS SERUM: CPT | Performed by: INTERNAL MEDICINE

## 2020-01-10 PROCEDURE — 80061 LIPID PANEL: CPT | Performed by: INTERNAL MEDICINE

## 2020-01-10 PROCEDURE — 84439 ASSAY OF FREE THYROXINE: CPT | Performed by: INTERNAL MEDICINE

## 2020-01-10 PROCEDURE — 85027 COMPLETE CBC AUTOMATED: CPT | Performed by: INTERNAL MEDICINE

## 2020-01-10 PROCEDURE — 36415 COLL VENOUS BLD VENIPUNCTURE: CPT | Performed by: INTERNAL MEDICINE

## 2020-01-10 PROCEDURE — 82784 ASSAY IGA/IGD/IGG/IGM EACH: CPT | Performed by: INTERNAL MEDICINE

## 2020-01-10 PROCEDURE — 99214 OFFICE O/P EST MOD 30 MIN: CPT | Performed by: INTERNAL MEDICINE

## 2020-01-10 PROCEDURE — 82232 ASSAY OF BETA-2 PROTEIN: CPT | Performed by: INTERNAL MEDICINE

## 2020-01-10 PROCEDURE — 80053 COMPREHEN METABOLIC PANEL: CPT | Performed by: INTERNAL MEDICINE

## 2020-01-10 PROCEDURE — 83036 HEMOGLOBIN GLYCOSYLATED A1C: CPT | Performed by: INTERNAL MEDICINE

## 2020-01-10 ASSESSMENT — ANXIETY QUESTIONNAIRES
IF YOU CHECKED OFF ANY PROBLEMS ON THIS QUESTIONNAIRE, HOW DIFFICULT HAVE THESE PROBLEMS MADE IT FOR YOU TO DO YOUR WORK, TAKE CARE OF THINGS AT HOME, OR GET ALONG WITH OTHER PEOPLE: SOMEWHAT DIFFICULT
5. BEING SO RESTLESS THAT IT IS HARD TO SIT STILL: NOT AT ALL
2. NOT BEING ABLE TO STOP OR CONTROL WORRYING: NEARLY EVERY DAY
GAD7 TOTAL SCORE: 15
6. BECOMING EASILY ANNOYED OR IRRITABLE: NEARLY EVERY DAY
1. FEELING NERVOUS, ANXIOUS, OR ON EDGE: NEARLY EVERY DAY
3. WORRYING TOO MUCH ABOUT DIFFERENT THINGS: NEARLY EVERY DAY
7. FEELING AFRAID AS IF SOMETHING AWFUL MIGHT HAPPEN: NEARLY EVERY DAY

## 2020-01-10 ASSESSMENT — PATIENT HEALTH QUESTIONNAIRE - PHQ9
SUM OF ALL RESPONSES TO PHQ QUESTIONS 1-9: 5
5. POOR APPETITE OR OVEREATING: NOT AT ALL

## 2020-01-10 ASSESSMENT — MIFFLIN-ST. JEOR: SCORE: 1387.61

## 2020-01-10 NOTE — PATIENT INSTRUCTIONS
Stop Buproprion   Citalopram 20mg tab, 1.5 tabs (30mg) daily for mental health   Counting of breathing when stressed. ? Anxiety blanket   Prayer, volunteer opportunities, other things that  involve helping others rather than simply worrying about others   Labs as ordered  Continue other meds for now  Continue improved diet for further weight loss. Activity as able  Call update to nurseline in 2-3 weeks

## 2020-01-10 NOTE — TELEPHONE ENCOUNTER
Called and LM for patient to call back in regards to biopsy results.    Jerson RN-BSN-PHN  [facility]  [phone]

## 2020-01-10 NOTE — PROGRESS NOTES
Subjective     Mahogany Blanco is a 79 year old female who presents to clinic today for the following health issues:    HPI   Chief Complaint   Patient presents with     Abdominal Pain     Possible IBS     Medication Question     Question re:HTN     PHQ-9 SCORE 6/19/2018 3/16/2019 1/10/2020   PHQ-9 Total Score - - -   PHQ-9 Total Score 3 7 5     KISHA-7 SCORE 3/16/2019 1/10/2020   Total Score 4 15     ABDOMINAL   PAIN     Onset: off and on for several months    Description:   Character: Dull ache  Location: epigastric region russell-umbilical region  Radiation: None    Intensity: moderate    Progression of Symptoms:  same and intermittent    Accompanying Signs & Symptoms:  Fever/Chills?: no   Gas/Bloating: no   Nausea: YES  Vomitting: No  Diarrhea?: YES  Constipation:no   Dysuria or Hematuria: no    History:   Trauma: no   Previous similar pain: no    Previous tests done: none    Precipitating factors:   Does the pain change with:     Food: no      BM: YES    Urination: no     Alleviating factors:  NONE    Therapies Tried and outcome: NONE    LMP:  not applicable        Pt's past medical history, family history, habits, medications and allergies were reviewed with the patient today.  See snap shot for  HCM status. Most recent lab results reviewed with pt. Problem list and histories reviewed & adjusted, as indicated.  Additional history as below:    Patient seen with her daughter today.  Denies chest pain, shortness of breath or current abdominal pain.  History of anxiety and depression patient tends to worry.  A lot about her children and their health but also worries about things in general such as the fires in [place], the homeless and other things that she cannot control.  When she gets anxious, not in her stools will loosen and will have some nausea.  Patient had her next-door neighbor Chante come over last night and brought her siblings to.  Patient found this very comforting that up neighbor cared for her enough  to do this and patient ate 2 bowls of this to without any abdominal symptoms.  After eating dinner, patient went into her living room and states that she found a feeling of centerpiece which she had not had for quite a while and was happy and smiling.  Patient is  and still struggles sometimes with asking why her  had passed away before she did.  Patient enjoys sewing.  Sometimes likes singing late at night to help her sleep.  Those songs are ones that she would sing to her children when they were infants and bring her comfort   Hx DM. Checks sugars in AM only and < 140 always.   Improved diet recently   Hx MGUS and light chain disease with anemia. Due for f/u labs. Denies seeing blood in stools  Sleeping OK overall. Mild fatigue  Weight down 50 pounds in 1 year with both better diet and with GI sx intermittently reducing appetite  Denies recent palpitations with Metoprolol. Hx SVT. Previous cardiology notes reviewed,     KISHA-7   [employer], 2002; Used with Permission) 3/16/2019   1. Feeling nervous, anxious, or on edge 0   2. Not being able to stop or control worrying 1   3. Worrying too much about different things 2   4. Trouble relaxing 0   5. Being so restless that it is hard to sit still 0   6. Becoming easily annoyed or irritable 0   7. Feeling afraid, as if something awful might happen 1   KISHA-7 Total Score 4   If you checked any problems, how difficult have they made it for you to do your work, take care of things at home, or get along with other people? Somewhat difficult     KISHA-7   [employer], 2002; Used with Permission) 1/10/2020   1. Feeling nervous, anxious, or on edge 3   2. Not being able to stop or control worrying 3   3. Worrying too much about different things 3   4. Trouble relaxing 0   5. Being so restless that it is hard to sit still 0   6. Becoming easily annoyed or irritable 3   7. Feeling afraid, as if something awful might happen 3   KISHA-7 Total Score 15   If you checked any  "problems, how difficult have they made it for you to do your work, take care of things at home, or get along with other people? Somewhat difficult        PHQ-9 ([employer]) 3/16/2019   1.  Little interest or pleasure in doing things 0   2.  Feeling down, depressed, or hopeless 1   3.  Trouble falling or staying asleep, or sleeping too much 0   4.  Feeling tired or having little energy 3   5.  Poor appetite or overeating 0   6.  Feeling bad about yourself 3   7.  Trouble concentrating 0   8.  Moving slowly or restless 0   9.  Suicidal or self-harm thoughts 0   PHQ-9 Total Score 7   Difficulty at work, home, or with people Somewhat difficult     PHQ-9 ([employer]) 1/10/2020   1.  Little interest or pleasure in doing things 0   2.  Feeling down, depressed, or hopeless 3   3.  Trouble falling or staying asleep, or sleeping too much 0   4.  Feeling tired or having little energy 1   5.  Poor appetite or overeating 1   6.  Feeling bad about yourself 0   7.  Trouble concentrating 0   8.  Moving slowly or restless 0   9.  Suicidal or self-harm thoughts 0   PHQ-9 Total Score 5   Difficulty at work, home, or with people Not difficult at all       Additional ROS:   Constitutional, HEENT, Cardiovascular, Pulmonary, GI and , Neuro, MSK and Psych review of systems/symptoms are otherwise negative or unchanged from previous, except as noted above.      OBJECTIVE:  /72   Pulse 85   Temp 98.4  F (36.9  C) (Temporal)   Resp 16   Ht 1.549 m (5' 1\")   Wt 97.5 kg (215 lb)   LMP 1985 (Approximate)   SpO2 97%   BMI 40.62 kg/m     Estimated body mass index is 40.62 kg/m  as calculated from the following:    Height as of this encounter: 1.549 m (5' 1\").    Weight as of this encounter: 97.5 kg (215 lb).   Gen: Occ tearful talking about stresses and her  . At end of appt, appearing much calmer and happier and appreciative of MD time talking  Neck: no adenopathy. Thyroid normal to palpation. No bruits  Pulm: Lungs " clear to auscultation   CV: Regular rates and rhythm  GI: Soft, obese, nontender all 4 quads currently, Normal active bowel sounds, No hepatosplenomegaly or masses palpable  Ext: Peripheral pulses intact. No edema.  Neuro: Normal strength and tone, sensory exam grossly normal    Assessment/Plan: (See plan discussion below for further details)  1. Type 2 diabetes mellitus with stage 3 chronic kidney disease, without long-term current use of insulin (H)  Previously controlled.  Continue diet therapy for now.  Labs as ordered  - Hemoglobin A1c  - Comprehensive metabolic panel  - Lipid panel reflex to direct LDL Fasting  - TSH with free T4 reflex  - Albumin Random Urine Quantitative with Creat Ratio; Future     2. Morbid obesity, unspecified obesity type (H)  Improved with better diet. Wt loss 50 pounds in 1 year. Will continue    3. Light chain disease (H)  Lab as ordered. If worsened, will refer to Hematology  - Kappa and lambda light chain    4. CKD (chronic kidney disease) stage 3, GFR 30-59 ml/min (H)   Has been stable previously. Lab for follow-up  - Comprehensive metabolic panel    5. Anxiety   Needs improved control. Increase Citalopram. See plan discussion below for other counseling. Pt declines need for formal therapist referral. Intermittent abd sx likely related hugo IBS. NO sx last night after neighbor visiting and ate well. Denies abd pain today  - citalopram (CELEXA) 20 MG tablet; Take 1.5 tablets (30 mg) by mouth daily  Dispense: 145 tablet; Refill: 3     6. Hypothyroidism, unspecified type   On Levothyroxine. Due for lab f/u, especially with weight loss  - TSH with free T4 reflex  - T4 free     7. Anemia, unspecified type   Due for lab f/u. Previously stable. Denies seeing blood in stools  - CBC with platelets    8. MGUS (monoclonal gammopathy of unknown significance)  Previously stable. Due for lab f/u  - CBC with platelets  - Protein electrophoresis  - Immunoglobulins A G and M  - Beta 2  microglobulin    9. Hyperlipidemia LDL goal <100  Due for lab f/u. Continue statin therapy for now  - Lipid panel reflex to direct LDL Fasting  - pravastatin (PRAVACHOL) 40 MG tablet; TAKE 1 TABLET BY MOUTH DAILY AT BEDTIME  Dispense: 90 tablet; Refill: 3      Plan discussion:  Stop Buproprion   Citalopram 20mg tab, 1.5 tabs (30mg) daily for mental health   Counting of breathing when stressed. ? Anxiety blanket   Prayer, volunteer opportunities, other things that  involve helping others rather than simply worrying about others   Labs as ordered  Continue other meds for now  Continue improved diet for further weight loss. Activity as able  Call update to nurseline in 2-3 weeks          Aquilino Lilly MD  Internal Medicine Department  [facility]    (Chart documentation was completed, in part, with [employer] voice-recognition software. Even though reviewed, some grammatical, spelling, and word errors may remain.)

## 2020-01-11 RX ORDER — CITALOPRAM HYDROBROMIDE 20 MG/1
30 TABLET ORAL DAILY
Qty: 145 TABLET | Refills: 3 | Status: SHIPPED | OUTPATIENT
Start: 2020-01-11 | End: 2021-01-01

## 2020-01-11 RX ORDER — PRAVASTATIN SODIUM 40 MG
TABLET ORAL
Qty: 90 TABLET | Refills: 3 | Status: SHIPPED | OUTPATIENT
Start: 2020-01-11 | End: 2020-11-30

## 2020-01-11 ASSESSMENT — ANXIETY QUESTIONNAIRES: GAD7 TOTAL SCORE: 15

## 2020-01-13 LAB
ALBUMIN SERPL ELPH-MCNC: 3.9 G/DL (ref 3.7–5.1)
ALPHA1 GLOB SERPL ELPH-MCNC: 0.3 G/DL (ref 0.2–0.4)
ALPHA2 GLOB SERPL ELPH-MCNC: 0.8 G/DL (ref 0.5–0.9)
B-GLOBULIN SERPL ELPH-MCNC: 0.9 G/DL (ref 0.6–1)
B2 MICROGLOB SERPL-MCNC: 5 MG/L
GAMMA GLOB SERPL ELPH-MCNC: 1.5 G/DL (ref 0.7–1.6)
IGA SERPL-MCNC: 339 MG/DL (ref 84–499)
IGG SERPL-MCNC: 1420 MG/DL (ref 610–1616)
IGM SERPL-MCNC: 255 MG/DL (ref 35–242)
KAPPA LC UR-MCNC: 7.33 MG/DL (ref 0.33–1.94)
KAPPA LC/LAMBDA SER: 1.39 {RATIO} (ref 0.26–1.65)
LAMBDA LC SERPL-MCNC: 5.27 MG/DL (ref 0.57–2.63)
M PROTEIN SERPL ELPH-MCNC: 0.2 G/DL
PROT PATTERN SERPL ELPH-IMP: ABNORMAL

## 2020-01-13 NOTE — TELEPHONE ENCOUNTER
Called and spoke to patient.    Educated patient on biopsy results- hypertrophic actinic keratosis with overlying cutaneous horn.    Educated patient on hypertrophic actinic keratosis with overlying cutaneous horn, cryo, and scheduled future cryo appointment.    Patient voiced understanding.    BENITEZ Arthur-BSN-N  [facility]  [phone]

## 2020-01-23 ENCOUNTER — TELEPHONE (OUTPATIENT)
Dept: INTERNAL MEDICINE | Facility: CLINIC | Age: 80
End: 2020-01-23

## 2020-02-07 NOTE — TELEPHONE ENCOUNTER
Dr. Lilly-please advise    Patient calling stating that she has attempted on several occassions to receive her lab results that were completed on 01/10/2020 with no success. Lab results are finalized in chart but are pending provider review. Please advise regarding patient's lab results. She would like a call back today and it is okay to leave a message. Thank you!    Ludy Peterson RN on 2/7/2020 at 10:53 AM

## 2020-02-11 NOTE — TELEPHONE ENCOUNTER
Spoke with Mahogany earlier 2/10 PM and discussed results. Informed her that will be speaking with her  In more detail 2/11/20 PM after clinic ends to make Levothyroxine dose increased and discus possible hematology consult future re: light chain disease

## 2020-02-14 NOTE — TELEPHONE ENCOUNTER
Pt called back to get her medication for levothyroxine dose increased pt said no one has called pt back. Please call pt back to let her know if she is going to get a new medication or not. Thanks.

## 2020-02-22 ENCOUNTER — APPOINTMENT (OUTPATIENT)
Dept: GENERAL RADIOLOGY | Facility: CLINIC | Age: 80
End: 2020-02-22
Attending: EMERGENCY MEDICINE
Payer: COMMERCIAL

## 2020-02-22 ENCOUNTER — APPOINTMENT (OUTPATIENT)
Dept: CT IMAGING | Facility: CLINIC | Age: 80
End: 2020-02-22
Attending: EMERGENCY MEDICINE
Payer: COMMERCIAL

## 2020-02-22 ENCOUNTER — HOSPITAL ENCOUNTER (EMERGENCY)
Facility: CLINIC | Age: 80
Discharge: HOME | End: 2020-02-22
Attending: EMERGENCY MEDICINE | Admitting: EMERGENCY MEDICINE
Payer: COMMERCIAL

## 2020-02-22 VITALS
HEIGHT: 64 IN | BODY MASS INDEX: 36.7 KG/M2 | HEART RATE: 50 BPM | SYSTOLIC BLOOD PRESSURE: 108 MMHG | DIASTOLIC BLOOD PRESSURE: 48 MMHG | OXYGEN SATURATION: 94 % | RESPIRATION RATE: 14 BRPM | TEMPERATURE: 98.1 F | WEIGHT: 215 LBS

## 2020-02-22 DIAGNOSIS — S09.90XA CLOSED HEAD INJURY, INITIAL ENCOUNTER: ICD-10-CM

## 2020-02-22 DIAGNOSIS — S00.03XA CONTUSION OF FACE, SCALP AND NECK, INITIAL ENCOUNTER: ICD-10-CM

## 2020-02-22 DIAGNOSIS — S10.93XA CONTUSION OF FACE, SCALP AND NECK, INITIAL ENCOUNTER: ICD-10-CM

## 2020-02-22 DIAGNOSIS — S80.01XA CONTUSION OF RIGHT KNEE, INITIAL ENCOUNTER: ICD-10-CM

## 2020-02-22 DIAGNOSIS — S00.83XA CONTUSION OF FACE, SCALP AND NECK, INITIAL ENCOUNTER: ICD-10-CM

## 2020-02-22 PROCEDURE — 72125 CT NECK SPINE W/O DYE: CPT

## 2020-02-22 PROCEDURE — 70450 CT HEAD/BRAIN W/O DYE: CPT

## 2020-02-22 PROCEDURE — 73562 X-RAY EXAM OF KNEE 3: CPT | Mod: RT

## 2020-02-22 PROCEDURE — 99285 EMERGENCY DEPT VISIT HI MDM: CPT | Mod: 25

## 2020-02-22 PROCEDURE — 25000132 ZZH RX MED GY IP 250 OP 250 PS 637: Performed by: EMERGENCY MEDICINE

## 2020-02-22 RX ORDER — ACETAMINOPHEN 500 MG
1000 TABLET ORAL ONCE
Status: COMPLETED | OUTPATIENT
Start: 2020-02-22 | End: 2020-02-22

## 2020-02-22 RX ADMIN — ACETAMINOPHEN 1000 MG: 500 TABLET, FILM COATED ORAL at 10:46

## 2020-02-22 ASSESSMENT — ENCOUNTER SYMPTOMS
WOUND: 1
NUMBNESS: 0

## 2020-02-22 ASSESSMENT — MIFFLIN-ST. JEOR: SCORE: 1435.23

## 2020-02-22 NOTE — ED AVS SNAPSHOT
Emergency Department  [address] [facility] [address]  Phone:  [phone]  Fax:  [phone]                                    Mahogany Blanco   MRN: [id number]    Department:   Emergency Department   Date of Visit:  2/22/2020           After Visit Summary Signature Page    I have received my discharge instructions, and my questions have been answered. I have discussed any challenges I see with this plan with the nurse or doctor.    ..........................................................................................................................................  Patient/Patient Representative Signature      ..........................................................................................................................................  Patient Representative Print Name and Relationship to Patient    ..................................................               ................................................  Date                                   Time    ..........................................................................................................................................  Reviewed by Signature/Title    ...................................................              ..............................................  Date                                               Time          22EPIC Rev 08/18

## 2020-02-22 NOTE — ED TRIAGE NOTES
Pt tripped and fell today at home, lac left eyebrow, family states pt seems confused which is not normal

## 2020-02-22 NOTE — ED NOTES
Bed: ED15  Expected date:   Expected time:   Means of arrival:   Comments:  gloria - 516 - 79 F fall head injury eta 0942

## 2020-02-22 NOTE — ED PROVIDER NOTES
History     Chief Complaint:  Fall      HPI   Mahogany Blanco is a 79 year old female who presents to the emergency department for evaluation following a mechanical fall. The patient reports today in a C collar. The patient tripped over her foot stool and fell today at home when she was going to let her dog out at 0800. After this she states that the next thing she knows she was on the ground by her fireplace. She has life alert, but did not have it on so she called for her granddaughter who lives with her to push the button. The patient's daughters report that she is acting confused which is not normal. She is back to her baseline now. The patient has a wound on her left eyebrow, knee pain, and nose pain. The patient denies hip pain or numbness.    Allergies:  Codeine  Metformin    Medications:    Proair  Venolin  Celexa  Flonase  Levothyroxine  Lisinopril  Toprol  Prilosec  Pravachol    Past Medical History:    Carpal tunnel syndrome  Sleep apnea  Hypertension  Cataract  Hyperlipidemia  CKD  Vitamin D deficiency  Anxiety  Anemia  JENNIFER  Gout  Morbid obesity  Depression  Hypothyroidism  GERD  Type 2 diabetes mellitus  Lung nodule  MGUS  Elevated antinuclear antibody level  Thrombocytopenia  B12 deficiency  Light chain disease  Actinic keratosis  Cancer  Chronic renal insufficiency  Esophageal reflux    Past Surgical History:    Bone marrow biopsy, bone specimen, needle/trocar  Ventral hernia repair  Cholecystectomy  Umbilical hernia repair  Right carpal tunnel syndrome repair  Stress test (negative)  Left cataract extraction    Family History:    Colon cancer  Left leg cancer, leg amputation  Skin cancer  Liver disease  Rheumatoid arthritis  Lung cancer    Social History:  The patient presents today with her two daughters.  Former smoker  Positive for alcohol use.   Negative for drug use.  Marital Status:      Review of Systems   HENT:        Nose pain   Musculoskeletal:        Knee pain  Denies hip pain  "  Skin: Positive for wound.   Neurological: Negative for numbness.   All other systems reviewed and are negative.      Physical Exam     Patient Vitals for the past 24 hrs:   BP Temp Temp src Resp SpO2 Height Weight   02/22/20 0929 128/83 -- -- -- -- -- --   02/22/20 0925 96/59 98.1  F (36.7  C) Oral 14 (!) 88 % 1.626 m (5' 4\") 97.5 kg (215 lb)     Physical Exam  Eye:  Pupils are equal, round, and reactive.  Extraocular movements intact.    ENT:  No rhinorrhea.  Moist mucus membranes.  Normal tongue and tonsil.    Cardiac:  Regular rate and rhythm.  No murmurs, gallops, or rubs.    Pulmonary:  Clear to auscultation bilaterally.  No wheezes, rales, or rhonchi.    Abdomen:  Positive bowel sounds.  Abdomen is soft and non-distended, without focal tenderness.    Musculoskeletal:  Normal movement of all extremities without evidence for deficit. No midline tenderness to the neck or back. Generalized pain about the right knee, worse with flexion with small over lying abrasion    Skin:  Warm and dry without rashes.  There is a 1 cm vertical abrasion medial to the left eyebrow with an underlying hematoma.  There is no active bleeding or suturable lesion.    Neurologic:  Non-focal exam without asymmetric weakness or numbness.     Psychiatric:  Normal affect with appropriate interaction with examiner.    Emergency Department Course     Imaging:  Radiology findings were communicated with the patient who voiced understanding of the findings.    Head CT w/o contrast:  1. Negative for acute intracranial process.  2. Mild atrophy and chronic vascular changes.  3. Midline frontal scalp swelling without fracture.  As per radiology.    Cervical spine CT w/o contrast:  No evidence of acute fracture or subluxation in the  cervical spine. Cervical spondylosis described level by level above. No significant interval change.   As per radiology.    XR Knee Right 3 Views:  No fractures are evident. Trace knee joint effusion is  new. Advanced " narrowing of the medial compartment with moderate hypertrophic change. Moderate hypertrophic change in the lateral patellofemoral compartments. Normal patellar alignment.  Chondrocalcinosis. Osteopenia.   As per radiology.    Interventions:  1046 Tylenol 1000 mg PO    Emergency Department Course:    0925 Nursing notes and vitals reviewed.    1011 I performed an exam of the patient as documented above.     1005 The patient was sent for a Head CT w/o Contrast while in the emergency department, results above.     1005 The patient was sent for a Cervical spine CT w/o contrast while in the emergency department, results above.     1035 The patient was sent for a XR Knee Right 3 Views while in the emergency department, results above.     1043 Patient rechecked and updated.     1111 Patient rechecked and updated.     1128 Patient rechecked and updated.     1204 Patient rechecked and updated.     Findings and plan explained to the Patient. Patient discharged home with instructions regarding supportive care, medications, and reasons to return. The importance of close follow-up was reviewed.     Impression & Plan     Medical Decision Making:  Mahogany Blanco is a 79 year old female who presents to the emergency department today with concerns of having a fall today.  This was a mechanical fall where she tripped over an ottoman.  She fell forward and struck her nose and face on the ground.  On exam, there is swelling to the center of the forehead with a small abrasion along with some swelling to the lip and chin.  She also struck her knee with a small abrasion here but is ambulatory.  With her age and closed head injury, CTs of the head and neck were obtained which were unremarkable.  X-ray of the knee is negative.  Her face was cleaned up and she does not require any sutures.  I explained that she will be rather sore the neck several days.  Her daughters are here with her and they are comfortable taking her home and watching  her closely.  She will use Motrin and Tylenol for pain.  She will return to us for any worsening of condition or other emergent concerns.    Discharge Diagnosis:    ICD-10-CM    1. Closed head injury, initial encounter S09.90XA    2. Contusion of face, scalp and neck, initial encounter S00.83XA     S00.03XA     S10.93XA    3. Contusion of right knee, initial encounter S80.01XA      Disposition:  The patient is discharged to home.     Scribe Disclosure:  I, German Archer, am serving as a scribe at 9:35 AM on 2/22/2020 to document services personally performed by Trierweiler, Chad A, MD based on my observations and the provider's statements to me.        Trierweiler, Chad A, MD  02/22/20 3012

## 2020-03-10 ENCOUNTER — TELEPHONE (OUTPATIENT)
Dept: INTERNAL MEDICINE | Facility: CLINIC | Age: 80
End: 2020-03-10

## 2020-03-10 NOTE — TELEPHONE ENCOUNTER
Informed patient that Dr. Lilly sent over Celexa 20mg electronically to pharmacy on 1/11/2020 with 3 refills.     HUE Virgen

## 2020-03-10 NOTE — TELEPHONE ENCOUNTER
Reason for Call:  Patient request    Detailed comments: Patient would like to be put back on the medication Citalopram 20mg.    Phone Number Patient can be reached at: Home number on file [phone] (home)    Best Time: Anytime    Can we leave a detailed message on this number? YES    Call taken on 3/10/2020 at 2:16 PM by Joaquim Hodge

## 2020-03-16 DIAGNOSIS — D51.9 ANEMIA DUE TO VITAMIN B12 DEFICIENCY, UNSPECIFIED B12 DEFICIENCY TYPE: ICD-10-CM

## 2020-03-17 RX ORDER — UBIDECARENONE 75 MG
CAPSULE ORAL
Qty: 90 TABLET | Refills: 3 | Status: SHIPPED | OUTPATIENT
Start: 2020-03-17

## 2020-03-23 DIAGNOSIS — I47.10 SVT (SUPRAVENTRICULAR TACHYCARDIA) (H): ICD-10-CM

## 2020-03-23 RX ORDER — METOPROLOL SUCCINATE 25 MG/1
25 TABLET, EXTENDED RELEASE ORAL DAILY
Qty: 90 TABLET | Refills: 0 | Status: SHIPPED | OUTPATIENT
Start: 2020-03-23 | End: 2020-06-30

## 2020-04-01 DIAGNOSIS — D80.8 LIGHT CHAIN DISEASE (H): ICD-10-CM

## 2020-04-01 DIAGNOSIS — E03.9 HYPOTHYROIDISM, UNSPECIFIED TYPE: ICD-10-CM

## 2020-04-01 DIAGNOSIS — D47.2 MGUS (MONOCLONAL GAMMOPATHY OF UNKNOWN SIGNIFICANCE): ICD-10-CM

## 2020-04-01 DIAGNOSIS — N18.30 CKD (CHRONIC KIDNEY DISEASE) STAGE 3, GFR 30-59 ML/MIN (H): Primary | ICD-10-CM

## 2020-04-01 NOTE — TELEPHONE ENCOUNTER
"Requested Prescriptions   Pending Prescriptions Disp Refills     levothyroxine (SYNTHROID/LEVOTHROID) 88 MCG tablet [Pharmacy Med Name: LEVOTHYROXINE 0.088MG (88MCG) TAB] 90 tablet 2     Sig: TAKE 1 TABLET(88 MCG) BY MOUTH DAILY       Thyroid Protocol Failed - 4/1/2020  7:59 AM        Failed - Normal TSH on file in past 12 months     Recent Labs   Lab Test 01/10/20  1529   TSH 14.54*              Passed - Patient is 12 years or older        Passed - Recent (12 mo) or future (30 days) visit within the authorizing provider's specialty     Patient has had an office visit with the authorizing provider or a provider within the authorizing providers department within the previous 12 mos or has a future within next 30 days. See \"Patient Info\" tab in inbasket, or \"Choose Columns\" in Meds & Orders section of the refill encounter.              Passed - Medication is active on med list        Passed - No active pregnancy on record     If patient is pregnant or has had a positive pregnancy test, please check TSH.          Passed - No positive pregnancy test in past 12 months     If patient is pregnant or has had a positive pregnancy test, please check TSH.               "

## 2020-04-14 DIAGNOSIS — F32.1 MAJOR DEPRESSIVE DISORDER, SINGLE EPISODE, MODERATE (H): ICD-10-CM

## 2020-04-14 RX ORDER — BUPROPION HYDROCHLORIDE 150 MG/1
TABLET ORAL
Qty: 90 TABLET | Refills: 3 | OUTPATIENT
Start: 2020-04-14

## 2020-04-14 NOTE — TELEPHONE ENCOUNTER
"Requested Prescriptions   Pending Prescriptions Disp Refills     buPROPion (WELLBUTRIN XL) 150 MG 24 hr tablet [Pharmacy Med Name: BUPROPION XL 150MG TABLETS (24 H)] 90 tablet 3     Sig: TAKE 1 TABLET(150 MG) BY MOUTH EVERY MORNING       SSRIs Protocol Failed - 4/14/2020  5:07 AM        Failed - PHQ-9 score less than 5 in past 6 months     Please review last PHQ-9 score.           Failed - Medication is active on med list        Passed - Medication is Bupropion     If the medication is Bupropion (Wellbutrin), and the patient is taking for smoking cessation; OK to refill.          Passed - Patient is age 18 or older        Passed - No active pregnancy on record        Passed - No positive pregnancy test in last 12 months        Passed - Recent (6 mo) or future (30 days) visit within the authorizing provider's specialty     Patient had office visit in the last 6 months or has a visit in the next 30 days with authorizing provider or within the authorizing provider's specialty.  See \"Patient Info\" tab in inbasket, or \"Choose Columns\" in Meds & Orders section of the refill encounter.               Rx sent back to pharmacy with note:        Plan discussion:  Stop Buproprion   Citalopram 20mg tab, 1.5 tabs (30mg) daily for mental health   Counting of breathing when stressed. ? Anxiety blanket   Prayer, volunteer opportunities, other things that  involve helping others rather than simply worrying about others   Labs as ordered  Continue other meds for now  Continue improved diet for further weight loss. Activity as able  Call update to nurseline in 2-3 weeks         "

## 2020-04-19 RX ORDER — LEVOTHYROXINE SODIUM 88 UG/1
TABLET ORAL
Qty: 90 TABLET | Refills: 2 | OUTPATIENT
Start: 2020-04-19

## 2020-04-19 RX ORDER — LEVOTHYROXINE SODIUM 100 UG/1
100 TABLET ORAL DAILY
Qty: 30 TABLET | Refills: 5 | Status: SHIPPED | OUTPATIENT
Start: 2020-04-19 | End: 2020-08-25

## 2020-04-20 NOTE — TELEPHONE ENCOUNTER
See note attached to RF request encounter from 4/1/20. Discussed with pt and Levothyroxine dose increased to 100mcg daily

## 2020-04-20 NOTE — TELEPHONE ENCOUNTER
Spoke with pt and reviewed previous labs from Jan including  TSH. MGUS and Light chain labs stable along with Hgb. Will repeat LC and MGUS labs in 6 mos. Pt states that she feels she has been losing weight with better diet and less carbs but TSH elevated. Has been compliant with taking Levothyroxine 88mcg daily. AsTSH likely to drop with same 88mcg dose with weight loss, will only increase Levothyroxine now to 100mcg daily. Pt will repeat NF labs in 6 weeks as ordered. New dose faxed to pharmacy and pt will schedule future labs. Pt has some fatigue now but chronic. Mild chronic SOB same as usual per pt. NO chest pain. Previous  ECHO and stress tests reviewed. HGb has been stable and even slightly better with last lab and will also recheck 6 weeks along with GFR. Pt instructed that if has acute worsening SOB, then to be seen in clinic for exam, CXR,  etc

## 2020-06-15 ENCOUNTER — VIRTUAL VISIT (OUTPATIENT)
Dept: INTERNAL MEDICINE | Facility: CLINIC | Age: 80
End: 2020-06-15
Payer: COMMERCIAL

## 2020-06-15 VITALS — HEIGHT: 65 IN | WEIGHT: 215 LBS | BODY MASS INDEX: 35.82 KG/M2

## 2020-06-15 DIAGNOSIS — E11.22 TYPE 2 DIABETES MELLITUS WITH STAGE 3 CHRONIC KIDNEY DISEASE, WITHOUT LONG-TERM CURRENT USE OF INSULIN (H): ICD-10-CM

## 2020-06-15 DIAGNOSIS — E78.5 HYPERLIPIDEMIA LDL GOAL <100: ICD-10-CM

## 2020-06-15 DIAGNOSIS — E03.9 HYPOTHYROIDISM, UNSPECIFIED TYPE: ICD-10-CM

## 2020-06-15 DIAGNOSIS — G62.9 NEUROPATHY: ICD-10-CM

## 2020-06-15 DIAGNOSIS — N18.30 CKD (CHRONIC KIDNEY DISEASE) STAGE 3, GFR 30-59 ML/MIN (H): ICD-10-CM

## 2020-06-15 DIAGNOSIS — R06.02 SOB (SHORTNESS OF BREATH): ICD-10-CM

## 2020-06-15 DIAGNOSIS — D47.2 MGUS (MONOCLONAL GAMMOPATHY OF UNKNOWN SIGNIFICANCE): ICD-10-CM

## 2020-06-15 DIAGNOSIS — N18.30 TYPE 2 DIABETES MELLITUS WITH STAGE 3 CHRONIC KIDNEY DISEASE, WITHOUT LONG-TERM CURRENT USE OF INSULIN (H): ICD-10-CM

## 2020-06-15 PROCEDURE — 99214 OFFICE O/P EST MOD 30 MIN: CPT | Mod: 95 | Performed by: INTERNAL MEDICINE

## 2020-06-15 ASSESSMENT — MIFFLIN-ST. JEOR: SCORE: 1446.11

## 2020-06-15 NOTE — PROGRESS NOTES
"Mahogany Blanco is a 80 year old female who is being evaluated via a billable telephone visit.      The patient has been notified of following:     \"This telephone visit will be conducted via a call between you and your physician/provider. We have found that certain health care needs can be provided without the need for a physical exam.  This service lets us provide the care you need with a short phone conversation.  If a prescription is necessary we can send it directly to your pharmacy.  If lab work is needed we can place an order for that and you can then stop by our lab to have the test done at a later time.    Telephone visits are billed at different rates depending on your insurance coverage. During this emergency period, for some insurers they may be billed the same as an in-person visit.  Please reach out to your insurance provider with any questions.    If during the course of the call the physician/provider feels a telephone visit is not appropriate, you will not be charged for this service.\"    Patient has given verbal consent for Telephone visit?  Yes    What phone number would you like to be contacted at? [phone]    How would you like to obtain your AVS? Mail a copy    Subjective     Mahogany Blanco is a 80 year old female who presents via phone visit today for the following health issues:    HPI  Hyperlipidemia Follow-Up      Are you regularly taking any medication or supplement to lower your cholesterol?   Yes- pravastatin    Are you having muscle aches or other side effects that you think could be caused by your cholesterol lowering medication?  No    Hypertension Follow-up      Do you check your blood pressure regularly outside of the clinic? Yes     Are you following a low salt diet? No    Are your blood pressures ever more than 140 on the top number (systolic) OR more   than 90 on the bottom number (diastolic), for example 140/90? Yes      How many servings of fruits and vegetables do you " eat daily?  0-1    On average, how many sweetened beverages do you drink each day (Examples: soda, juice, sweet tea, etc.  Do NOT count diet or artificially sweetened beverages)?   0    How many days per week do you exercise enough to make your heart beat faster? 3 or less    How many minutes a day do you exercise enough to make your heart beat faster? 9 or less    How many days per week do you miss taking your medication? 0         Due to the current impact of the Covid19 virus and recommendations by [facility], CDC, etc to limit  clinic visits and pt exposure risk, was recommend pt proceed with virtual phone visit to address acute/stable  medical needs with plan to defer other non-acute health maintenance issues/exam to a future date when less self-isolation is required.    I have reviewed the nursing note as documented above.   See below for other information/data  and my personal notes capturing the substance of my conversation with the patient.       HPI:   Some previous loose stools that resolved with stopping fish oil tabs  Will have some shortness of breath with doing stairs. OK at rest. No change with inhaler therapy. Rare mild chest pressure with the activity. Walked dog in driveway for 15 min though recently without any pain  NO abd pain. Appetite OK. HAs improved diet and now weighs 215 per her report (weighed 260 in October 2018)  Occ numbness in  RUE and rarely left. Generally not in 5th finger. Will occur some in day but worse in AM. Denies much neck pain. No weakness in arms. Not changed with neck ROM. Prior neck CT earlier this year did show stenosis C6-7 bilaterally. Has wrist brace but has not used recently   Hx MGUS and light chain elevation. Fairly stable and Hgb improving  Hx hypothyroidism. Last TSH elevated though pt had been off of med for 2 weeks prior to lab test. Now back on Levothyroxine daily    Component      Latest Ref Rng & Units 3/14/2019   Nucleated RBCs      0 /100     Absolute Neutrophil      1.6 - 8.3 10e9/L    Absolute Lymphocytes      0.8 - 5.3 10e9/L    Absolute Monocytes      0.0 - 1.3 10e9/L    Absolute Eosinophils      0.0 - 0.7 10e9/L    Absolute Basophils      0.0 - 0.2 10e9/L    Abs Immature Granulocytes      0 - 0.4 10e9/L    Absolute Nucleated RBC          Sodium      133 - 144 mmol/L 140   Potassium      3.4 - 5.3 mmol/L 4.7   Chloride      94 - 109 mmol/L 109   Carbon Dioxide      20 - 32 mmol/L 24   Anion Gap      3 - 14 mmol/L 7   Glucose      70 - 99 mg/dL 94   Urea Nitrogen      7 - 30 mg/dL 19   Creatinine      0.52 - 1.04 mg/dL 1.28 (H)   GFR Estimate      >60 mL/min/1.73:m2 40 (L)   GFR Estimate If Black      >60 mL/min/1.73:m2 46 (L)   Calcium      8.5 - 10.1 mg/dL 8.8   Bilirubin Total      0.2 - 1.3 mg/dL 0.4   Albumin      3.4 - 5.0 g/dL 3.6   Protein Total      6.8 - 8.8 g/dL 7.9   Alkaline Phosphatase      40 - 150 U/L 45   ALT      0 - 50 U/L 17   AST      0 - 45 U/L 16   Albumin Fraction      3.7 - 5.1 g/dL 3.8   Alpha 1 Fraction      0.2 - 0.4 g/dL 0.3   Alpha 2 Fraction      0.5 - 0.9 g/dL 0.9   Beta Fraction      0.6 - 1.0 g/dL 0.9   Gamma Fraction      0.7 - 1.6 g/dL 1.7 (H)   Monoclonal Peak      0.0 g/dL 0.2 (H)   ELP Interpretation:       Possible small monoclonal protein (about 0.2 g/dL) seen in the  gamma fraction which is . . .   Cholesterol      <200 mg/dL 141   Triglycerides      <150 mg/dL 122   HDL Cholesterol      >49 mg/dL 49 (L)   LDL Cholesterol Calculated      <100 mg/dL 68   Non HDL Cholesterol      <130 mg/dL 92   IGG      610 - 1,616 mg/dL 1,590   IGA      84 - 499 mg/dL 307   IGM      35 - 242 mg/dL 255   [place] Free Lt Chain      0.33 - 1.94 mg/dL 5.78 (H)   Lambda Free Lt Chain      0.57 - 2.63 mg/dL 4.72 (H)   Kappa Lambda Ratio      0.26 - 1.65 1.22   Hemoglobin A1C      0 - 5.6 % 6.0 (H)   TSH      0.40 - 4.00 mU/L 3.00   Beta-2-Microglobulin      <2.3 mg/L    T4 Free      0.76 - 1.46 ng/dL      Component      Latest Ref  Rng & Units 6/28/2019   Nucleated RBCs      0 /100 0   Absolute Neutrophil      1.6 - 8.3 10e9/L 5.3   Absolute Lymphocytes      0.8 - 5.3 10e9/L 5.2   Absolute Monocytes      0.0 - 1.3 10e9/L 1.2   Absolute Eosinophils      0.0 - 0.7 10e9/L 0.3   Absolute Basophils      0.0 - 0.2 10e9/L 0.0   Abs Immature Granulocytes      0 - 0.4 10e9/L 0.0   Absolute Nucleated RBC       0.0   Sodium      133 - 144 mmol/L    Potassium      3.4 - 5.3 mmol/L    Chloride      94 - 109 mmol/L    Carbon Dioxide      20 - 32 mmol/L    Anion Gap      3 - 14 mmol/L    Glucose      70 - 99 mg/dL    Urea Nitrogen      7 - 30 mg/dL    Creatinine      0.52 - 1.04 mg/dL    GFR Estimate      >60 mL/min/1.73:m2    GFR Estimate If Black      >60 mL/min/1.73:m2    Calcium      8.5 - 10.1 mg/dL    Bilirubin Total      0.2 - 1.3 mg/dL    Albumin      3.4 - 5.0 g/dL    Protein Total      6.8 - 8.8 g/dL    Alkaline Phosphatase      40 - 150 U/L    ALT      0 - 50 U/L    AST      0 - 45 U/L    Albumin Fraction      3.7 - 5.1 g/dL    Alpha 1 Fraction      0.2 - 0.4 g/dL    Alpha 2 Fraction      0.5 - 0.9 g/dL    Beta Fraction      0.6 - 1.0 g/dL    Gamma Fraction      0.7 - 1.6 g/dL    Monoclonal Peak      0.0 g/dL    ELP Interpretation:          Cholesterol      <200 mg/dL    Triglycerides      <150 mg/dL    HDL Cholesterol      >49 mg/dL    LDL Cholesterol Calculated      <100 mg/dL    Non HDL Cholesterol      <130 mg/dL    IGG      610 - 1,616 mg/dL    IGA      84 - 499 mg/dL    IGM      35 - 242 mg/dL    Kappa Free Lt Chain      0.33 - 1.94 mg/dL    Lambda Free Lt Chain      0.57 - 2.63 mg/dL    Kappa Lambda Ratio      0.26 - 1.65    Hemoglobin A1C      0 - 5.6 %    TSH      0.40 - 4.00 mU/L    Beta-2-Microglobulin      <2.3 mg/L    T4 Free      0.76 - 1.46 ng/dL      Component      Latest Ref Rng & Units 1/10/2020   Nucleated RBCs      0 /100    Absolute Neutrophil      1.6 - 8.3 10e9/L    Absolute Lymphocytes      0.8 - 5.3 10e9/L    Absolute  Monocytes      0.0 - 1.3 10e9/L    Absolute Eosinophils      0.0 - 0.7 10e9/L    Absolute Basophils      0.0 - 0.2 10e9/L    Abs Immature Granulocytes      0 - 0.4 10e9/L    Absolute Nucleated RBC          Sodium      133 - 144 mmol/L 137   Potassium      3.4 - 5.3 mmol/L 4.4   Chloride      94 - 109 mmol/L 106   Carbon Dioxide      20 - 32 mmol/L 25   Anion Gap      3 - 14 mmol/L 6   Glucose      70 - 99 mg/dL 102 (H)   Urea Nitrogen      7 - 30 mg/dL 16   Creatinine      0.52 - 1.04 mg/dL 1.46 (H)   GFR Estimate      >60 mL/min/1.73:m2 34 (L)   GFR Estimate If Black      >60 mL/min/1.73:m2 39 (L)   Calcium      8.5 - 10.1 mg/dL 8.9   Bilirubin Total      0.2 - 1.3 mg/dL 0.5   Albumin      3.4 - 5.0 g/dL 3.6   Protein Total      6.8 - 8.8 g/dL 7.6   Alkaline Phosphatase      40 - 150 U/L 41   ALT      0 - 50 U/L 17   AST      0 - 45 U/L 17   Albumin Fraction      3.7 - 5.1 g/dL 3.9   Alpha 1 Fraction      0.2 - 0.4 g/dL 0.3   Alpha 2 Fraction      0.5 - 0.9 g/dL 0.8   Beta Fraction      0.6 - 1.0 g/dL 0.9   Gamma Fraction      0.7 - 1.6 g/dL 1.5   Monoclonal Peak      0.0 g/dL 0.2 (H)   ELP Interpretation:       Small monoclonal protein (0.2 g/dL) seen in the gamma fraction.  Previously characterized . . .   Cholesterol      <200 mg/dL 143   Triglycerides      <150 mg/dL 115   HDL Cholesterol      >49 mg/dL 56   LDL Cholesterol Calculated      <100 mg/dL 64   Non HDL Cholesterol      <130 mg/dL 87   IGG      610 - 1,616 mg/dL 1,420   IGA      84 - 499 mg/dL 339   IGM      35 - 242 mg/dL 255 (H)   [place] Free Lt Chain      0.33 - 1.94 mg/dL 7.33 (H)   Lambda Free Lt Chain      0.57 - 2.63 mg/dL 5.27 (H)   Kappa Lambda Ratio      0.26 - 1.65 1.39   Hemoglobin A1C      0 - 5.6 % 5.5   TSH      0.40 - 4.00 mU/L 14.54 (H)   Beta-2-Microglobulin      <2.3 mg/L 5.0 (H)   T4 Free      0.76 - 1.46 ng/dL 1.04          CT CERVICAL SPINE WITHOUT CONTRAST   2/22/2020 10:07 AM      HISTORY: Neck pain after trauma.     TECHNIQUE:  Axial images of the cervical spine were obtained without  intravenous contrast. Multiplanar reformations were performed.  Radiation dose for this scan was reduced using automated exposure  control, adjustment of the mA and/or kV according to patient size, or  iterative reconstruction technique.     COMPARISON: October 12, 2018 CT cervical spine     FINDINGS: No evidence of fracture. No prevertebral soft tissue  swelling. 1.5 mm degenerative anterolisthesis at C4-C5 and C5-C6.  Otherwise, unremarkable alignment. Vertebral body height is  maintained. No destructive osseous lesions.     Level by level as follows:     C1-C2: Calcification of the transverse ligamentous complex. Patent  central canal. Degenerative osteophytic spurring at the articulation  of the odontoid tip and C1 anterior arch.     C2-C3: Small central disc protrusion. Moderate right facet DJD with  mild ipsilateral foraminal stenosis. Patent central canal.     C3-C4: Central disc osteophyte complex. Patent central canal. Facet  DJD with moderate right and mild left foraminal stenosis.      C4-C5: Disc osteophyte complex. Adequate central canal. Left facet and  uncinate hypertrophic change with severe left foraminal stenosis.      C5-C6: Small disc osteophyte. Patent central canal. Facet DJD with  mild right and minimal left foraminal stenosis.      C6-C7: Broad-based disc osteophyte complex with mild central canal  stenosis. Bilateral uncinate spur with moderate to severe right and  moderate left foraminal stenosis.      C7-T1: Facet degenerative and hypertrophic change. Mild to moderate  right foraminal stenosis. Patent left foramen. Patent central canal.      Visualized paraspinous tissues: Unremarkable.                                                                      IMPRESSION: No evidence of acute fracture or subluxation in the  cervical spine. Cervical spondylosis described level by level above.  No significant interval change.          Additional ROS:   Constitutional, HEENT, Cardiovascular, Pulmonary, GI and , Neuro, MSK and Psych review of systems/symptoms are otherwise negative or unchanged from previous, except as noted above.      ASSESSMENT:    1. Type 2 diabetes mellitus with stage 3 chronic kidney disease, without long-term current use of insulin (H)  Controlled.  Continue diet therapy.  Recheck labs in 6 months  - Hemoglobin A1c; Future    2. Hypothyroidism, unspecified type  TSH elevated patient has been off of medication.  Free T4 level is normal now with patient having started medication again.  Recheck TSH ain 6 weeks as previously ordered    3. SOB (shortness of breath)  Likely multifactorial.  No acute cough or other upper respiratory symptoms.  Await lab results.  If anemia not worsened, will arrange for cardiac stress testing    4. CKD (chronic kidney disease) stage 3, GFR 30-59 ml/min (H)  Stable.  Future labs ordered  - Comprehensive metabolic panel; Future    5. Neuropathy  Patient may try using a wrist brace on right  side at night to see if makes a difference.  Inform physician if no improvement.  If so, will order EMGs to see if distal disease cause or if related to cervical spine findings as above    6. Hyperlipidemia LDL goal <100  Stable.  Continue statin therapy.  Repeat labs 1 year  - Comprehensive metabolic panel; Future  - Lipid panel reflex to direct LDL Fasting; Future     7. MGUS (monoclonal gammopathy of unknown significance)  Stable monoclonal peak.  Slight increase in light chain levels.  Hemoglobin previously stable.  Recheck labs in 3 months.  If worsened further, will refer to hematology        PLAN:  Labs nonfasting  next  week  as ordered when has a ride  If Hgb not worsened, will then arrange for stress test of the heart to r/o ischemia (lack of blood flow to the heart) as cause of shortness of breath  besides weight/deconditioning, etc  Continue meds and diet/exercise  Repeat bone marrow-related  labs nonfasting  in  3 months  Try wearing wrist brace on right wrist at night to see if helps right arm numbness and update me in 4 weeks      Phone call contact time  Call Started at 11:16am  Call Ended at 11:31 am  Total minutes: 15 min    (Chart documentation was completed, in part, with [employer] voice-recognition software. Even though reviewed, some grammatical, spelling, and word errors may remain.)    Aquilino Lilly MD  Internal Medicine Department  [facility]

## 2020-06-30 DIAGNOSIS — E55.9 VITAMIN D DEFICIENCY: ICD-10-CM

## 2020-06-30 DIAGNOSIS — K21.9 GASTROESOPHAGEAL REFLUX DISEASE, ESOPHAGITIS PRESENCE NOT SPECIFIED: ICD-10-CM

## 2020-06-30 DIAGNOSIS — I47.10 SVT (SUPRAVENTRICULAR TACHYCARDIA) (H): ICD-10-CM

## 2020-06-30 DIAGNOSIS — F41.9 ANXIETY: ICD-10-CM

## 2020-06-30 DIAGNOSIS — I10 ESSENTIAL HYPERTENSION, BENIGN: ICD-10-CM

## 2020-06-30 RX ORDER — CHOLECALCIFEROL (VITAMIN D3) 50 MCG
50 TABLET ORAL DAILY
Qty: 100 TABLET | Refills: 2 | Status: SHIPPED | OUTPATIENT
Start: 2020-06-30 | End: 2021-01-01

## 2020-06-30 RX ORDER — METOPROLOL SUCCINATE 25 MG/1
25 TABLET, EXTENDED RELEASE ORAL DAILY
Qty: 90 TABLET | Refills: 2 | Status: SHIPPED | OUTPATIENT
Start: 2020-06-30 | End: 2021-01-01
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
## 2020-06-30 NOTE — TELEPHONE ENCOUNTER
Reason for Call:  Other call back    Detailed comments: Mahogany is asking that a nurse return a call to her to review her medications.    Phone Number Patient can be reached at: Home number on file [phone] (home)    Best Time: any    Can we leave a detailed message on this number? YES    Call taken on 6/30/2020 at 11:16 AM by Danna Castellon

## 2020-07-01 RX ORDER — LISINOPRIL 40 MG/1
40 TABLET ORAL DAILY
Qty: 90 TABLET | Refills: 2 | Status: SHIPPED | OUTPATIENT
Start: 2020-07-01 | End: 2021-01-01

## 2020-07-01 NOTE — TELEPHONE ENCOUNTER
Hx stable CKD overall. OK for RF.  Pt is due for nonfasting labs as ordered in chart. Assist pt with scheduling lab appt in the next 1-2 weeks

## 2020-07-15 DIAGNOSIS — D80.8 LIGHT CHAIN DISEASE (H): ICD-10-CM

## 2020-07-15 DIAGNOSIS — E03.9 HYPOTHYROIDISM, UNSPECIFIED TYPE: ICD-10-CM

## 2020-07-15 DIAGNOSIS — D47.2 MGUS (MONOCLONAL GAMMOPATHY OF UNKNOWN SIGNIFICANCE): ICD-10-CM

## 2020-07-15 DIAGNOSIS — N18.30 CKD (CHRONIC KIDNEY DISEASE) STAGE 3, GFR 30-59 ML/MIN (H): ICD-10-CM

## 2020-07-15 LAB
ANION GAP SERPL CALCULATED.3IONS-SCNC: 8 MMOL/L (ref 3–14)
BUN SERPL-MCNC: 19 MG/DL (ref 7–30)
CALCIUM SERPL-MCNC: 9.1 MG/DL (ref 8.5–10.1)
CHLORIDE SERPL-SCNC: 107 MMOL/L (ref 94–109)
CO2 SERPL-SCNC: 24 MMOL/L (ref 20–32)
CREAT SERPL-MCNC: 1.31 MG/DL (ref 0.52–1.04)
ERYTHROCYTE [DISTWIDTH] IN BLOOD BY AUTOMATED COUNT: 13.3 % (ref 10–15)
GFR SERPL CREATININE-BSD FRML MDRD: 38 ML/MIN/{1.73_M2}
GLUCOSE SERPL-MCNC: 99 MG/DL (ref 70–99)
HCT VFR BLD AUTO: 33.4 % (ref 35–47)
HGB BLD-MCNC: 10.6 G/DL (ref 11.7–15.7)
MCH RBC QN AUTO: 34.6 PG (ref 26.5–33)
MCHC RBC AUTO-ENTMCNC: 31.7 G/DL (ref 31.5–36.5)
MCV RBC AUTO: 109 FL (ref 78–100)
PLATELET # BLD AUTO: 183 10E9/L (ref 150–450)
POTASSIUM SERPL-SCNC: 4.6 MMOL/L (ref 3.4–5.3)
RBC # BLD AUTO: 3.06 10E12/L (ref 3.8–5.2)
SODIUM SERPL-SCNC: 139 MMOL/L (ref 133–144)
TSH SERPL DL<=0.005 MIU/L-ACNC: 1.66 MU/L (ref 0.4–4)
WBC # BLD AUTO: 14.1 10E9/L (ref 4–11)

## 2020-07-15 PROCEDURE — 84443 ASSAY THYROID STIM HORMONE: CPT | Performed by: INTERNAL MEDICINE

## 2020-07-15 PROCEDURE — 85027 COMPLETE CBC AUTOMATED: CPT | Performed by: INTERNAL MEDICINE

## 2020-07-15 PROCEDURE — 36415 COLL VENOUS BLD VENIPUNCTURE: CPT | Performed by: INTERNAL MEDICINE

## 2020-07-15 PROCEDURE — 80048 BASIC METABOLIC PNL TOTAL CA: CPT | Performed by: INTERNAL MEDICINE

## 2020-07-17 ENCOUNTER — ANCILLARY PROCEDURE (OUTPATIENT)
Dept: GENERAL RADIOLOGY | Facility: CLINIC | Age: 80
End: 2020-07-17
Attending: PHYSICIAN ASSISTANT
Payer: COMMERCIAL

## 2020-07-17 ENCOUNTER — OFFICE VISIT (OUTPATIENT)
Dept: INTERNAL MEDICINE | Facility: CLINIC | Age: 80
End: 2020-07-17
Payer: COMMERCIAL

## 2020-07-17 VITALS
DIASTOLIC BLOOD PRESSURE: 60 MMHG | OXYGEN SATURATION: 93 % | WEIGHT: 208 LBS | HEART RATE: 62 BPM | TEMPERATURE: 98.4 F | BODY MASS INDEX: 34.61 KG/M2 | RESPIRATION RATE: 22 BRPM | SYSTOLIC BLOOD PRESSURE: 142 MMHG

## 2020-07-17 DIAGNOSIS — M25.531 RIGHT WRIST PAIN: Primary | ICD-10-CM

## 2020-07-17 DIAGNOSIS — J18.9 PNEUMONIA OF LEFT LOWER LOBE DUE TO INFECTIOUS ORGANISM: ICD-10-CM

## 2020-07-17 DIAGNOSIS — M47.812 CERVICAL SPONDYLOSIS: ICD-10-CM

## 2020-07-17 DIAGNOSIS — R06.02 SHORTNESS OF BREATH: ICD-10-CM

## 2020-07-17 PROBLEM — G62.9 NEUROPATHY: Status: ACTIVE | Noted: 2020-07-17

## 2020-07-17 PROCEDURE — 99214 OFFICE O/P EST MOD 30 MIN: CPT | Performed by: PHYSICIAN ASSISTANT

## 2020-07-17 PROCEDURE — 71046 X-RAY EXAM CHEST 2 VIEWS: CPT

## 2020-07-17 RX ORDER — ALBUTEROL SULFATE 0.83 MG/ML
2.5 SOLUTION RESPIRATORY (INHALATION) EVERY 4 HOURS PRN
Qty: 1 BOX | Refills: 0 | Status: SHIPPED | OUTPATIENT
Start: 2020-07-17 | End: 2020-08-25

## 2020-07-17 RX ORDER — AZITHROMYCIN 250 MG/1
TABLET, FILM COATED ORAL
Qty: 6 TABLET | Refills: 0 | Status: SHIPPED | OUTPATIENT
Start: 2020-07-17 | End: 2020-07-31

## 2020-07-17 NOTE — PATIENT INSTRUCTIONS
Labs nonfasting  next  week  as ordered when has a ride  If Hgb not worsened, will then arrange for stress test of the heart to r/o ischemia (lack of blood flow to the heart) as cause of shortness of breath  besides weight/deconditioning, etc  Continue meds and diet/exercise  Repeat bone marrow-related labs nonfasting  in  3 months  Try wearing wrist brace on right wrist at night to see if helps right arm numbness and update me in 4 weeks

## 2020-07-17 NOTE — PATIENT INSTRUCTIONS
Use albuterol nebulizer treatment to help with breathing  Antibiotics as directed  Recheck in 3-4 weeks with PCP - chest xray

## 2020-07-17 NOTE — PROGRESS NOTES
Subjective     Mahogany Blanco is a 80 year old female who presents to clinic today for the following health issues:    HPI       hand Pain    Onset: on and off for a while    Description:   Location: Right hand  Character: Prickly    Intensity: mild    Progression of Symptoms: worse    Accompanying Signs & Symptoms:  Other symptoms: numbness, tingling and weakness of hand, warmth of hand    History:   Previous similar pain: YES      Precipitating factors:   Trauma or overuse: no     Alleviating factors:  Improved by: nothing    Therapies Tried and outcome: None    Also with concern about SOB. Patient had a virtual visit with PCP last month and this was brought up then. Labs done and stable for her. Requested by PCP to have chest xray done today as she is coming into clinic.   Patient notes some productive coughing at times. Has been using albuterol MDI, though did not use today yet.   No fevers or chills. Appetite has been good. Drinking well.            BP Readings from Last 3 Encounters:   07/17/20 (!) 142/60   02/22/20 108/48   01/10/20 124/72    Wt Readings from Last 3 Encounters:   07/17/20 94.3 kg (208 lb)   06/15/20 97.5 kg (215 lb)   02/22/20 97.5 kg (215 lb)                    Reviewed and updated as needed this visit by Provider  Allergies  Meds         Review of Systems   Constitutional, HEENT, cardiovascular, pulmonary, gi and gu systems are negative, except as otherwise noted.      Objective    BP (!) 142/60 (BP Location: Left arm, Patient Position: Chair, Cuff Size: Adult Regular)   Pulse 62   Temp 98.4  F (36.9  C) (Tympanic)   Resp 22   Wt 94.3 kg (208 lb)   LMP 06/16/1985 (Approximate)   SpO2 93%   BMI 34.61 kg/m    Body mass index is 34.61 kg/m .  Physical Exam   GENERAL: healthy, alert and no distress  RESP: bilateral faint rhonchi noted. No wheezes   CV: regular rates and rhythm and normal S1 S2, no S3 or S4  MS: no gross musculoskeletal defects noted, no edema  SKIN: no suspicious  "lesions or rashes  NEURO: Normal strength and tone, mentation intact and speech normal    Diagnostic Test Results:  CHEST TWO VIEW   7/17/2020 3:47 PM      HISTORY: Shortness of breath.     COMPARISON: 10/29/2018.                                                                      IMPRESSION: New bilateral patchy areas of pulmonary consolidation,  dominantly seen at the left mid to low lung. This may represent  multifocal pneumonia versus other airspace disease such as edema.  Neoplasm remains in the differential as well. Recommend follow-up  chest x-ray within one month. If this does not resolve, recommend CT  chest.     CHITRA VALENTINE MD        Assessment & Plan     1. Right wrist pain  EMG  - NEUROLOGY ADULT REFERRAL    2. Cervical spondylosis  EMG  - NEUROLOGY ADULT REFERRAL    3. Shortness of breath    - XR Chest 2 Views; Future    4. Pneumonia of left lower lobe due to infectious organism  Noted on xray today   - albuterol (PROVENTIL) (2.5 MG/3ML) 0.083% neb solution; Take 1 vial (2.5 mg) by nebulization every 4 hours as needed for shortness of breath / dyspnea or wheezing  Dispense: 1 Box; Refill: 0  - azithromycin (ZITHROMAX) 250 MG tablet; Two tablets first day, then one tablet daily for four days.  Dispense: 6 tablet; Refill: 0     BMI:   Estimated body mass index is 34.61 kg/m  as calculated from the following:    Height as of 6/15/20: 1.651 m (5' 5\").    Weight as of this encounter: 94.3 kg (208 lb).           Patient Instructions   Use albuterol nebulizer treatment to help with breathing  Antibiotics as directed  Recheck in 3-4 weeks with PCP - chest xray       Return in about 3 weeks (around 8/7/2020) for regular primary provider.    Sheri Elmore PA-C  [facility]  "

## 2020-07-22 ENCOUNTER — OFFICE VISIT (OUTPATIENT)
Dept: DERMATOLOGY | Facility: CLINIC | Age: 80
End: 2020-07-22
Payer: COMMERCIAL

## 2020-07-22 VITALS — DIASTOLIC BLOOD PRESSURE: 73 MMHG | SYSTOLIC BLOOD PRESSURE: 132 MMHG

## 2020-07-22 DIAGNOSIS — L82.1 SEBORRHEIC KERATOSES: ICD-10-CM

## 2020-07-22 DIAGNOSIS — L57.0 ACTINIC KERATOSIS: Primary | ICD-10-CM

## 2020-07-22 DIAGNOSIS — L57.8 SOLAR ELASTOSIS: ICD-10-CM

## 2020-07-22 PROCEDURE — 17003 DESTRUCT PREMALG LES 2-14: CPT | Performed by: PHYSICIAN ASSISTANT

## 2020-07-22 PROCEDURE — 99213 OFFICE O/P EST LOW 20 MIN: CPT | Mod: 25 | Performed by: PHYSICIAN ASSISTANT

## 2020-07-22 PROCEDURE — 17000 DESTRUCT PREMALG LESION: CPT | Performed by: PHYSICIAN ASSISTANT

## 2020-07-22 NOTE — PROGRESS NOTES
HPI:  Mahogany Blanco is a 80 year old female patient here today for scaly spot on nose .  Patient states this has been present for a while.  Patient reports the following symptoms: scaly .  Patient reports the following previous treatments: none.  Patient reports the following modifying factors: none.  Associated symptoms: none.  Patient has no other skin complaints today.  Remainder of the HPI, Meds, PMH, Allergies, FH, and SH was reviewed in chart.    Pertinent Hx:   No personal history of skin cancer. Sister had skin cancer.     Past Medical History:   Diagnosis Date     Actinic keratosis 10/09    treated with liquids NO2     Anemia 5/21/2013     B12 deficiency 1/28/2018     Cancer (H) Summer 2013    basal cell cancer 2 spots removed     Carpal tunnel syndrome      Chronic renal insufficiency      CKD (chronic kidney disease) stage 3, GFR 30-59 ml/min (H)      Elevated antinuclear antibody (JACINTO) level 9/22/2017     Esophageal reflux      Essential hypertension, benign      Gout 4/29/2015     Hyperlipidemia LDL goal <100       Lung nodule 08/29/2017    left side, 8mm. Needs repeat CT chest Aug 2018     Major depression       MGUS (monoclonal gammopathy of unknown significance) 9/22/2017     Morbid obesity (H) 10/9/2015     Thrombocytopenia (H) 1/28/2018     Type 2 diabetes mellitus with diabetic chronic kidney disease  (goal A1C <7) 10/9/2015     Unspecified cataract 3/03    bilateral     Unspecified hypothyroidism      Unspecified sleep apnea      Vitamin D deficiency 11/12/2012       Past Surgical History:   Procedure Laterality Date     BONE MARROW BIOPSY, BONE SPECIMEN, NEEDLE/TROCAR  7/9/2014    Procedure: BIOPSY BONE MARROW;  Surgeon: Tony Coleman MD;  Location:  GI     C NONSPECIFIC PROCEDURE      ventral hernia repair     C NONSPECIFIC PROCEDURE      cholecystectomy     C NONSPECIFIC PROCEDURE      umbilical hernia repair     C NONSPECIFIC PROCEDURE      right carpal tunnel syndrome repair     C  NONSPECIFIC PROCEDURE  10/00    stress test (negative)     C NONSPECIFIC PROCEDURE      left cataract extraction     CHOLECYSTECTOMY          Family History   Problem Relation Age of Onset     Cancer Sister         colon cancer     Cancer Sister         left leg cancer, leg amputation     Cancer Sister         skin cancer     Gastrointestinal Disease Sister         liver disease (not cancer)     Musculoskeletal Disorder Daughter         rheumatoid arthritis     Cancer Mother          age 79, lung cancer     Cancer Father          age 81, colon cancer       Social History     Socioeconomic History     Marital status:      Spouse name: Not on file     Number of children: Not on file     Years of education: Not on file     Highest education level: Not on file   Occupational History     Not on file   Social Needs     Financial resource strain: Not on file     Food insecurity     Worry: Not on file     Inability: Not on file     Transportation needs     Medical: Not on file     Non-medical: Not on file   Tobacco Use     Smoking status: Former Smoker     Packs/day: 1.50     Years: 25.00     Pack years: 37.50     Last attempt to quit: 10/14/1996     Years since quittin.7     Smokeless tobacco: Never Used   Substance and Sexual Activity     Alcohol use: Yes     Comment: rare     Drug use: No     Sexual activity: Not Currently   Lifestyle     Physical activity     Days per week: Not on file     Minutes per session: Not on file     Stress: Not on file   Relationships     Social connections     Talks on phone: Not on file     Gets together: Not on file     Attends Yazdanism service: Not on file     Active member of club or organization: Not on file     Attends meetings of clubs or organizations: Not on file     Relationship status: Not on file     Intimate partner violence     Fear of current or ex partner: Not on file     Emotionally abused: Not on file     Physically abused: Not on file      Forced sexual activity: Not on file   Other Topics Concern     Parent/sibling w/ CABG, MI or angioplasty before 65F 55M? Not Asked   Social History Narrative     Not on file       Outpatient Encounter Medications as of 7/22/2020   Medication Sig Dispense Refill     ACCU-CHEK MULTICLIX LANCETS MISC Use to test blood sugar once daily or as directed.       albuterol (PROAIR HFA/PROVENTIL HFA/VENTOLIN HFA) 108 (90 Base) MCG/ACT inhaler INHALE 2 PUFFS BY MOUTH INTO THE LUNGS EVERY 4 HOURS AS NEEDED FOR SHORTNESS OF BREATH OR WHEEZING. 18 g 5     albuterol (PROVENTIL) (2.5 MG/3ML) 0.083% neb solution Take 1 vial (2.5 mg) by nebulization every 4 hours as needed for shortness of breath / dyspnea or wheezing 1 Box 0     albuterol (VENTOLIN HFA) 108 (90 Base) MCG/ACT inhaler INHALE 2 PUFFS BY MOUTH INTO THE LUNGS EVERY 4 HOURS AS NEEDED FOR SHORTNESS OF BREATH OR WHEEZING. 18 g 11     azithromycin (ZITHROMAX) 250 MG tablet Two tablets first day, then one tablet daily for four days. 6 tablet 0     B-12 TR 1000 MCG CR tablet TAKE 1 TABLET BY MOUTH DAILY 90 tablet 3     blood glucose monitoring (ACCU-CHEK NOELLE) test strip Use to test blood sugars 1 time daily or as directed. 50 each 11     blood glucose monitoring (NO BRAND SPECIFIED) meter device kit Use to test blood sugar one  time daily or as directed. 1 kit 0     blood glucose monitoring (NO BRAND SPECIFIED) test strip Use to test blood sugars one time daily or as directed 100 strip 3     citalopram (CELEXA) 20 MG tablet Take 1.5 tablets (30 mg) by mouth daily 145 tablet 3     fluticasone (FLONASE) 50 MCG/ACT nasal spray Spray 1-2 sprays into both nostrils daily 16 g 2     levothyroxine (SYNTHROID/LEVOTHROID) 100 MCG tablet Take 1 tablet (100 mcg) by mouth daily 30 tablet 5     lisinopril (ZESTRIL) 40 MG tablet Take 1 tablet (40 mg) by mouth daily 90 tablet 2     metoprolol succinate ER (TOPROL XL) 25 MG 24 hr tablet Take 1 tablet (25 mg) by mouth daily 90 tablet 2      omeprazole (PRILOSEC) 20 MG DR capsule Take 1 capsule (20 mg) by mouth daily 90 capsule 2     ORDER FOR DME Disp one glucometer that is covered by insurance with 1 RF. Also disp glucometer strips and lancets to check sugars daily. Disp 100 strips and  Lancets  with prn RFs for 1 year 1 Device 11     pravastatin (PRAVACHOL) 40 MG tablet TAKE 1 TABLET BY MOUTH DAILY AT BEDTIME 90 tablet 3     vitamin D3 (CHOLECALCIFEROL) 50 mcg (2000 units) tablet Take 1 tablet (50 mcg) by mouth daily 100 tablet 2     No facility-administered encounter medications on file as of 7/22/2020.        Review Of Systems:  Skin: scaly spot on nose  Eyes: negative  Ears/Nose/Throat: negative  Respiratory: No shortness of breath, dyspnea on exertion, cough, or hemoptysis  Cardiovascular: negative  Gastrointestinal: negative  Genitourinary: negative  Musculoskeletal: negative  Neurologic: negative  Psychiatric: negative  Hematologic/Lymphatic/Immunologic: negative  Endocrine: negative      Objective:     /73   LMP 06/16/1985 (Approximate)   Eyes: Conjunctivae/lids: Normal   ENT: Lips:  Normal  MSK: Normal  Cardiovascular: Peripheral edema none  Pulm: Breathing Normal  Neuro/Psych: Orientation: A/O x 3 Normal; Mood/Affect: Normal, NAD, WDWN  Pt accompanied by: daughter  Following areas examined: face, neck, UE, right buttock  Emanuel skin type:i   Findings:  Brown, stuck-on scaly appearing papules on right buttock  Pink gritty macule/s on nose, dorsal hands, dorsal digits, and dorsal forearms x 7  Rhytides, hypo/hyperpigmentation, and atrophy    Assessment and Plan:  1) Actinic keratoses x 8 and solar elastosis    LN2 for 5 seconds x 2. Discussed AE include hypopigmentation (white spot) and recurrence. Follow up in 2-3 months to recheck lesions. There is a risk of AKs developing into a SCC.   Treatment options include LN2 vs PDT vs Efudex. Pt elected LN2    2) bx proven hak on right 2nd mcp  No evidence of recurrence  Watch and  monitor  Reviewed pathology    3) seborrheic keratosis  I discussed the specifics of tumor, prognosis, and genetics of benign lesions.  I explained that treatment of these lesions would be purely cosmetic and not medically neccessary.  I discussed with patient different removal options. Some of these options include extraction, excision, cryotherapy, cautery and /or laser.  Lesion may recur and/or may not completely resolve. May need additional treatment.           Follow up in yearly FBE. Sooner if aks not resolved-2-3 months

## 2020-07-22 NOTE — LETTER
7/22/2020         RE: Mahogany Blanco  [address] [place] [facility] [id number]        Dear Colleague,    Thank you for referring your patient, Mahogany Blanco, to the [facility]. Please see a copy of my visit note below.    HPI:  Mahogany Blanco is a 80 year old female patient here today for scaly spot on nose .  Patient states this has been present for a while.  Patient reports the following symptoms: scaly .  Patient reports the following previous treatments: none.  Patient reports the following modifying factors: none.  Associated symptoms: none.  Patient has no other skin complaints today.  Remainder of the HPI, Meds, PMH, Allergies, FH, and SH was reviewed in chart.    Pertinent Hx:   No personal history of skin cancer. Sister had skin cancer.     Past Medical History:   Diagnosis Date     Actinic keratosis 10/09    treated with liquids NO2     Anemia 5/21/2013     B12 deficiency 1/28/2018     Cancer (H) Summer 2013    basal cell cancer 2 spots removed     Carpal tunnel syndrome      Chronic renal insufficiency      CKD (chronic kidney disease) stage 3, GFR 30-59 ml/min (H)      Elevated antinuclear antibody (JACINTO) level 9/22/2017     Esophageal reflux      Essential hypertension, benign      Gout 4/29/2015     Hyperlipidemia LDL goal <100       Lung nodule 08/29/2017    left side, 8mm. Needs repeat CT chest Aug 2018     Major depression       MGUS (monoclonal gammopathy of unknown significance) 9/22/2017     Morbid obesity (H) 10/9/2015     Thrombocytopenia (H) 1/28/2018     Type 2 diabetes mellitus with diabetic chronic kidney disease  (goal A1C <7) 10/9/2015     Unspecified cataract 3/03    bilateral     Unspecified hypothyroidism      Unspecified sleep apnea      Vitamin D deficiency 11/12/2012       Past Surgical History:   Procedure Laterality Date     BONE MARROW BIOPSY, BONE SPECIMEN, NEEDLE/TROCAR  7/9/2014    Procedure: BIOPSY BONE MARROW;  Surgeon: Tony Coleman  MD Thanh;  Location:  GI     C NONSPECIFIC PROCEDURE      ventral hernia repair     C NONSPECIFIC PROCEDURE      cholecystectomy     C NONSPECIFIC PROCEDURE      umbilical hernia repair     C NONSPECIFIC PROCEDURE      right carpal tunnel syndrome repair     C NONSPECIFIC PROCEDURE  10/00    stress test (negative)     C NONSPECIFIC PROCEDURE      left cataract extraction     CHOLECYSTECTOMY          Family History   Problem Relation Age of Onset     Cancer Sister         colon cancer     Cancer Sister         left leg cancer, leg amputation     Cancer Sister         skin cancer     Gastrointestinal Disease Sister         liver disease (not cancer)     Musculoskeletal Disorder Daughter         rheumatoid arthritis     Cancer Mother          age 79, lung cancer     Cancer Father          age 81, colon cancer       Social History     Socioeconomic History     Marital status:      Spouse name: Not on file     Number of children: Not on file     Years of education: Not on file     Highest education level: Not on file   Occupational History     Not on file   Social Needs     Financial resource strain: Not on file     Food insecurity     Worry: Not on file     Inability: Not on file     Transportation needs     Medical: Not on file     Non-medical: Not on file   Tobacco Use     Smoking status: Former Smoker     Packs/day: 1.50     Years: 25.00     Pack years: 37.50     Last attempt to quit: 10/14/1996     Years since quittin.7     Smokeless tobacco: Never Used   Substance and Sexual Activity     Alcohol use: Yes     Comment: rare     Drug use: No     Sexual activity: Not Currently   Lifestyle     Physical activity     Days per week: Not on file     Minutes per session: Not on file     Stress: Not on file   Relationships     Social connections     Talks on phone: Not on file     Gets together: Not on file     Attends Congregational service: Not on file     Active member of club or organization:  Not on file     Attends meetings of clubs or organizations: Not on file     Relationship status: Not on file     Intimate partner violence     Fear of current or ex partner: Not on file     Emotionally abused: Not on file     Physically abused: Not on file     Forced sexual activity: Not on file   Other Topics Concern     Parent/sibling w/ CABG, MI or angioplasty before 65F 55M? Not Asked   Social History Narrative     Not on file       Outpatient Encounter Medications as of 7/22/2020   Medication Sig Dispense Refill     ACCU-CHEK MULTICLIX LANCETS MISC Use to test blood sugar once daily or as directed.       albuterol (PROAIR HFA/PROVENTIL HFA/VENTOLIN HFA) 108 (90 Base) MCG/ACT inhaler INHALE 2 PUFFS BY MOUTH INTO THE LUNGS EVERY 4 HOURS AS NEEDED FOR SHORTNESS OF BREATH OR WHEEZING. 18 g 5     albuterol (PROVENTIL) (2.5 MG/3ML) 0.083% neb solution Take 1 vial (2.5 mg) by nebulization every 4 hours as needed for shortness of breath / dyspnea or wheezing 1 Box 0     albuterol (VENTOLIN HFA) 108 (90 Base) MCG/ACT inhaler INHALE 2 PUFFS BY MOUTH INTO THE LUNGS EVERY 4 HOURS AS NEEDED FOR SHORTNESS OF BREATH OR WHEEZING. 18 g 11     azithromycin (ZITHROMAX) 250 MG tablet Two tablets first day, then one tablet daily for four days. 6 tablet 0     B-12 TR 1000 MCG CR tablet TAKE 1 TABLET BY MOUTH DAILY 90 tablet 3     blood glucose monitoring (ACCU-CHEK NOELLE) test strip Use to test blood sugars 1 time daily or as directed. 50 each 11     blood glucose monitoring (NO BRAND SPECIFIED) meter device kit Use to test blood sugar one  time daily or as directed. 1 kit 0     blood glucose monitoring (NO BRAND SPECIFIED) test strip Use to test blood sugars one time daily or as directed 100 strip 3     citalopram (CELEXA) 20 MG tablet Take 1.5 tablets (30 mg) by mouth daily 145 tablet 3     fluticasone (FLONASE) 50 MCG/ACT nasal spray Spray 1-2 sprays into both nostrils daily 16 g 2     levothyroxine (SYNTHROID/LEVOTHROID) 100 MCG  tablet Take 1 tablet (100 mcg) by mouth daily 30 tablet 5     lisinopril (ZESTRIL) 40 MG tablet Take 1 tablet (40 mg) by mouth daily 90 tablet 2     metoprolol succinate ER (TOPROL XL) 25 MG 24 hr tablet Take 1 tablet (25 mg) by mouth daily 90 tablet 2     omeprazole (PRILOSEC) 20 MG DR capsule Take 1 capsule (20 mg) by mouth daily 90 capsule 2     ORDER FOR DME Disp one glucometer that is covered by insurance with 1 RF. Also disp glucometer strips and lancets to check sugars daily. Disp 100 strips and  Lancets  with prn RFs for 1 year 1 Device 11     pravastatin (PRAVACHOL) 40 MG tablet TAKE 1 TABLET BY MOUTH DAILY AT BEDTIME 90 tablet 3     vitamin D3 (CHOLECALCIFEROL) 50 mcg (2000 units) tablet Take 1 tablet (50 mcg) by mouth daily 100 tablet 2     No facility-administered encounter medications on file as of 7/22/2020.        Review Of Systems:  Skin: scaly spot on nose  Eyes: negative  Ears/Nose/Throat: negative  Respiratory: No shortness of breath, dyspnea on exertion, cough, or hemoptysis  Cardiovascular: negative  Gastrointestinal: negative  Genitourinary: negative  Musculoskeletal: negative  Neurologic: negative  Psychiatric: negative  Hematologic/Lymphatic/Immunologic: negative  Endocrine: negative      Objective:     /73   LMP 06/16/1985 (Approximate)   Eyes: Conjunctivae/lids: Normal   ENT: Lips:  Normal  MSK: Normal  Cardiovascular: Peripheral edema none  Pulm: Breathing Normal  Neuro/Psych: Orientation: A/O x 3 Normal; Mood/Affect: Normal, NAD, WDWN  Pt accompanied by: daughter  Following areas examined: face, neck, UE, right buttock  Emanuel skin type:i   Findings:  Brown, stuck-on scaly appearing papules on right buttock  Pink gritty macule/s on nose, dorsal hands, dorsal digits, and dorsal forearms x 7  Rhytides, hypo/hyperpigmentation, and atrophy    Assessment and Plan:  1) Actinic keratoses x 8 and solar elastosis    LN2 for 5 seconds x 2. Discussed AE include hypopigmentation (white  spot) and recurrence. Follow up in 2-3 months to recheck lesions. There is a risk of AKs developing into a SCC.   Treatment options include LN2 vs PDT vs Efudex. Pt elected LN2    2) bx proven hak on right 2nd mcp  No evidence of recurrence  Watch and monitor  Reviewed pathology    3) seborrheic keratosis  I discussed the specifics of tumor, prognosis, and genetics of benign lesions.  I explained that treatment of these lesions would be purely cosmetic and not medically neccessary.  I discussed with patient different removal options. Some of these options include extraction, excision, cryotherapy, cautery and /or laser.  Lesion may recur and/or may not completely resolve. May need additional treatment.           Follow up in yearly FBE. Sooner if aks not resolved-2-3 months      Again, thank you for allowing me to participate in the care of your patient.        Sincerely,        Ludy Tesfaye PA-C

## 2020-07-22 NOTE — PATIENT INSTRUCTIONS
Proper skin care from [facility]:    -Eliminate harsh soaps as they strip the natural oils from the skin, often resulting in dry itchy skin ( i.e. Dial, Zest, [place])  -Use mild soaps such as Cetaphil or Dove Sensitive Skin in the shower. You do not need to use soap on arms, legs, and trunk every time you shower unless visibly soiled.   -Avoid hot or cold showers.  -After showering, lightly dry off and apply moisturizing within 2-3 minutes. This will help trap moisture in the skin.   -Aggressive use of a moisturizer at least 1-2 times a day to the entire body (including -Vanicream, Cetaphil, Aquaphor or Cerave) and moisturize hands after every washing.  -We recommend using moisturizers that come in a tub that needs to be scooped out, not a pump. This has more of an oil base. It will hold moisture in your skin much better than a water base moisturizer. The above recommended are non-pore clogging.      Wear a sunscreen with at least SPF 30 on your face, ears, neck and V of the chest daily. Wear sunscreen on other areas of the body if those areas are exposed to the sun throughout the day. Sunscreens can contain physical and/or chemical blockers. Physical blockers are less likely to clog pores, these include zinc oxide and titanium dioxide. Reapply every two hour and after swimming. Sunscreen examples include Neutrogena, CeraVe, Blue Lizard, Elta MD and many others.    UV radiation  UVA radiation remains constant throughout the day and throughout the year. It is a longer wavelength than UVB and therefore penetrates deeper into the skin leading to immediate and delayed tanning, photoaging, and skin cancer. 70-80% of UVA and UVB radiation occurs between the hours of 10am-2pm.  UVB radiation  UVB radiation causes the most harmful effects and is more significant during the summer months. However, snow and ice can reflect UVB radiation leading to skin damage during the winter months as well. UVB radiation is  responsible for tanning, burning, inflammation, delayed erythema (pinkness), pigmentation (brown spots), and skin cancer.       WOUND CARE INSTRUCTIONS  FOR CRYOSURGERY        This area treated with liquid nitrogen will form a blister. You do not need to bandage the area until after the blister forms and breaks (which may be a few days).  When the blister breaks, begin daily dressing changes as follows:    1) Clean and dry the area with tap water using clean Q-tip or sterile gauze pad.    2) Apply Polysporin ointment or Bacitracin ointment over entire wound.  Do NOT use Neosporin ointment.    3) Cover the wound with a band-aid or sterile non-stick gauze pad and micropore paper tape.      REPEAT THESE INSTRUCTIONS AT LEAST ONCE A DAY UNTIL THE WOUND HAS COMPLETELY HEALED.        It is an old wives tale that a wound heals better when it is exposed to air and allowed to dry out. The wound will heal faster with a better cosmetic result if it is kept moist with ointment and covered with a bandage.  Do not let the wound dry out.      Supplies Needed:     *Cotton tipped applicators (Q-tips)   *Polysporin ointment or Bacitracin ointment (NOT NEOSPORIN)   *Band-aids, or non stick gauze pads and micropore paper tape    PATIENT INFORMATION    During the healing process you will notice a number of changes. All wounds develop a small halo of redness surrounding the wound.  This means healing is occurring. Severe itching with extensive redness usually indicates sensitivity to the ointment or bandage tape used to dress the wound.  You should call our office if this develops.      Swelling and/or discoloration around your surgical site is common, particularly when performed around the eye.    All wounds normally drain.  The larger the wound the more drainage there will be.  After 7-10 days, you will notice the wound beginning to shrink and new skin will begin to grow.  The wound is healed when you can see skin has formed over the  entire area.  A healed wound has a healthy, shiny look to the surface and is red to dark pink in color to normalize.  Wounds may take approximately 4-6 weeks to heal.  Larger wounds may take 6-8 weeks.  After the wound is healed you may discontinue dressing changes.    You may experience a sensation of tightness as your wound heals. This is normal and will gradually subside.    Your healed wound may be sensitive to temperature changes. This sensitivity improves with time, but if you re having a lot of discomfort, try to avoid temperature extremes.    Patients frequently experience itching after their wound appears to have healed because of the continue healing under the skin.  Plain Vaseline will help relieve the itching.

## 2020-07-31 ENCOUNTER — ANCILLARY PROCEDURE (OUTPATIENT)
Dept: GENERAL RADIOLOGY | Facility: CLINIC | Age: 80
End: 2020-07-31
Attending: INTERNAL MEDICINE
Payer: COMMERCIAL

## 2020-07-31 ENCOUNTER — OFFICE VISIT (OUTPATIENT)
Dept: INTERNAL MEDICINE | Facility: CLINIC | Age: 80
End: 2020-07-31
Payer: COMMERCIAL

## 2020-07-31 DIAGNOSIS — R06.02 SOB (SHORTNESS OF BREATH): ICD-10-CM

## 2020-07-31 DIAGNOSIS — M25.531 RIGHT WRIST PAIN: Primary | ICD-10-CM

## 2020-07-31 DIAGNOSIS — M25.531 RIGHT WRIST PAIN: ICD-10-CM

## 2020-07-31 DIAGNOSIS — R91.8 LUNG INFILTRATE: ICD-10-CM

## 2020-07-31 PROCEDURE — 99214 OFFICE O/P EST MOD 30 MIN: CPT | Performed by: INTERNAL MEDICINE

## 2020-07-31 PROCEDURE — 73110 X-RAY EXAM OF WRIST: CPT | Mod: RT

## 2020-07-31 ASSESSMENT — PATIENT HEALTH QUESTIONNAIRE - PHQ9: SUM OF ALL RESPONSES TO PHQ QUESTIONS 1-9: 0

## 2020-07-31 NOTE — PROGRESS NOTES
"Subjective     Mahogany Blanco is a 80 year old female who presents to clinic today for the following health issues:    HPI   Chief Complaint   Patient presents with     Follow Up     for OV on 07/17/2020 with Sheri Malave     Clinic note from PA visit on 7/17/2020 reviewed.  Patient was noted to have possible pneumonia and treated with azithromycin and needs to have repeat chest x-ray done.  Was also referred for EMG RUE for right wrist pain and some numbness in  RUE   CXR then showed:    IMPRESSION: New bilateral patchy areas of pulmonary consolidation,  dominantly seen at the left mid to low lung. This may represent  multifocal pneumonia versus other airspace disease such as edema.  Neoplasm remains in the differential as well. Recommend follow-up  chest x-ray within one month. If this does not resolve, recommend CT  chest.    Patient denies current fevers or chills.  Has some chronic shortness of breath.  Not coughing.  Denies chest pain.  Still has pain in the right wrist area.  Occasional numbness and all her fingers and not just in a median nerve distribution.  Not dropping things.  Has some general arthritis symptoms in other general joints.       Additional ROS:   Constitutional, HEENT, Cardiovascular, Pulmonary, GI and , Neuro, MSK and Psych review of systems/symptoms are otherwise negative or unchanged from previous, except as noted above.      OBJECTIVE:  /76   Pulse 66   Temp 97.7  F (36.5  C)   Resp 16   Wt 94.8 kg (209 lb)   LMP 06/16/1985 (Approximate)   SpO2 93%   BMI 34.78 kg/m     Estimated body mass index is 34.61 kg/m  as calculated from the following:    Height as of 6/15/20: 1.651 m (5' 5\").    Weight as of 7/17/20: 94.3 kg (208 lb).     Neck: no adenopathy. Thyroid normal to palpation. No bruits  Pulm: Mild crackle bilateral lung bases but more on the left.  No wheeze or rhonchi  CV: Regular rates and rhythm  GI: Soft, obese, nontender, Normal active bowel sounds, No " hepatosplenomegaly or masses palpable  Ext: Peripheral pulses intact. Trace BLE ankle edema.  Neuro: Normal strength and tone, sensory exam grossly normal  LUE and BLE. Mild reduced LTS distal all 5 fingers  RUE   MSK: Mild tenderness to flexion extension right wrist.  DIP and PIP joints of hands bilaterally with OA changes.  No increased warmth/erythema at the wrist.  Mild tenderness to full range of motion of the neck and palpation bilateral paracervical musculature        Assessment & Plan   1. Right wrist pain  X-ray of the wrist was obtained today.  Shows significant arthritis changes per my view and no obvious fracture.  Patient to see Ortho for further treat including possible cortisone injection.  Wait for radiology reading. Also await EMG study ordered by PA. No results in chart now to review  - XR Wrist Right G/E 3 Views; Future  - Orthopedic & Spine  Referral; Future    2. SOB (shortness of breath)  Patient still with shortness of breath despite antibiotic therapy.  We will add Advair and repeat chest x-ray in a couple weeks to assess for change.  If no improvement on x-ray findings, will then obtain CT of the chest to rule out other inflammatory process.  Patient has not had other symptoms to suggest Covid infection  - fluticasone-salmeterol (ADVAIR DISKUS) 100-50 MCG/DOSE inhaler; Inhale 1 puff into the lungs every 12 hours  Dispense: 1 Inhaler; Refill: 11    3. Lung infiltrate  Patient has completed antibiotic therapy.  No change in symptoms overall over.  No other recent fevers, chills, etc. to suggest Covid infection.  Repeat chest x-ray in a couple weeks.  If unchanged, will get CT of the chest and refer to pulmonary  - XR Chest 2 Views; Future     PLAN:  Wrist x-ray today  Schedule [facility] radiology appointment in 2 weeks to repeat chest x-ray  Fluticasone-salmeterol (ADVAIR DISKUS) 100-50 MCG/DOSE inhaler; Inhale 1 puff into the lungs every 12 hours   Await results of EMG study from  "neurology  Reduce calorie/carbohydrate (sugar, bread, potato, pasta, rice, alcohol etc)  intake in diet and increase color on your plate with fruits and vegetables for weight loss   Referral to ortho re: wrist pain and degenerative/arthritis changes. Possible cortisone injection trial. Ortho schedulers will call you to schedule toe appointment      BMI:   Estimated body mass index is 34.61 kg/m  as calculated from the following:    Height as of 6/15/20: 1.651 m (5' 5\").    Weight as of 7/17/20: 94.3 kg (208 lb).   Weight management plan: Discussed healthy diet and exercise guidelines          Aquilino Lilly MD  [facility]  "

## 2020-08-03 ENCOUNTER — TELEPHONE (OUTPATIENT)
Dept: INTERNAL MEDICINE | Facility: CLINIC | Age: 80
End: 2020-08-03

## 2020-08-03 NOTE — TELEPHONE ENCOUNTER
Reason for Call:  Other call back    Detailed comments: wants results from MRI on saturday    Phone Number Patient can be reached at: Home number on file [phone] (home)    Best Time: any    Can we leave a detailed message on this number? YES    Call taken on 8/3/2020 at 5:33 PM by Rosales Donato

## 2020-08-06 ENCOUNTER — TELEPHONE (OUTPATIENT)
Dept: INTERNAL MEDICINE | Facility: CLINIC | Age: 80
End: 2020-08-06

## 2020-08-06 NOTE — TELEPHONE ENCOUNTER
Reason for Call:  Other call back    Detailed comments: The patients daughter Anca called and stated that her mother had an appointment with Dr. Lilly on 7/31 to follow up on pneumonia that she had and discuss wrist pain. He had told them he was going to prescribe her a nebulizer solution that she would only need to do once a day but the prescription never got sent to her pharmacy. She also has questions about results from an MRI that her mother had done and was also told her mother needed to follow up and have her lungs checked but was not given a time line of when to schedule the follow up appointment. She would like a call back to discuss these things and get some clarification.     Phone Number Patient can be reached at: Other phone number: [phone]    Best Time: Any    Can we leave a detailed message on this number? YES    Call taken on 8/6/2020 at 2:32 PM by Marija Rich

## 2020-08-06 NOTE — TELEPHONE ENCOUNTER
Tried to call rd SANTOS LM. Then called pt's daughter Yessenia who is with pt now. MRI cervical ordered by [facility] and don't have access to results and they will have to call neuro for that. Reviewed wrist Xray. Pt to see [facility] ([facility]) for wrist steroid injection if sx remain of soreness. WIll have repeat CXR mid Aug and daughter will call to schedule at [facility] to f/u on infiltrates. No cough now. WIll use generic Advair 1 inhalation BID and rinse mouth after use since Albuterol has been beneficial for breathing. Per review [employer] website re: formulary, generic Advair is Tier 1. Will update me if breathing not remaining better with med. Rare cough now. No F/C per daughter report from pt

## 2020-08-19 ENCOUNTER — ANCILLARY PROCEDURE (OUTPATIENT)
Dept: GENERAL RADIOLOGY | Facility: CLINIC | Age: 80
End: 2020-08-19
Attending: INTERNAL MEDICINE
Payer: COMMERCIAL

## 2020-08-19 DIAGNOSIS — R91.8 LUNG INFILTRATE: ICD-10-CM

## 2020-08-19 PROCEDURE — 71046 X-RAY EXAM CHEST 2 VIEWS: CPT

## 2020-08-20 ENCOUNTER — TELEPHONE (OUTPATIENT)
Dept: INTERNAL MEDICINE | Facility: CLINIC | Age: 80
End: 2020-08-20

## 2020-08-20 DIAGNOSIS — N18.30 CKD (CHRONIC KIDNEY DISEASE) STAGE 3, GFR 30-59 ML/MIN (H): Primary | ICD-10-CM

## 2020-08-20 NOTE — TELEPHONE ENCOUNTER
Reason for Call:  Other call back    Detailed comments: The patient called and stated that she had a chest x-ray done on 8/19 and she is wondering about results from this imaging. She would like a call back to discuss this.     Phone Number Patient can be reached at: Home number on file [phone] (home)    Best Time: Any    Can we leave a detailed message on this number? YES    Call taken on 8/20/2020 at 10:39 AM by Marija Rich

## 2020-08-20 NOTE — TELEPHONE ENCOUNTER
Spoke with pt and reviewed CXR. Unchanged consolidation LLL. Has SOB walking her dog in the driveway.  This is unchanged from previous.  Denies fevers, chills, cough.  Needs CT of the chest to define the lungs further.  Last GFR from 1 month ago was 38.  Needs GFR of 40 or more to use contrast per CT tech.  Patient will have repeat nonfasting BMP done in the next few days to recheck kidney function.  Based on that result, will then order CT of the chest either with or without contrast.  Patient asked me to call her daughter Yessenia to discuss all this further which I did and she will assist with scheduling lab appointment

## 2020-08-21 DIAGNOSIS — N18.30 CKD (CHRONIC KIDNEY DISEASE) STAGE 3, GFR 30-59 ML/MIN (H): ICD-10-CM

## 2020-08-21 PROCEDURE — 80048 BASIC METABOLIC PNL TOTAL CA: CPT | Performed by: INTERNAL MEDICINE

## 2020-08-21 PROCEDURE — 36415 COLL VENOUS BLD VENIPUNCTURE: CPT | Performed by: INTERNAL MEDICINE

## 2020-08-22 LAB
ANION GAP SERPL CALCULATED.3IONS-SCNC: 9 MMOL/L (ref 3–14)
BUN SERPL-MCNC: 20 MG/DL (ref 7–30)
CALCIUM SERPL-MCNC: 8.8 MG/DL (ref 8.5–10.1)
CHLORIDE SERPL-SCNC: 109 MMOL/L (ref 94–109)
CO2 SERPL-SCNC: 23 MMOL/L (ref 20–32)
CREAT SERPL-MCNC: 1.32 MG/DL (ref 0.52–1.04)
GFR SERPL CREATININE-BSD FRML MDRD: 38 ML/MIN/{1.73_M2}
GLUCOSE SERPL-MCNC: 109 MG/DL (ref 70–99)
POTASSIUM SERPL-SCNC: 4.5 MMOL/L (ref 3.4–5.3)
SODIUM SERPL-SCNC: 141 MMOL/L (ref 133–144)

## 2020-08-24 DIAGNOSIS — E03.9 HYPOTHYROIDISM, UNSPECIFIED TYPE: ICD-10-CM

## 2020-08-25 ENCOUNTER — TELEPHONE (OUTPATIENT)
Dept: INTERNAL MEDICINE | Facility: CLINIC | Age: 80
End: 2020-08-25

## 2020-08-25 DIAGNOSIS — J18.9 PNEUMONIA OF LEFT LOWER LOBE DUE TO INFECTIOUS ORGANISM: ICD-10-CM

## 2020-08-25 DIAGNOSIS — R93.89 ABNORMAL CXR: Primary | ICD-10-CM

## 2020-08-25 RX ORDER — LEVOTHYROXINE SODIUM 100 UG/1
TABLET ORAL
Qty: 30 TABLET | Refills: 10 | Status: SHIPPED | OUTPATIENT
Start: 2020-08-25 | End: 2020-11-10

## 2020-08-25 RX ORDER — ALBUTEROL SULFATE 0.83 MG/ML
2.5 SOLUTION RESPIRATORY (INHALATION) EVERY 6 HOURS PRN
Qty: 3 BOX | Refills: 3 | Status: SHIPPED | OUTPATIENT
Start: 2020-08-25

## 2020-08-28 ENCOUNTER — TRANSFERRED RECORDS (OUTPATIENT)
Dept: HEALTH INFORMATION MANAGEMENT | Facility: CLINIC | Age: 80
End: 2020-08-28

## 2020-09-02 ENCOUNTER — HOSPITAL ENCOUNTER (OUTPATIENT)
Dept: CT IMAGING | Facility: CLINIC | Age: 80
Discharge: HOME | End: 2020-09-02
Attending: INTERNAL MEDICINE | Admitting: INTERNAL MEDICINE
Payer: COMMERCIAL

## 2020-09-02 DIAGNOSIS — R93.89 ABNORMAL CXR: ICD-10-CM

## 2020-09-02 PROCEDURE — 71250 CT THORAX DX C-: CPT

## 2020-09-10 ENCOUNTER — TELEPHONE (OUTPATIENT)
Dept: INTERNAL MEDICINE | Facility: CLINIC | Age: 80
End: 2020-09-10

## 2020-09-10 DIAGNOSIS — J84.10 PULMONARY FIBROSIS (H): Primary | ICD-10-CM

## 2020-09-10 NOTE — TELEPHONE ENCOUNTER
Patient calling wanting CT results from 9/2. Please advise. Would like to know as soon as possible.

## 2020-09-11 NOTE — TELEPHONE ENCOUNTER
Spoke with patient's daughter Yessenia and also with patient.  Referred to [place] [facility].  I will speak with them on Monday to try to expedite appointment for patient.  If daughter does not hear back from lung clinic by next Monday afternoon, she will contact them for appointment.  CT scan showing pulmonary fibrosis.  Previous O2 levels at 93%.  We will also see about getting patient into clinic next week for O2 check at rest and with exertion to see if qualifies for oxygen therapy

## 2020-09-11 NOTE — TELEPHONE ENCOUNTER
Patient's daughter is very upset that her mom's Dr have not called with these results and the next step. She states her mom's breathing is getting worst. She would like a call back today.

## 2020-09-15 ENCOUNTER — TELEPHONE (OUTPATIENT)
Dept: INTERNAL MEDICINE | Facility: CLINIC | Age: 80
End: 2020-09-15

## 2020-09-17 ENCOUNTER — TELEPHONE (OUTPATIENT)
Dept: INTERNAL MEDICINE | Facility: CLINIC | Age: 80
End: 2020-09-17

## 2020-09-17 ENCOUNTER — ALLIED HEALTH/NURSE VISIT (OUTPATIENT)
Dept: NURSING | Facility: CLINIC | Age: 80
End: 2020-09-17
Payer: COMMERCIAL

## 2020-09-17 DIAGNOSIS — J84.112 IPF (IDIOPATHIC PULMONARY FIBROSIS) (H): ICD-10-CM

## 2020-09-17 DIAGNOSIS — R06.02 SOB (SHORTNESS OF BREATH): Primary | ICD-10-CM

## 2020-09-17 DIAGNOSIS — R06.02 SHORTNESS OF BREATH: Primary | ICD-10-CM

## 2020-09-17 PROCEDURE — 99207 ZZC NO CHARGE NURSE ONLY: CPT

## 2020-09-17 NOTE — TELEPHONE ENCOUNTER
Patient was tested for Oxygen today at 220pm.  Patient was at 99% without Oxygen Resting   Patient was at 82% without Oxygen Walking    Patient was at 96 to 99% with Oxygen Walking

## 2020-09-17 NOTE — TELEPHONE ENCOUNTER
Call to confirm nurse only appointment today at 220. Patient is to ask for Sheri Lilly's Assistant.

## 2020-09-17 NOTE — PROGRESS NOTES
Chief Complaint   Patient presents with     OXYGEN CHECK     per Dr. Lilly Patient in Clinic for O2 check     Patient O2 resting-99%  Patient O2 walking-82%  Both without Oxygen     Patient O2 walking with Oxygen-96 to 99%

## 2020-09-18 ENCOUNTER — TELEPHONE (OUTPATIENT)
Dept: INTERNAL MEDICINE | Facility: CLINIC | Age: 80
End: 2020-09-18

## 2020-09-18 NOTE — TELEPHONE ENCOUNTER
Spoke with [facility] and they will be out to pt's home tomorrow to get O2 set up. Spoke with pt and informed her that O2 being delivered tomorrow. Spoke with daughter Marychuy and updated her too and that [facility] will call her prior to coming out. Pt has appt  9/24/20 with Dr Fulton form [facility] re: pulm fibrosis and will speak with him prior to be sure he has medical records and will then await his consultation. ? effect of light chain disease on lungs, ? amyloid

## 2020-09-18 NOTE — TELEPHONE ENCOUNTER
I certify that this patient, Mahogany Blanco has been under my care (or a nurse practitioner or physican's assistant working with me). This is the face-to-face encounter for oxygen medical necessity.      Mahogany Blanco is now in a chronic stable state and continues to require supplemental oxygen. Patient has continued oxygen desaturation due to Pulmonary Fibrosis J84.10.    Alternative treatment(s) tried or considered and deemed clinically infective for treatment of Pulmonary Fibrosis J84.10 include inhalers.  If portability is ordered, is the patient mobile within the home? yes    **Patients who qualify for home O2 coverage under the CMS guidelines require ABG tests or O2 sat readings obtained closest to, but no earlier than 2 days prior to the discharge, as evidence of the need for home oxygen therapy. Testing must be performed while patient is in the chronic stable state. See notes for O2 sats.**

## 2020-09-18 NOTE — TELEPHONE ENCOUNTER
Patient Quality Outreach      Summary:    Patient is due/failing the following:   A1C, Eye Exam and Microalbumin, Annual wellness, date due: 2005 and Immunizations    Type of outreach:    Sent letter.    Questions for provider review:    None                                                                                   **Start Working phrase here:**       Patient has the following on her problem list/HM:     Diabetes    Last A1C:  Lab Results   Component Value Date    A1C 5.5 01/10/2020    A1C 6.0 2019       Last LDL:    Lab Results   Component Value Date    LDL 64 01/10/2020       Is the patient on a Statin? Yes          Is the patient on Aspirin? No    Medications     HMG CoA Reductase Inhibitors     pravastatin (PRAVACHOL) 40 MG tablet             Last three blood pressure readings:  BP Readings from Last 3 Encounters:   20 132/73   20 (!) 142/60   20 108/48            Tobacco Use      Smoking status: Former Smoker        Packs/day: 1.50        Years: 25.00        Pack years: 37.5        Quit date: 10/14/1996        Years since quittin.9      Smokeless tobacco: Never Used                                                      Sheri Darling CMA       Chart routed to none.

## 2020-09-18 NOTE — LETTER
[facility]  [address] [address]  [phone]  September 18, 2020    Mahogany Blanco  [address]  [facility] [address]    Dear Mahogany,    We care about your health and based on a review of your medical records, recommend the the following, to better manage your health:      You are in particular need of attention regarding:  -Wellness (Physical) Visit     I am recommending that you:     -schedule a WELLNESS (Physical) APPOINTMENT with me.       Please call us at [phone] or [phone] (or use [employer]) to address the above recommendations.     Thank you for trusting [facility].  We appreciate the opportunity to serve you and look forward to supporting your healthcare needs in the future.    If you have (or plan to have) any of these tests done at a facility other than a [facility] or a [facility], please have the results from these tests sent to your primary physician at [facility].    Healthy Regards,    Aquilino Lilly MD/Sheri Darling, [facility]

## 2020-09-18 NOTE — TELEPHONE ENCOUNTER
O2 order signed. Page out to  HC to try and get O2 deliveredthis weekend rather than waiting until next week as [facility] office otherwise closed now at 4:30. Waiting call back

## 2020-09-19 ENCOUNTER — DOCUMENTATION ONLY (OUTPATIENT)
Dept: BEHAVIORAL HEALTH | Facility: CLINIC | Age: 80
End: 2020-09-19

## 2020-09-19 NOTE — PROGRESS NOTES
Received oxygen intake at 5:02 last night, Spoke with Dr. Lilly, and helped walk him through how to qualify this patient for home oxygen. I told him we now had everything we would need to set this patient up with home oxygen and that I would reach out to the patient 9/19 to coordinate setup. Dr. Lilly had mentioned that I should reach out to the patients daughter Marychuy to get this completed.  9/19/2020:  9:58am- I left a voicemail on Marychuy's Cell to call me back directly to discuss the delivery of this patients oxygen for this weekend.  10:40am- Spoke with Marychuy, scheduled delivery of oxygen and education for today.

## 2020-09-24 ENCOUNTER — TRANSFERRED RECORDS (OUTPATIENT)
Dept: HEALTH INFORMATION MANAGEMENT | Facility: CLINIC | Age: 80
End: 2020-09-24

## 2020-09-28 ENCOUNTER — MEDICAL CORRESPONDENCE (OUTPATIENT)
Dept: HEALTH INFORMATION MANAGEMENT | Facility: CLINIC | Age: 80
End: 2020-09-28

## 2020-10-08 DIAGNOSIS — D80.8 LIGHT CHAIN DISEASE (H): ICD-10-CM

## 2020-10-08 DIAGNOSIS — D47.2 MGUS (MONOCLONAL GAMMOPATHY OF UNKNOWN SIGNIFICANCE): ICD-10-CM

## 2020-10-08 DIAGNOSIS — J84.9 INTERSTITIAL PULMONARY DISEASE, UNSPECIFIED (H): Primary | ICD-10-CM

## 2020-10-08 DIAGNOSIS — N18.30 CKD (CHRONIC KIDNEY DISEASE) STAGE 3, GFR 30-59 ML/MIN (H): ICD-10-CM

## 2020-10-08 LAB
ERYTHROCYTE [DISTWIDTH] IN BLOOD BY AUTOMATED COUNT: 13.4 % (ref 10–15)
ERYTHROCYTE [SEDIMENTATION RATE] IN BLOOD BY WESTERGREN METHOD: 67 MM/H (ref 0–30)
HCT VFR BLD AUTO: 29.3 % (ref 35–47)
HGB BLD-MCNC: 9.3 G/DL (ref 11.7–15.7)
MCH RBC QN AUTO: 34.6 PG (ref 26.5–33)
MCHC RBC AUTO-ENTMCNC: 31.7 G/DL (ref 31.5–36.5)
MCV RBC AUTO: 109 FL (ref 78–100)
PLATELET # BLD AUTO: 160 10E9/L (ref 150–450)
RBC # BLD AUTO: 2.69 10E12/L (ref 3.8–5.2)
WBC # BLD AUTO: 11.5 10E9/L (ref 4–11)

## 2020-10-08 PROCEDURE — 82784 ASSAY IGA/IGD/IGG/IGM EACH: CPT | Performed by: INTERNAL MEDICINE

## 2020-10-08 PROCEDURE — 86235 NUCLEAR ANTIGEN ANTIBODY: CPT | Performed by: INTERNAL MEDICINE

## 2020-10-08 PROCEDURE — 86039 ANTINUCLEAR ANTIBODIES (ANA): CPT | Performed by: INTERNAL MEDICINE

## 2020-10-08 PROCEDURE — 86255 FLUORESCENT ANTIBODY SCREEN: CPT | Performed by: INTERNAL MEDICINE

## 2020-10-08 PROCEDURE — 86606 ASPERGILLUS ANTIBODY: CPT | Mod: 90 | Performed by: INTERNAL MEDICINE

## 2020-10-08 PROCEDURE — 86431 RHEUMATOID FACTOR QUANT: CPT | Performed by: INTERNAL MEDICINE

## 2020-10-08 PROCEDURE — 99000 SPECIMEN HANDLING OFFICE-LAB: CPT

## 2020-10-08 PROCEDURE — 85652 RBC SED RATE AUTOMATED: CPT | Performed by: INTERNAL MEDICINE

## 2020-10-08 PROCEDURE — 83883 ASSAY NEPHELOMETRY NOT SPEC: CPT | Performed by: INTERNAL MEDICINE

## 2020-10-08 PROCEDURE — 84460 ALANINE AMINO (ALT) (SGPT): CPT

## 2020-10-08 PROCEDURE — 85027 COMPLETE CBC AUTOMATED: CPT | Performed by: INTERNAL MEDICINE

## 2020-10-08 PROCEDURE — 86331 IMMUNODIFFUSION OUCHTERLONY: CPT | Mod: 90 | Performed by: INTERNAL MEDICINE

## 2020-10-08 PROCEDURE — 82550 ASSAY OF CK (CPK): CPT | Mod: 90 | Performed by: INTERNAL MEDICINE

## 2020-10-08 PROCEDURE — 82164 ANGIOTENSIN I ENZYME TEST: CPT | Mod: 90

## 2020-10-08 PROCEDURE — 86038 ANTINUCLEAR ANTIBODIES: CPT | Performed by: INTERNAL MEDICINE

## 2020-10-08 PROCEDURE — 82552 ASSAY OF CPK IN BLOOD: CPT | Mod: 90 | Performed by: INTERNAL MEDICINE

## 2020-10-08 PROCEDURE — 86225 DNA ANTIBODY NATIVE: CPT | Performed by: INTERNAL MEDICINE

## 2020-10-08 PROCEDURE — 82247 BILIRUBIN TOTAL: CPT | Performed by: INTERNAL MEDICINE

## 2020-10-08 PROCEDURE — 84450 TRANSFERASE (AST) (SGOT): CPT | Performed by: INTERNAL MEDICINE

## 2020-10-08 PROCEDURE — 83883 ASSAY NEPHELOMETRY NOT SPEC: CPT | Mod: 59 | Performed by: INTERNAL MEDICINE

## 2020-10-08 PROCEDURE — 80048 BASIC METABOLIC PNL TOTAL CA: CPT | Performed by: INTERNAL MEDICINE

## 2020-10-08 PROCEDURE — 36415 COLL VENOUS BLD VENIPUNCTURE: CPT | Performed by: INTERNAL MEDICINE

## 2020-10-08 PROCEDURE — 99N1036 PR STATISTIC TOTAL PROTEIN: Performed by: INTERNAL MEDICINE

## 2020-10-09 LAB
ALT SERPL W P-5'-P-CCNC: 11 U/L (ref 0–50)
ANA PAT SER IF-IMP: ABNORMAL
ANA SER QL IF: POSITIVE
ANA TITR SER IF: ABNORMAL {TITER}
ANCA AB PATTERN SER IF-IMP: NORMAL
ANION GAP SERPL CALCULATED.3IONS-SCNC: 9 MMOL/L (ref 3–14)
AST SERPL W P-5'-P-CCNC: 11 U/L (ref 0–45)
BILIRUB SERPL-MCNC: 0.4 MG/DL (ref 0.2–1.3)
BUN SERPL-MCNC: 19 MG/DL (ref 7–30)
C-ANCA TITR SER IF: NORMAL {TITER}
CALCIUM SERPL-MCNC: 8.9 MG/DL (ref 8.5–10.1)
CHLORIDE SERPL-SCNC: 108 MMOL/L (ref 94–109)
CO2 SERPL-SCNC: 21 MMOL/L (ref 20–32)
CREAT SERPL-MCNC: 1.2 MG/DL (ref 0.52–1.04)
DSDNA AB SER-ACNC: 1 IU/ML
ENA JO1 IGG SER-ACNC: <0.2 AI (ref 0–0.9)
ENA RNP IGG SER IA-ACNC: 1.9 AI (ref 0–0.9)
ENA SCL70 IGG SER IA-ACNC: <0.2 AI (ref 0–0.9)
GFR SERPL CREATININE-BSD FRML MDRD: 43 ML/MIN/{1.73_M2}
GLUCOSE SERPL-MCNC: 91 MG/DL (ref 70–99)
IGA SERPL-MCNC: 326 MG/DL (ref 84–499)
IGG SERPL-MCNC: 1529 MG/DL (ref 610–1616)
IGM SERPL-MCNC: 238 MG/DL (ref 35–242)
KAPPA LC UR-MCNC: 7.75 MG/DL (ref 0.33–1.94)
KAPPA LC/LAMBDA SER: 1.31 {RATIO} (ref 0.26–1.65)
LAMBDA LC SERPL-MCNC: 5.91 MG/DL (ref 0.57–2.63)
POTASSIUM SERPL-SCNC: 4.4 MMOL/L (ref 3.4–5.3)
RHEUMATOID FACT SER NEPH-ACNC: 32 IU/ML (ref 0–20)
SODIUM SERPL-SCNC: 138 MMOL/L (ref 133–144)

## 2020-10-10 LAB — ACE SERPL-CCNC: <5 U/L (ref 9–67)

## 2020-10-11 LAB
CK BB CFR SERPL ELPH: 0 % (ref 0–0)
CK MACRO1 CFR SERPL: 0 % (ref 0–0)
CK MACRO2 CFR SERPL: 0 % (ref 0–0)
CK MB CFR SERPL ELPH: 0 % (ref 0–4)
CK MM CFR SERPL ELPH: 100 % (ref 96–100)
CK SERPL-CCNC: 23 U/L (ref 20–180)

## 2020-10-12 LAB
ALBUMIN SERPL ELPH-MCNC: 3.5 G/DL (ref 3.7–5.1)
ALPHA1 GLOB SERPL ELPH-MCNC: 0.3 G/DL (ref 0.2–0.4)
ALPHA2 GLOB SERPL ELPH-MCNC: 0.8 G/DL (ref 0.5–0.9)
B-GLOBULIN SERPL ELPH-MCNC: 0.9 G/DL (ref 0.6–1)
GAMMA GLOB SERPL ELPH-MCNC: 1.5 G/DL (ref 0.7–1.6)
M PROTEIN SERPL ELPH-MCNC: 0.3 G/DL
PROT PATTERN SERPL ELPH-IMP: ABNORMAL

## 2020-10-13 LAB
A FUMIGATUS1 AB SER QL ID: NORMAL
A FUMIGATUS6 AB SER QL ID: NORMAL
A PULLULANS AB SER QL ID: NORMAL
PIGEON DROP IGE QN: NORMAL
S RECTIVIRGULA AB SER QL ID: NORMAL
T VULGARIS AB SER QL ID: NORMAL

## 2020-10-23 DIAGNOSIS — E78.5 HYPERLIPIDEMIA LDL GOAL <100: ICD-10-CM

## 2020-10-23 RX ORDER — PRAVASTATIN SODIUM 40 MG
TABLET ORAL
Qty: 90 TABLET | Refills: 0 | OUTPATIENT
Start: 2020-10-23

## 2020-10-23 NOTE — TELEPHONE ENCOUNTER
"Requested Prescriptions   Pending Prescriptions Disp Refills     pravastatin (PRAVACHOL) 40 MG tablet [Pharmacy Med Name: PRAVASTATIN 40MG TABLETS] 90 tablet 3     Sig: TAKE 1 TABLET BY MOUTH DAILY AT BEDTIME       Statins Protocol Passed - 10/23/2020  5:15 AM        Passed - LDL on file in past 12 months     Recent Labs   Lab Test 01/10/20  1529   LDL 64             Passed - No abnormal creatine kinase in past 12 months     Recent Labs   Lab Test 10/08/20  1525   CKT 23                Passed - Recent (12 mo) or future (30 days) visit within the authorizing provider's specialty     Patient has had an office visit with the authorizing provider or a provider within the authorizing providers department within the previous 12 mos or has a future within next 30 days. See \"Patient Info\" tab in inbasket, or \"Choose Columns\" in Meds & Orders section of the refill encounter.              Passed - Medication is active on med list        Passed - Patient is age 18 or older        Passed - No active pregnancy on record        Passed - No positive pregnancy test in past 12 months             "

## 2020-11-10 ENCOUNTER — TRANSFERRED RECORDS (OUTPATIENT)
Dept: HEALTH INFORMATION MANAGEMENT | Facility: CLINIC | Age: 80
End: 2020-11-10

## 2020-11-10 DIAGNOSIS — E03.9 HYPOTHYROIDISM, UNSPECIFIED TYPE: ICD-10-CM

## 2020-11-11 RX ORDER — LEVOTHYROXINE SODIUM 100 UG/1
TABLET ORAL
Qty: 30 TABLET | Refills: 8 | Status: SHIPPED | OUTPATIENT
Start: 2020-11-11 | End: 2021-01-01

## 2020-11-30 ENCOUNTER — TELEPHONE (OUTPATIENT)
Dept: INTERNAL MEDICINE | Facility: CLINIC | Age: 80
End: 2020-11-30

## 2020-11-30 DIAGNOSIS — E78.5 HYPERLIPIDEMIA LDL GOAL <100: ICD-10-CM

## 2020-11-30 RX ORDER — PRAVASTATIN SODIUM 40 MG
TABLET ORAL
Qty: 90 TABLET | Refills: 2 | Status: SHIPPED | OUTPATIENT
Start: 2020-11-30 | End: 2021-01-01

## 2020-11-30 NOTE — TELEPHONE ENCOUNTER
Due for fasting lipids  Lab again in  June 2021 as ordered. RF done. Inform pt and then close encounter

## 2020-11-30 NOTE — TELEPHONE ENCOUNTER
Reason for Call:  Medication or medication refill:    Do you use a [facility]?  Name of the pharmacy and phone number for the current request:  [facility] - [phone] Fax [phone]    Name of the medication requested: Pravastatin 40 mg     Other request: Was told by the pharmacist that she needs a new prescription.     Can we leave a detailed message on this number? YES    Phone number patient can be reached at: Home number on file [phone] (home)    Best Time: any     Call taken on 11/30/2020 at 4:48 PM by Malika Patricia

## 2020-12-09 DIAGNOSIS — N18.30 TYPE 2 DIABETES MELLITUS WITH STAGE 3 CHRONIC KIDNEY DISEASE, WITHOUT LONG-TERM CURRENT USE OF INSULIN (H): ICD-10-CM

## 2020-12-09 DIAGNOSIS — E03.9 HYPOTHYROIDISM, UNSPECIFIED TYPE: ICD-10-CM

## 2020-12-09 DIAGNOSIS — E11.22 TYPE 2 DIABETES MELLITUS WITH STAGE 3 CHRONIC KIDNEY DISEASE, WITHOUT LONG-TERM CURRENT USE OF INSULIN (H): ICD-10-CM

## 2020-12-09 DIAGNOSIS — E78.5 HYPERLIPIDEMIA LDL GOAL <100: ICD-10-CM

## 2020-12-09 DIAGNOSIS — N18.30 CKD (CHRONIC KIDNEY DISEASE) STAGE 3, GFR 30-59 ML/MIN (H): ICD-10-CM

## 2020-12-09 LAB
ALBUMIN SERPL-MCNC: 3.2 G/DL (ref 3.4–5)
ALP SERPL-CCNC: 43 U/L (ref 40–150)
ALT SERPL W P-5'-P-CCNC: 12 U/L (ref 0–50)
ANION GAP SERPL CALCULATED.3IONS-SCNC: 5 MMOL/L (ref 3–14)
AST SERPL W P-5'-P-CCNC: 11 U/L (ref 0–45)
BILIRUB SERPL-MCNC: 0.5 MG/DL (ref 0.2–1.3)
BUN SERPL-MCNC: 23 MG/DL (ref 7–30)
CALCIUM SERPL-MCNC: 9 MG/DL (ref 8.5–10.1)
CHLORIDE SERPL-SCNC: 110 MMOL/L (ref 94–109)
CHOLEST SERPL-MCNC: 147 MG/DL
CO2 SERPL-SCNC: 27 MMOL/L (ref 20–32)
CREAT SERPL-MCNC: 1.28 MG/DL (ref 0.52–1.04)
CREAT UR-MCNC: 159 MG/DL
GFR SERPL CREATININE-BSD FRML MDRD: 39 ML/MIN/{1.73_M2}
GLUCOSE SERPL-MCNC: 94 MG/DL (ref 70–99)
HBA1C MFR BLD: 5.7 % (ref 0–5.6)
HDLC SERPL-MCNC: 54 MG/DL
LDLC SERPL CALC-MCNC: 77 MG/DL
MICROALBUMIN UR-MCNC: 33 MG/L
MICROALBUMIN/CREAT UR: 20.75 MG/G CR (ref 0–25)
NONHDLC SERPL-MCNC: 93 MG/DL
POTASSIUM SERPL-SCNC: 4.8 MMOL/L (ref 3.4–5.3)
PROT SERPL-MCNC: 7.6 G/DL (ref 6.8–8.8)
SODIUM SERPL-SCNC: 142 MMOL/L (ref 133–144)
TRIGL SERPL-MCNC: 80 MG/DL
TSH SERPL DL<=0.005 MIU/L-ACNC: 3.72 MU/L (ref 0.4–4)

## 2020-12-09 PROCEDURE — 82043 UR ALBUMIN QUANTITATIVE: CPT | Performed by: INTERNAL MEDICINE

## 2020-12-09 PROCEDURE — 84443 ASSAY THYROID STIM HORMONE: CPT | Performed by: INTERNAL MEDICINE

## 2020-12-09 PROCEDURE — 83036 HEMOGLOBIN GLYCOSYLATED A1C: CPT | Performed by: INTERNAL MEDICINE

## 2020-12-09 PROCEDURE — 80061 LIPID PANEL: CPT | Performed by: INTERNAL MEDICINE

## 2020-12-09 PROCEDURE — 36415 COLL VENOUS BLD VENIPUNCTURE: CPT | Performed by: INTERNAL MEDICINE

## 2020-12-09 PROCEDURE — 80053 COMPREHEN METABOLIC PANEL: CPT | Performed by: INTERNAL MEDICINE

## 2021-01-01 ENCOUNTER — MEDICAL CORRESPONDENCE (OUTPATIENT)
Dept: HEALTH INFORMATION MANAGEMENT | Facility: CLINIC | Age: 81
End: 2021-01-01

## 2021-01-01 ENCOUNTER — TRANSFERRED RECORDS (OUTPATIENT)
Dept: HEALTH INFORMATION MANAGEMENT | Facility: CLINIC | Age: 81
End: 2021-01-01

## 2021-01-01 ENCOUNTER — TELEPHONE (OUTPATIENT)
Dept: INTERNAL MEDICINE | Facility: CLINIC | Age: 81
End: 2021-01-01

## 2021-01-01 ENCOUNTER — DOCUMENTATION ONLY (OUTPATIENT)
Dept: INTERNAL MEDICINE | Facility: CLINIC | Age: 81
End: 2021-01-01

## 2021-01-01 ENCOUNTER — IMMUNIZATION (OUTPATIENT)
Dept: NURSING | Facility: CLINIC | Age: 81
End: 2021-01-01
Attending: INTERNAL MEDICINE
Payer: COMMERCIAL

## 2021-01-01 ENCOUNTER — IMMUNIZATION (OUTPATIENT)
Dept: NURSING | Facility: CLINIC | Age: 81
End: 2021-01-01
Payer: COMMERCIAL

## 2021-01-01 ENCOUNTER — OFFICE VISIT (OUTPATIENT)
Dept: INTERNAL MEDICINE | Facility: CLINIC | Age: 81
End: 2021-01-01
Payer: COMMERCIAL

## 2021-01-01 VITALS
HEART RATE: 61 BPM | TEMPERATURE: 97.1 F | DIASTOLIC BLOOD PRESSURE: 68 MMHG | WEIGHT: 207 LBS | OXYGEN SATURATION: 100 % | SYSTOLIC BLOOD PRESSURE: 132 MMHG | BODY MASS INDEX: 34.45 KG/M2 | RESPIRATION RATE: 16 BRPM

## 2021-01-01 VITALS
TEMPERATURE: 97.7 F | BODY MASS INDEX: 34.78 KG/M2 | WEIGHT: 209 LBS | RESPIRATION RATE: 16 BRPM | HEART RATE: 66 BPM | OXYGEN SATURATION: 93 % | SYSTOLIC BLOOD PRESSURE: 134 MMHG | DIASTOLIC BLOOD PRESSURE: 76 MMHG

## 2021-01-01 DIAGNOSIS — J84.112 IPF (IDIOPATHIC PULMONARY FIBROSIS) (H): ICD-10-CM

## 2021-01-01 DIAGNOSIS — I47.10 SVT (SUPRAVENTRICULAR TACHYCARDIA) (H): ICD-10-CM

## 2021-01-01 DIAGNOSIS — E03.9 HYPOTHYROIDISM, UNSPECIFIED TYPE: ICD-10-CM

## 2021-01-01 DIAGNOSIS — N18.32 STAGE 3B CHRONIC KIDNEY DISEASE (H): ICD-10-CM

## 2021-01-01 DIAGNOSIS — K21.9 GASTROESOPHAGEAL REFLUX DISEASE: ICD-10-CM

## 2021-01-01 DIAGNOSIS — F41.9 ANXIETY: ICD-10-CM

## 2021-01-01 DIAGNOSIS — J84.112 IPF (IDIOPATHIC PULMONARY FIBROSIS) (H): Primary | ICD-10-CM

## 2021-01-01 DIAGNOSIS — Z53.9 DIAGNOSIS NOT YET DEFINED: Primary | ICD-10-CM

## 2021-01-01 DIAGNOSIS — J84.112 FIBROSIS, IDIOPATHIC PULMONARY (H): Primary | ICD-10-CM

## 2021-01-01 DIAGNOSIS — J84.112 IDIOPATHIC PULMONARY FIBROSIS (H): ICD-10-CM

## 2021-01-01 DIAGNOSIS — I10 ESSENTIAL HYPERTENSION, BENIGN: ICD-10-CM

## 2021-01-01 DIAGNOSIS — E78.5 HYPERLIPIDEMIA LDL GOAL <100: ICD-10-CM

## 2021-01-01 DIAGNOSIS — E55.9 VITAMIN D DEFICIENCY: ICD-10-CM

## 2021-01-01 DIAGNOSIS — R06.02 SOB (SHORTNESS OF BREATH): Primary | ICD-10-CM

## 2021-01-01 DIAGNOSIS — J84.112 IDIOPATHIC PULMONARY FIBROSIS (H): Primary | ICD-10-CM

## 2021-01-01 DIAGNOSIS — D64.9 ANEMIA, UNSPECIFIED TYPE: ICD-10-CM

## 2021-01-01 LAB
ALT SERPL W P-5'-P-CCNC: 20 U/L (ref 0–50)
ALT SERPL W P-5'-P-CCNC: 21 U/L (ref 0–50)
ALT SERPL W P-5'-P-CCNC: 38 U/L (ref 0–50)
ANION GAP SERPL CALCULATED.3IONS-SCNC: 3 MMOL/L (ref 3–14)
AST SERPL W P-5'-P-CCNC: 19 U/L (ref 0–45)
AST SERPL W P-5'-P-CCNC: 20 U/L (ref 0–45)
AST SERPL W P-5'-P-CCNC: 22 U/L (ref 0–45)
BILIRUB SERPL-MCNC: 0.4 MG/DL (ref 0.2–1.3)
BILIRUB SERPL-MCNC: 0.4 MG/DL (ref 0.2–1.3)
BILIRUB SERPL-MCNC: 0.5 MG/DL (ref 0.2–1.3)
BUN SERPL-MCNC: 17 MG/DL (ref 7–30)
CALCIUM SERPL-MCNC: 8.9 MG/DL (ref 8.5–10.1)
CHLORIDE SERPL-SCNC: 105 MMOL/L (ref 94–109)
CO2 SERPL-SCNC: 31 MMOL/L (ref 20–32)
CREAT SERPL-MCNC: 1.24 MG/DL (ref 0.52–1.04)
ERYTHROCYTE [DISTWIDTH] IN BLOOD BY AUTOMATED COUNT: 13.2 % (ref 10–15)
GFR SERPL CREATININE-BSD FRML MDRD: 41 ML/MIN/{1.73_M2}
GLUCOSE SERPL-MCNC: 134 MG/DL (ref 70–99)
HCT VFR BLD AUTO: 29.2 % (ref 35–47)
HGB BLD-MCNC: 9 G/DL (ref 11.7–15.7)
MCH RBC QN AUTO: 34.9 PG (ref 26.5–33)
MCHC RBC AUTO-ENTMCNC: 30.8 G/DL (ref 31.5–36.5)
MCV RBC AUTO: 113 FL (ref 78–100)
PLATELET # BLD AUTO: 138 10E9/L (ref 150–450)
POTASSIUM SERPL-SCNC: 4.2 MMOL/L (ref 3.4–5.3)
RBC # BLD AUTO: 2.58 10E12/L (ref 3.8–5.2)
RETINOPATHY: NEGATIVE
SODIUM SERPL-SCNC: 139 MMOL/L (ref 133–144)
WBC # BLD AUTO: 12 10E9/L (ref 4–11)

## 2021-01-01 PROCEDURE — 82247 BILIRUBIN TOTAL: CPT | Performed by: NURSE PRACTITIONER

## 2021-01-01 PROCEDURE — 0012A PR COVID VAC MODERNA 100 MCG/0.5 ML IM: CPT

## 2021-01-01 PROCEDURE — 36415 COLL VENOUS BLD VENIPUNCTURE: CPT | Performed by: NURSE PRACTITIONER

## 2021-01-01 PROCEDURE — 84450 TRANSFERASE (AST) (SGOT): CPT | Performed by: NURSE PRACTITIONER

## 2021-01-01 PROCEDURE — 36415 COLL VENOUS BLD VENIPUNCTURE: CPT | Performed by: INTERNAL MEDICINE

## 2021-01-01 PROCEDURE — 80048 BASIC METABOLIC PNL TOTAL CA: CPT | Performed by: INTERNAL MEDICINE

## 2021-01-01 PROCEDURE — 82247 BILIRUBIN TOTAL: CPT | Performed by: INTERNAL MEDICINE

## 2021-01-01 PROCEDURE — 84460 ALANINE AMINO (ALT) (SGPT): CPT | Performed by: NURSE PRACTITIONER

## 2021-01-01 PROCEDURE — 85027 COMPLETE CBC AUTOMATED: CPT | Performed by: INTERNAL MEDICINE

## 2021-01-01 PROCEDURE — G0180 MD CERTIFICATION HHA PATIENT: HCPCS | Performed by: INTERNAL MEDICINE

## 2021-01-01 PROCEDURE — 84460 ALANINE AMINO (ALT) (SGPT): CPT | Performed by: INTERNAL MEDICINE

## 2021-01-01 PROCEDURE — 91301 PR COVID VAC MODERNA 100 MCG/0.5 ML IM: CPT

## 2021-01-01 PROCEDURE — 99213 OFFICE O/P EST LOW 20 MIN: CPT | Performed by: INTERNAL MEDICINE

## 2021-01-01 PROCEDURE — 0011A PR COVID VAC MODERNA 100 MCG/0.5 ML IM: CPT

## 2021-01-01 PROCEDURE — 84450 TRANSFERASE (AST) (SGOT): CPT | Performed by: INTERNAL MEDICINE

## 2021-01-01 RX ORDER — CITALOPRAM HYDROBROMIDE 20 MG/1
TABLET ORAL
Qty: 145 TABLET | Refills: 0 | Status: SHIPPED | OUTPATIENT
Start: 2021-01-01 | End: 2021-01-01

## 2021-01-01 RX ORDER — METOPROLOL SUCCINATE 25 MG/1
TABLET, EXTENDED RELEASE ORAL
Qty: 90 TABLET | Refills: 0 | Status: SHIPPED | OUTPATIENT
Start: 2021-01-01 | End: 2021-01-01

## 2021-01-01 RX ORDER — METOPROLOL SUCCINATE 25 MG/1
TABLET, EXTENDED RELEASE ORAL
Qty: 90 TABLET | Refills: 2 | Status: SHIPPED | OUTPATIENT
Start: 2021-01-01

## 2021-01-01 RX ORDER — CHOLECALCIFEROL (VITAMIN D3) 50 MCG
TABLET ORAL
Qty: 100 TABLET | Refills: 2 | Status: SHIPPED | OUTPATIENT
Start: 2021-01-01

## 2021-01-01 RX ORDER — PRAVASTATIN SODIUM 40 MG
TABLET ORAL
Qty: 90 TABLET | Refills: 2 | Status: SHIPPED | OUTPATIENT
Start: 2021-01-01

## 2021-01-01 RX ORDER — LEVOTHYROXINE SODIUM 100 UG/1
TABLET ORAL
Qty: 30 TABLET | Refills: 5 | Status: CANCELLED | OUTPATIENT
Start: 2021-01-01

## 2021-01-01 RX ORDER — ALBUTEROL SULFATE 90 UG/1
AEROSOL, METERED RESPIRATORY (INHALATION)
Qty: 18 G | Refills: 11 | Status: SHIPPED | OUTPATIENT
Start: 2021-01-01

## 2021-01-01 RX ORDER — LISINOPRIL 40 MG/1
TABLET ORAL
Qty: 90 TABLET | Refills: 0 | Status: SHIPPED | OUTPATIENT
Start: 2021-01-01 | End: 2021-01-01

## 2021-01-01 RX ORDER — CITALOPRAM HYDROBROMIDE 20 MG/1
TABLET ORAL
Qty: 145 TABLET | Refills: 2 | Status: SHIPPED | OUTPATIENT
Start: 2021-01-01

## 2021-01-01 RX ORDER — LEVOTHYROXINE SODIUM 100 UG/1
TABLET ORAL
Qty: 30 TABLET | Refills: 5 | OUTPATIENT
Start: 2021-01-01

## 2021-01-01 RX ORDER — LEVOTHYROXINE SODIUM 100 UG/1
TABLET ORAL
Qty: 30 TABLET | Refills: 5 | Status: SHIPPED | OUTPATIENT
Start: 2021-01-01 | End: 2021-01-01

## 2021-01-01 RX ORDER — LEVOTHYROXINE SODIUM 100 UG/1
TABLET ORAL
Qty: 90 TABLET | Refills: 1 | Status: SHIPPED | OUTPATIENT
Start: 2021-01-01

## 2021-01-01 RX ORDER — LISINOPRIL 40 MG/1
TABLET ORAL
Qty: 90 TABLET | Refills: 3 | Status: SHIPPED | OUTPATIENT
Start: 2021-01-01

## 2021-01-01 ASSESSMENT — PATIENT HEALTH QUESTIONNAIRE - PHQ9: SUM OF ALL RESPONSES TO PHQ QUESTIONS 1-9: 4

## 2021-01-16 NOTE — TELEPHONE ENCOUNTER
Spoke with pt. Reviewed recent BMP. GFR   remains < 40 so will do CT chest without contrast to assess left lung consolidation.  Order placed   Discussed that Advair not helping.  However after discussing with patient, she states that the number on the outside of the Advair desk had not been changing so it appears patient has not been correctly using this.  While on the phone, patient's grandson Eloise also came on the phone who has been helping patient often.  Gave him radiology scheduling #[phone] to call consulted tomorrow to schedule patient's CT.  Talked through technique for opening and loading the Advair disc.  He tried this himself loading a dose and the number changed on the Advair and work properly.  At him shake out the micronized powder dose so as not to double up a future dosage.  He will be there tomorrow morning and will assist patient with showing her how to use the Advair properly.  If not helping over the next week, patient may then fill prescription sent to pharmacy for albuterol nebs every 6 hours as needed shortness of breath  
"Dr. Lilly,     Pt calling, notified her that her BMP results have not changed from when they were last done 1 month ago.       See TE from 8/20:  \"Needs CT of the chest to define the lungs further.  Last GFR from 1 month ago was 38.  Needs GFR of 40 or more to use contrast per CT tech.  Patient will have repeat nonfasting BMP done in the next few days to recheck kidney function.  Based on that result, will then order CT of the chest either with or without contrast.\"    -- Since GFR was still 38, do you want to order the chest CT scan w/o contrast?    Also, pt says that the Advair inhaler is not helping her breathing symptoms. She is requesting a refill of her alb nebulizer. Says the neb was very effective for her symptoms. She does have the alb inhaler - says this helps a little, but not like the neb.     -- Pharmacy & RX pending.  "
Reason for Call:  Request for results:    Name of test or procedure: lab test(s)    Date of test of procedure: 08.21.2020    Location of the test or procedure: [facility], [place], [place]    OK to leave the result message on voice mail or with a family member? YES    Phone number Patient can be reached at:  Other phone number:  [phone]    Additional comments: the daughter wants to know the lab results asap, so she knows why type./version of CT scan needs to be scheduled.  Also, she doesn't want the recent Inhaler filled, she wants to go back to the previous one she has been on in the past, it works better.  The daughter did not have the Inhaler(s) in front of her to tell me the names of them..........      Call taken on 8/25/2020 at 8:24 AM by Pamela Watson  
ecoriated rash, diffusely consistent with scabies rash./RAISED/REDDENED

## 2021-02-15 NOTE — PATIENT INSTRUCTIONS
Wrist x-ray today  Schedule [facility] radiology appointment in 2 weeks to repeat chest x-ray  Fluticasone-salmeterol (ADVAIR DISKUS) 100-50 MCG/DOSE inhaler; Inhale 1 puff into the lungs every 12 hours   Await results of EMG study from neurology  Reduce calorie/carbohydrate (sugar, bread, potato, pasta, rice, alcohol etc)  intake in diet and increase color on your plate with fruits and vegetables for weight loss   Referral to ortho re: wrist pain and degenerative/arthritis changes. Possible cortisone injection trial. Ortho schedulers will call you to schedule toe appointment

## 2021-02-19 NOTE — TELEPHONE ENCOUNTER
Patient calling,      We are working hard to begin vaccinating more people against COVID-19. Currently, we are only vaccinating individuals age 75 and older and Phase 1a workers - healthcare workers who are unable to do their job remotely. Vaccine availability is very limited.    If you are 75 or older, or a healthcare worker who is unable to do your job remotely, please log in to [employer] using this link to see if we have an open appointment and schedule an appointment.  If there are no appointments left, you will be unable to schedule and need to check back later.  If you are a healthcare worker, you will be asked to provide proof of employment at your appointment. If you cannot, you will be turned away.    Vaccine appointments are being added as they become available. Please check your [employer] account frequently for availability. If you have technical difficulty using [employer], call [phone] for assistance.    You can learn more about the [facility]'s phased approach to administering the vaccine, with details on each phase, [email or web address].      As vaccine supply increases and we are able to open appointments to more groups, we will share that information on our website [email or web address]. Check this website to stay up to date on COVID-19 vaccination information.    Elicia PEREZN, RN, PHN

## 2021-03-09 NOTE — PROGRESS NOTES
Patient is due for follow-up appointment with me to review her pulmonary status (now on oxygen therapy and also being followed by lung clinic for pulmonary fibrosis), recheck of blood pressure status and overall medical status.  Instead of patient having a lab appointment only, I would instead suggest the current  lab appointment be canceled and patient see me back in clinic sometime that same  Week (the week of 3/22/21)  instead and I can have labs drawn as necessary in the lab after I see the patient so she is only making 1 trip to the clinic.  Please call patient and assist in scheduling appointment with me and then canceling scheduled lab appointment

## 2021-03-11 NOTE — PROGRESS NOTES
Lab Appointment Cx'd=Left message for patient to schedule follow up with Dr Lilly for the week of 22nd

## 2021-03-12 NOTE — PROGRESS NOTES
Patient called back upset that her lab appointment was canceled for the 23rd.. Patient states She is seeing a lung Doctor and the lab appointment is for Dr. Kennedy.

## 2021-03-22 NOTE — LETTER
[facility]  [address] [place] [address]  [phone]      3/26/2021       Mahogany Blanco  [address] [place] [address]        Dear Mahogany,  While refilling your prescription today, we noticed that you are due for a follow up appointment  with Dr. Lilly. We will refill your prescription for 90 days. Please call to schedule to a follow up appointment with Dr. Lilly with in the next month.    Taking care of your health is important to us and we look forward to seeing you in the near future.  Please call us at [phone] or [phone] (or use [employer]) to schedule an appointment.     Please disregard this notice if you have already made an appointment.        Sincerely,      Aquilino Lilly MD/Sheri Darling, [facility]  Internal Medicine

## 2021-03-25 NOTE — TELEPHONE ENCOUNTER
Pt last seen in July 2020. Due for f/u appt with me in clinic to review BP and other medical issues. Call pt and assist with scheduling  appt with me in the next month or so. RF done for 90 days. Hx chronic stable CKD

## 2021-06-21 NOTE — TELEPHONE ENCOUNTER
Patient Quality Outreach      Summary:    Patient has the following on her problem list/HM: None  Patient Active Problem List   Diagnosis     Sleep apnea     Essential hypertension, benign     Cataract     HYPERLIPIDEMIA LDL GOAL <100     CKD (chronic kidney disease) stage 3, GFR 30-59 ml/min     Counseling on health care directive     Health Care Home     Advanced directives, counseling/discussion     Vitamin D deficiency     Anxiety     Anemia     JENNIFER (iron deficiency anemia) - subclinical     Gout     Morbid obesity, unspecified obesity type (H)     Major depressive disorder, single episode, moderate (H)     Hypothyroidism, unspecified type     Gastroesophageal reflux disease, esophagitis presence not specified     Type 2 diabetes mellitus with stage 3 chronic kidney disease, without long-term current use of insulin (H)     Lung nodule     MGUS (monoclonal gammopathy of unknown significance)     Elevated antinuclear antibody (JACINTO) level     Thrombocytopenia (H)     B12 deficiency     Light chain disease (H)     Neuropathy     SOB (shortness of breath)       Patient is due/failing the following:   A1C, FIT, PHQ-9 Needed, Annual wellness, date due: 04/05/2005 and Immunizations    Type of outreach:    Sent letter.    Questions for provider review:    None                                                                                                                                     Sheri Darling CMA       Chart routed to Care Team.

## 2021-06-21 NOTE — LETTER
[facility]  [address] [address]  [phone]      6/21/2021       Mahogany Blanco  [address]  [place] [address]        Dear Mahogany,  Your healthcare team cares about your health. To provide you with the best care,   we have reviewed your chart and based on our findings, we see that you are due to:     Annual Wellness Follow up-Please contact number listed above to schedule or schedule via [employer].     If you have already completed these items, please contact the clinic via phone or   [employer] App[employer]art so your care team can review and update your records. Thank you for   choosing [facility] for your healthcare needs. For any questions,   concerns, or to schedule an appointment please contact the clinic.     Healthy Regards,    Your [facility] Care Team

## 2021-07-01 NOTE — PROGRESS NOTES
ASSESSMENT:   1. IPF (idiopathic pulmonary fibrosis) (H)  Progressive.  Patient needing increasing oxygen demands.  Patient is to start hospice care.  Patient DNR/DNI and do not hospitalize  - Hospice Referral; Future    2. Anemia, unspecified type  Will check hemoglobin status to assess anemia status.   - CBC with platelets  - Hospice Referral; Future    3. Stage 3b chronic kidney disease  Labs ordered to assess renal status to see if can be fine-tuned to improve patient's symptoms prior to hospice initiation with for the lab monitoring will be discontinued  - Basic metabolic panel  - Hospice Referral; Future      PLAN:  Labs as ordered  Continue current medication oxygen therapy  Will start hospice care.  Referral placed and central scheduling will contact you regarding appointment date for evaluation  MD portion of handicap parking permit completed.  Fill in data from the other upper 1/3 of form and bring to [employer] to obtain the permit  Contact the clinic if you have other needs      (Chart documentation was completed, in part, with [employer] voice-recognition software. Even though reviewed, some grammatical, spelling, and word errors may remain.)    Aquilino Lilly MD  Internal Medicine Department  [facility]      Clive Vides is a 81 year old who presents for the following health issues     HPI     Chief Complaint   Patient presents with     Follow Up     on Multiple Issues, Discuss Pallitive Hospice Care      Forms     for Handi Cap Cert     Most recent lab results reviewed with pt.      Patient now has shortness of breath both at rest and with minimal ambulation.  On 4 to 5 L of oxygen per minute.  Will get chest pressure occasionally also with exertion.  Patient now interested in starting hospice therapy.  Known idiopathic pulmonary fibrosis which has been progressive.  Patient followed by pulmonary clinic.  Needs handicap permit also when transported by family.  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  Denies any blood in stools    Additional ROS:   Constitutional, HEENT, Cardiovascular, Pulmonary, GI and , Neuro, MSK and Psych review of systems/symptoms are otherwise negative or unchanged from previous, except as noted above.      OBJECTIVE:  /68   Pulse 61   Temp 97.1  F (36.2  C) (Temporal)   Resp 16   Wt 93.9 kg (207 lb)   LMP 06/16/1985 (Approximate)   SpO2 100%   BMI 34.45 kg/m     Estimated body mass index is 34.45 kg/m  as calculated from the following:    Height as of 6/15/20: 1.651 m (5' 5\").    Weight as of this encounter: 93.9 kg (207 lb).     Neck: no adenopathy. Thyroid normal to palpation. No bruits  Pulm: No wheezes.  Chronic crackles in lungs consistent with pulmonary fibrosis.  Patient appears mildly dyspneic.  Able to speak short sentences.  No accessory muscle use at rest  CV: Regular rates and rhythm  GI: Soft, obese, nontender, Normal active bowel sounds, No hepatosplenomegaly or masses palpable  Ext: Peripheral pulses intact.  Mild chronic bilateral lower extremity edema.                   "

## 2021-07-15 PROBLEM — J84.112 IPF (IDIOPATHIC PULMONARY FIBROSIS) (H): Status: ACTIVE | Noted: 2021-01-01

## 2021-07-22 NOTE — TELEPHONE ENCOUNTER
Yes I will follow pt. I am out of the clinic the rest of the week but will sign any necessary forms when I return next Monday

## 2021-07-22 NOTE — TELEPHONE ENCOUNTER
Gilda Crocker - with [facility] called (c: [phone])     NEEDS CALL BACK TODAY     Ok to leave a detailed message     Admitting patient to Hospice tomorrow    Asking if Dr Lilly will continue to follow patient - signing standing orders/death certificate when time comes     Anu VIGIL RN

## 2021-07-26 NOTE — TELEPHONE ENCOUNTER
Reason for Call:  Form, our goal is to have forms completed with 72 hours, however, some forms may require a visit or additional information.    Type of letter, form or note:  FMLA    Who is the form from?: [employer] (if other please explain)    Where did the form come from: form was faxed in    What clinic location was the form placed at?: Internal Medicine    Where the form was placed: Pcp's Folder    What number is listed as a contact on the form?: [phone]       Additional comments: Form is for patients carmelita Brock [phone]    Call taken on 7/26/2021 at 2:26 PM by Katy Solano

## 2021-07-26 NOTE — TELEPHONE ENCOUNTER
Per TE 7/22/21, pt was admitted to Hospice on that day.  FMLA form request is from patient's daughter Raul.  Unclear how often patient's daughter is requesting to be absent from work per week or if seeking to be away from work completely for an extended period of time. Get more info form daughter and then route back to me

## 2021-07-28 NOTE — TELEPHONE ENCOUNTER
Spoke with daughter, Yessenia, she state that it will be on a as needed basis for the leave, because family members are taking turns helping with Patient.

## 2021-07-29 NOTE — TELEPHONE ENCOUNTER
Left message to call back re: more Information for FMLA forms-PLEASE TRANSFER CALL TO CHASTITY BOYKIN, [phone]

## 2021-07-29 NOTE — TELEPHONE ENCOUNTER
" FMLA form requires  much more detailed information re: expected  amount of absence of employee from work. \"As needed\"  Offers no information to me. Please get specific info re: days of week pt working now and hours she is working now and specifically  The numbers of days or hours per week pt expects to be   missing work as part of her plan taking  Her turn with other siblings caring for patient and then route that information back to me  "

## 2021-07-29 NOTE — TELEPHONE ENCOUNTER
Forms filled in Dates are from 07/26/2021 to 01/26/2022.   8 hours per day 3 times per week 12 times a month.

## 2021-08-02 NOTE — TELEPHONE ENCOUNTER
FMLA form faxed to [facility] at [phone]. Form sent to be scanned into chart. Yessenia montoya.

## 2021-08-02 NOTE — TELEPHONE ENCOUNTER
FMLA form completed and in [place]. Please fax form, update daughter Yessenia and then close encounter

## 2021-09-09 NOTE — TELEPHONE ENCOUNTER
Prescription approved per [facility] Refill Protocol.  Patrick Moeller RN  [facility] Triage Nurse

## 2022-05-16 NOTE — PATIENT INSTRUCTIONS
Labs as ordered  Continue current medication oxygen therapy  Will start hospice care.  Referral placed and central scheduling will contact you regarding appointment date for evaluation  MD portion of handicap parking permit completed.  Fill in data from the other upper 1/3 of form and bring to [employer] to obtain the permit  Contact the clinic if you have other needs

## 2023-11-10 NOTE — TELEPHONE ENCOUNTER
Outpatient nutrition:   Gamaliel left this RD a voicemail message, requesting call back.    Attempted to call back and left voicemail message saying this RD will call him back on Monday (11/13) in the afternoon.    Reason for Call:  Request for results:    Name of test or procedure: lab results    Date of test of procedure: 01/10/20    Location of the test or procedure: [facility]    OK to leave the result message on voice mail or with a family member? YES    Phone number Patient can be reached at:  Home number on file [phone] (home)    Additional comments: pt wants to know the results of her labs. Please call pt back with lab results. Thanks.    Call taken on 1/23/2020 at 11:16 AM by LORRIE LEY

## (undated) RX ORDER — REGADENOSON 0.08 MG/ML
INJECTION, SOLUTION INTRAVENOUS
Status: DISPENSED
Start: 2017-09-01